# Patient Record
Sex: FEMALE | Race: WHITE | NOT HISPANIC OR LATINO | Employment: FULL TIME | URBAN - METROPOLITAN AREA
[De-identification: names, ages, dates, MRNs, and addresses within clinical notes are randomized per-mention and may not be internally consistent; named-entity substitution may affect disease eponyms.]

---

## 2017-11-29 ENCOUNTER — OFFICE VISIT (OUTPATIENT)
Dept: OPTOMETRY | Facility: CLINIC | Age: 62
End: 2017-11-29
Payer: COMMERCIAL

## 2017-11-29 DIAGNOSIS — H43.811 POSTERIOR VITREOUS DETACHMENT OF RIGHT EYE: ICD-10-CM

## 2017-11-29 DIAGNOSIS — H52.4 ASTIGMATISM WITH PRESBYOPIA, BILATERAL: ICD-10-CM

## 2017-11-29 DIAGNOSIS — H52.203 ASTIGMATISM WITH PRESBYOPIA, BILATERAL: ICD-10-CM

## 2017-11-29 DIAGNOSIS — H25.13 NUCLEAR SCLEROSIS, BILATERAL: Primary | ICD-10-CM

## 2017-11-29 PROCEDURE — 99999 PR PBB SHADOW E&M-EST. PATIENT-LVL II: CPT | Mod: PBBFAC,,, | Performed by: OPTOMETRIST

## 2017-11-29 PROCEDURE — 92015 DETERMINE REFRACTIVE STATE: CPT | Mod: S$GLB,,, | Performed by: OPTOMETRIST

## 2017-11-29 PROCEDURE — 92014 COMPRE OPH EXAM EST PT 1/>: CPT | Mod: S$GLB,,, | Performed by: OPTOMETRIST

## 2017-11-29 NOTE — PROGRESS NOTES
HPI     Last eye exam was 11/1/16 with Dr. Sorenson.  Patient states for the past 2 weeks has been seeing flashes temporal OD   with new floaters. Overall vision seems the same.  Patient denies diplopia, headaches, and pain.      Last edited by Lorna Aranda on 11/29/2017  3:13 PM. (History)            Assessment /Plan     For exam results, see Encounter Report.    Nuclear sclerosis, bilateral    Posterior vitreous detachment of right eye    Astigmatism with presbyopia, bilateral            1.  Educated on cataracts and affects on vision.  Early-monitor.  2.  Educated pt on PVD and on signs/symptoms RD: increase floaters, flashes, black curtain.  Retina normal OU--no holes, tears, breaks, or RDs.  3.  Bifocal rx given          RTC 1 month for PVD check.

## 2018-01-12 ENCOUNTER — OFFICE VISIT (OUTPATIENT)
Dept: OPTOMETRY | Facility: CLINIC | Age: 63
End: 2018-01-12
Payer: COMMERCIAL

## 2018-01-12 DIAGNOSIS — H43.811 POSTERIOR VITREOUS DETACHMENT OF RIGHT EYE: Primary | ICD-10-CM

## 2018-01-12 PROCEDURE — 99999 PR PBB SHADOW E&M-EST. PATIENT-LVL II: CPT | Mod: PBBFAC,,, | Performed by: OPTOMETRIST

## 2018-01-12 PROCEDURE — 92014 COMPRE OPH EXAM EST PT 1/>: CPT | Mod: S$GLB,,, | Performed by: OPTOMETRIST

## 2018-01-12 NOTE — PROGRESS NOTES
HPI     Concerns About Ocular Health    Additional comments: PVD check OD           Comments   Patient's last dilated exam was: 11/29/2017    Pt here for 1 month PVD check OD. Pt states still seeing flashes and   floater OD, several times a day, no changes since last visit. No changes   in vision. Not using any gtts        Last edited by Jenni Maharaj, PCT on 1/12/2018  2:53 PM.   (History)            Assessment /Plan     For exam results, see Encounter Report.    Posterior vitreous detachment of right eye            1.  Still sees flashes 3-4x/day--no improvement over the last 6 weeks.  Floaters stable.  No holes, tears, breaks, or RDs found.  Will schedule appointment with retina for further examination since flashes are still persistent.

## 2018-02-06 ENCOUNTER — INITIAL CONSULT (OUTPATIENT)
Dept: OPHTHALMOLOGY | Facility: CLINIC | Age: 63
End: 2018-02-06
Payer: COMMERCIAL

## 2018-02-06 DIAGNOSIS — H43.811 POSTERIOR VITREOUS DETACHMENT, RIGHT: Primary | ICD-10-CM

## 2018-02-06 DIAGNOSIS — H25.13 NS (NUCLEAR SCLEROSIS), BILATERAL: ICD-10-CM

## 2018-02-06 PROCEDURE — 99999 PR PBB SHADOW E&M-EST. PATIENT-LVL III: CPT | Mod: PBBFAC,,, | Performed by: OPHTHALMOLOGY

## 2018-02-06 PROCEDURE — 92014 COMPRE OPH EXAM EST PT 1/>: CPT | Mod: S$GLB,,, | Performed by: OPHTHALMOLOGY

## 2018-02-06 PROCEDURE — 92225 PR SPECIAL EYE EXAM, INITIAL: CPT | Mod: RT,S$GLB,, | Performed by: OPHTHALMOLOGY

## 2018-02-06 NOTE — PROGRESS NOTES
HPI     Eye Problem    Additional comments: consult per Yas PVD            Comments   Since mid November she has been seeing flashes and floaters OD. She had seen Dr Sorenson who recommended retina consult if this continued. She hasn't had any changes in her vision       Last edited by Caren Cervantes on 2/6/2018  2:29 PM. (History)      A/P    1. PVD OD  Persistent photopsias  No retinal breaks or traction    RD precautions    2. Early NS OU    REtina PRN

## 2018-02-06 NOTE — LETTER
February 6, 2018      Shona Sorenson, OD  [address]  [facility] [address]           [facility]  [address]  [facility] [address]  Phone: [phone]  Fax: [phone]          Patient: Kristy Wallisch   MR Number: [id number]   YOB: 1955   Date of Visit: 2/6/2018       Dear Dr. Shona Sorenson:    Thank you for referring Kristy Wallisch to me for evaluation. Attached you will find relevant portions of my assessment and plan of care.    If you have questions, please do not hesitate to call me. I look forward to following Kristy Wallisch along with you.    Sincerely,    WILLIAM Polk MD    Enclosure  CC:  No Recipients    If you would like to receive this communication electronically, please contact [email or web address] or [phone] to request more information on [employer] access.    For providers and/or their staff who would like to refer a patient to [facility], please contact us through our one-stop-shop provider referral line, [facility], at [phone].    If you feel you have received this communication in error or would no longer like to receive these types of communications, please e-mail [email or web address]

## 2018-11-06 ENCOUNTER — OFFICE VISIT (OUTPATIENT)
Dept: FAMILY MEDICINE | Facility: CLINIC | Age: 63
End: 2018-11-06
Payer: COMMERCIAL

## 2018-11-06 VITALS
BODY MASS INDEX: 28.76 KG/M2 | WEIGHT: 168.44 LBS | DIASTOLIC BLOOD PRESSURE: 80 MMHG | TEMPERATURE: 98 F | SYSTOLIC BLOOD PRESSURE: 146 MMHG | HEIGHT: 64 IN | RESPIRATION RATE: 16 BRPM

## 2018-11-06 DIAGNOSIS — M54.32 LEFT SIDED SCIATICA: Primary | ICD-10-CM

## 2018-11-06 DIAGNOSIS — R03.0 ELEVATED BLOOD PRESSURE READING: ICD-10-CM

## 2018-11-06 PROCEDURE — 99203 OFFICE O/P NEW LOW 30 MIN: CPT | Mod: S$GLB,,, | Performed by: INTERNAL MEDICINE

## 2018-11-06 PROCEDURE — 3008F BODY MASS INDEX DOCD: CPT | Mod: CPTII,S$GLB,, | Performed by: INTERNAL MEDICINE

## 2018-11-06 PROCEDURE — 99999 PR PBB SHADOW E&M-EST. PATIENT-LVL IV: CPT | Mod: PBBFAC,,, | Performed by: INTERNAL MEDICINE

## 2018-11-06 NOTE — PROGRESS NOTES
"Subjective:        Patient ID: Kristy Wallisch is a 62 y.o. female.    Chief Complaint: Leg Pain (left)    HPI   Kristy Wallisch presents with c/o left leg pain that started 1 week ago.  Pain starts in the left buttock and radiates down the leg to the calf, "skips the knee".  The pain feels like "burning" or "cramping"; it's not constant.  It is worse with sitting for long periods, better with standing, unaffected by lying down flat.  Initially the pain was relieved with a heating paid and aspirin but now it's more constant.  Pt reports being on a flight 3 days before the pain started but otherwise no change in her usual activity.  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  No fall, trauma or strenuous activity.    Denies leg weakness, numbness, tingling.    Review of Systems  as per HPI      Objective:        Vitals:    11/06/18 1608   BP: (!) 146/80   Resp:    Temp:      Physical Exam   Constitutional: She is oriented to person, place, and time. She appears well-developed and well-nourished. No distress.   Musculoskeletal: Normal range of motion. She exhibits no tenderness or deformity.   - 5/5 strength in b/l LEs  - positive SLR b/l   Neurological: She is alert and oriented to person, place, and time.   Skin: Skin is warm and dry.   Vitals reviewed.          Assessment:         1. Left sided sciatica    2. Elevated blood pressure reading              Plan:         Shanae was seen today for leg pain.    Diagnoses and all orders for this visit:    Left sided sciatica: Discussed conservative treatment including NSAIDs, ice or heat, gentle stretching, avoid exacerbating activities including prolonged sitting.  Follow up if worse or not improving; consider PT, Gabapentin or steroids at that time.    Elevated blood pressure: Improved but still elevated when rechecked by me.  Pt denies personal hx of HTN but she admits to " infrequent doctors' visits.  Recommend pt establish care with PCP and follow up for annual exam and labs.          Follow-up in about 1 month (around 12/6/2018), or if symptoms worsen or fail to improve.    Patient Instructions   Avoid sitting for long periods of time, heavy lifting, strenuous activity until pain improves.    Take anti inflammatories for the first week or until pain improves then decrease to as needed:  - Ibuprofen 800mg twice daily  - Aleve 500mg twice daily  Take these with food to avoid stomach irritation.    Ice or heat as needed to areas of pain.    Gentle stretching of the low back and legs.      Sciatica    Sciatica is a condition that causes pain in the lower back that spreads down into the buttock, hip, and leg. Sometimes the leg pain can happen without any back pain. Sciatica happens when a spinal nerve is irritated or has pressure put on it as comes out of the spinal canal in the lower back. This most often happens when a bulge or rupture of a nearby spinal disk presses on the nerve. Sciatica can also be caused by a narrowing of the spinal canal (spinal stenosis) or spasm of the muscle in the buttocks that the sciatic nerve passes through (pyriform muscle). Sciatica is also called lumbar radiculopathy.  Sciatica may begin after a sudden twisting or bending force, such as in a car accident. Or it can happen after a simple awkward movement. In either case, muscle spasm often also happens. Muscle spasm makes the pain worse.  A healthcare provider makes a diagnosis of sciatica from your symptoms and a physical exam. Unless you had an injury from a car accident or fall, you usually wont have X-rays taken at this time. This is because the nerves and disks in your back cant be seen on an X-ray. If the provider sees signs of a compressed nerve, you will need to schedule an MRI scan as an outpatient. Signs of a compressed nerve include loss of strength in a leg.  Most sciatica gets better with  medicine, exercise, and physical therapy. If your symptoms continue after at least 3 months of medical treatment, you may need surgery or injections to your lower back.  Home care  Follow these tips when caring for yourself at home:  · You may need to stay in bed the first few days. But as soon as possible, begin sitting up or walking. This will help you avoid problems that come from staying in bed for long periods.  · When in bed, try to find a position that is comfortable. A firm mattress is best. Try lying flat on your back with pillows under your knees. You can also try lying on your side with your knees bent up toward your chest and a pillow between your knees.  · Avoid sitting for long periods. This puts more stress on your lower back than standing or walking.  · Use heat from a hot shower, hot bath, or heating pad to help ease pain. Massage can also help. You can also try using an ice pack. You can make your own ice pack by putting ice cubes in a plastic bag. Wrap the bag in a thin towel. Try both heat and cold to see which works best. Use the method that feels best for 20 minutes several times a day.  · You may use acetaminophen or ibuprofen to ease pain, unless another pain medicine was prescribed. Note: If you have chronic liver or kidney disease, talk with your healthcare provider before taking these medicines. Also talk with your provider if youve had a stomach ulcer or gastrointestinal bleeding.  · Use safe lifting methods. Dont lift anything heavier than 15 pounds until all of the pain is gone.  Follow-up care  Follow up with your healthcare provider, or as advised. You may need physical therapy or additional tests.  If X-rays were taken, a radiologist will look at them. You will be told of any new findings that may affect your care.  When to seek medical advice  Call your healthcare provider right away if any of these occur:  · Pain gets worse even after taking prescribed medicine  · Weakness or  numbness in 1 or both legs or hips  · Numbness in your groin or genital area  · You cant control your bowel or bladder  · Fever  · Redness or swelling over your back or spine   Date Last Reviewed: 8/1/2016 © 2000-2017 [employer]. [address] [place] [id number]. All rights reserved. This information is not intended as a substitute for professional medical care. Always follow your healthcare professional's instructions.

## 2018-11-06 NOTE — PATIENT INSTRUCTIONS
Avoid sitting for long periods of time, heavy lifting, strenuous activity until pain improves.    Take anti inflammatories for the first week or until pain improves then decrease to as needed:  - Ibuprofen 800mg twice daily  - Aleve 500mg twice daily  Take these with food to avoid stomach irritation.    Ice or heat as needed to areas of pain.    Gentle stretching of the low back and legs.      Sciatica    Sciatica is a condition that causes pain in the lower back that spreads down into the buttock, hip, and leg. Sometimes the leg pain can happen without any back pain. Sciatica happens when a spinal nerve is irritated or has pressure put on it as comes out of the spinal canal in the lower back. This most often happens when a bulge or rupture of a nearby spinal disk presses on the nerve. Sciatica can also be caused by a narrowing of the spinal canal (spinal stenosis) or spasm of the muscle in the buttocks that the sciatic nerve passes through (pyriform muscle). Sciatica is also called lumbar radiculopathy.  Sciatica may begin after a sudden twisting or bending force, such as in a car accident. Or it can happen after a simple awkward movement. In either case, muscle spasm often also happens. Muscle spasm makes the pain worse.  A healthcare provider makes a diagnosis of sciatica from your symptoms and a physical exam. Unless you had an injury from a car accident or fall, you usually wont have X-rays taken at this time. This is because the nerves and disks in your back cant be seen on an X-ray. If the provider sees signs of a compressed nerve, you will need to schedule an MRI scan as an outpatient. Signs of a compressed nerve include loss of strength in a leg.  Most sciatica gets better with medicine, exercise, and physical therapy. If your symptoms continue after at least 3 months of medical treatment, you may need surgery or injections to your lower back.  Home care  Follow these tips when caring for yourself at  home:  · You may need to stay in bed the first few days. But as soon as possible, begin sitting up or walking. This will help you avoid problems that come from staying in bed for long periods.  · When in bed, try to find a position that is comfortable. A firm mattress is best. Try lying flat on your back with pillows under your knees. You can also try lying on your side with your knees bent up toward your chest and a pillow between your knees.  · Avoid sitting for long periods. This puts more stress on your lower back than standing or walking.  · Use heat from a hot shower, hot bath, or heating pad to help ease pain. Massage can also help. You can also try using an ice pack. You can make your own ice pack by putting ice cubes in a plastic bag. Wrap the bag in a thin towel. Try both heat and cold to see which works best. Use the method that feels best for 20 minutes several times a day.  · You may use acetaminophen or ibuprofen to ease pain, unless another pain medicine was prescribed. Note: If you have chronic liver or kidney disease, talk with your healthcare provider before taking these medicines. Also talk with your provider if youve had a stomach ulcer or gastrointestinal bleeding.  · Use safe lifting methods. Dont lift anything heavier than 15 pounds until all of the pain is gone.  Follow-up care  Follow up with your healthcare provider, or as advised. You may need physical therapy or additional tests.  If X-rays were taken, a radiologist will look at them. You will be told of any new findings that may affect your care.  When to seek medical advice  Call your healthcare provider right away if any of these occur:  · Pain gets worse even after taking prescribed medicine  · Weakness or numbness in 1 or both legs or hips  · Numbness in your groin or genital area  · You cant control your bowel or bladder  · Fever  · Redness or swelling over your back or spine   Date Last Reviewed: 8/1/2016  © 1255-2783 [employer]. [address] [place] [address], [place] [address]. All rights reserved. This information is not intended as a substitute for professional medical care. Always follow your healthcare professional's instructions.

## 2019-02-14 PROCEDURE — 12001 RPR S/N/AX/GEN/TRNK 2.5CM/<: CPT

## 2019-02-14 PROCEDURE — 99283 EMERGENCY DEPT VISIT LOW MDM: CPT | Mod: 25

## 2019-02-15 ENCOUNTER — HOSPITAL ENCOUNTER (EMERGENCY)
Facility: OTHER | Age: 64
Discharge: HOME | End: 2019-02-15
Attending: EMERGENCY MEDICINE
Payer: COMMERCIAL

## 2019-02-15 VITALS
HEART RATE: 85 BPM | OXYGEN SATURATION: 99 % | HEIGHT: 64 IN | RESPIRATION RATE: 18 BRPM | BODY MASS INDEX: 27.31 KG/M2 | DIASTOLIC BLOOD PRESSURE: 85 MMHG | SYSTOLIC BLOOD PRESSURE: 135 MMHG | TEMPERATURE: 98 F | WEIGHT: 160 LBS
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
## 2019-02-15 DIAGNOSIS — S81.811A LACERATION OF RIGHT LOWER LEG, INITIAL ENCOUNTER: Primary | ICD-10-CM

## 2019-02-15 PROCEDURE — 25000003 PHARM REV CODE 250: Performed by: PHYSICIAN ASSISTANT

## 2019-02-15 PROCEDURE — 12001 RPR S/N/AX/GEN/TRNK 2.5CM/<: CPT

## 2019-02-15 RX ORDER — AMOXICILLIN AND CLAVULANATE POTASSIUM 875; 125 MG/1; MG/1
1 TABLET, FILM COATED ORAL
Status: COMPLETED | OUTPATIENT
Start: 2019-02-15 | End: 2019-02-15

## 2019-02-15 RX ORDER — LIDOCAINE HYDROCHLORIDE 10 MG/ML
10 INJECTION, SOLUTION EPIDURAL; INFILTRATION; INTRACAUDAL; PERINEURAL
Status: COMPLETED | OUTPATIENT
Start: 2019-02-15 | End: 2019-02-15

## 2019-02-15 RX ORDER — AMOXICILLIN AND CLAVULANATE POTASSIUM 875; 125 MG/1; MG/1
1 TABLET, FILM COATED ORAL 2 TIMES DAILY
Qty: 14 TABLET | Refills: 0 | Status: SHIPPED | OUTPATIENT
Start: 2019-02-15 | End: 2019-03-28

## 2019-02-15 RX ORDER — ACETAMINOPHEN 500 MG
1000 TABLET ORAL
Status: COMPLETED | OUTPATIENT
Start: 2019-02-15 | End: 2019-02-15

## 2019-02-15 RX ADMIN — ACETAMINOPHEN 1000 MG: 500 TABLET ORAL at 01:02

## 2019-02-15 RX ADMIN — AMOXICILLIN AND CLAVULANATE POTASSIUM 1 TABLET: 875; 125 TABLET, FILM COATED ORAL at 01:02

## 2019-02-15 RX ADMIN — LIDOCAINE HYDROCHLORIDE 100 MG: 10 INJECTION, SOLUTION EPIDURAL; INFILTRATION; INTRACAUDAL at 01:02

## 2019-02-15 NOTE — ED PROVIDER NOTES
Encounter Date: 2/14/2019    SCRIBE #1 NOTE: I, Vivi Cleveland, am scribing for, and in the presence of,  Dr. Menon. I have scribed the following portions of the note - Other sections scribed: Procedure note, X-ray interpretation, Attending ED notes.       History     Chief Complaint   Patient presents with    Animal Bite     to right lower extremity, bleeding controlled      Patient is a 63-year-old female who presents to the ED with a dog bite.  Patient states she was bit by her behind her right knee today.  She states the dog did not latch on for long.  She states she did not clean this area.  She reports minimal pain. She reports normal range of motion of her right knee.  She states her last tetanus was within 2-3 years ago.  She has no numbness or weakness to this area.  Patient states her dog is up-to-date on all immunizations.      The history is provided by the patient.     Review of patient's allergies indicates:  No Known Allergies  Past Medical History:   Diagnosis Date    Benign essential tremor     Cataract     Skin disease     Prosiasis     Past Surgical History:   Procedure Laterality Date    tonsillectomy       Family History   Problem Relation Age of Onset    Essential Tremor Mother     Essential Tremor Brother     Melanoma Neg Hx     Psoriasis Neg Hx     Lupus Neg Hx     Eczema Neg Hx      Social History     Tobacco Use    Smoking status: Current Every Day Smoker     Packs/day: 0.50     Years: 30.00     Pack years: 15.00     Types: Cigarettes    Smokeless tobacco: Never Used   Substance Use Topics    Alcohol use: Yes     Alcohol/week: 3.0 oz     Types: 6 Standard drinks or equivalent per week    Drug use: No     Review of Systems   Constitutional: Negative for chills and fever.   HENT: Negative for congestion and sore throat.    Eyes: Negative for pain.   Respiratory: Negative for shortness of breath.    Cardiovascular: Negative for chest pain.   Gastrointestinal: Negative for  abdominal pain, diarrhea, nausea and vomiting.   Genitourinary: Negative for dysuria.   Musculoskeletal: Negative for arthralgias.   Skin: Positive for wound (Dog bite to posterior, right knee).   Neurological: Negative for headaches.       Physical Exam     Initial Vitals [02/15/19 0002]   BP Pulse Resp Temp SpO2   (!) 141/95 92 18 98.3 °F (36.8 °C) 96 %      MAP       --         Physical Exam    Constitutional: Vital signs are normal. She is cooperative.   HENT:   Head: Normocephalic and atraumatic.   Eyes: EOM are normal. Pupils are equal, round, and reactive to light.   Neck: Normal range of motion. Neck supple.   Cardiovascular: Normal rate, regular rhythm and intact distal pulses.   Pulmonary/Chest: Breath sounds normal. She has no wheezes. She has no rhonchi. She has no rales.   Musculoskeletal:   Right knee has normal range of motion. No bony tenderness or deformity.   Neurological: She is alert and oriented to person, place, and time. GCS eye subscore is 4. GCS verbal subscore is 5. GCS motor subscore is 6.   Skin: Skin is warm and dry.   0.5 cm, shallow puncture wound to the right, superior calf. There is a 1.5-2 cm triangular-shaped laceration/wound noted to the right, medial calf.  There is exposure to subcutaneous fat but no muscle or bone.  Wound is hemostatic.         ED Course   Lac Repair  Date/Time: 2/15/2019 2:37 AM  Performed by: Jammie Menon MD  Authorized by: Jammie Menon MD   Consent Done: Not Needed  Body area: lower extremity  Location details: right knee  Foreign bodies: no foreign bodies  Tendon involvement: none  Nerve involvement: none  Vascular damage: no  Anesthesia: local infiltration    Anesthesia:  Local Anesthetic: lidocaine 1% without epinephrine  Patient sedated: no  Preparation: Patient was prepped and draped in the usual sterile fashion.  Irrigation solution: saline  Irrigation method: syringe  Amount of cleaning: standard  Debridement: none  Degree of undermining:  none  Skin closure: 4-0 Prolene  Number of sutures: 6  Technique: simple  Approximation: loose  Dressing: dressing applied  Comments:   Medial Knee: V shaped 2.5 cm laceration. Five 4-0 prolene sutures.  Posterior Knee: 1 cm linear laceration. One 4-0 prolene suture.        Labs Reviewed - No data to display       Imaging Results          X-Ray Knee 3 View Right (Final result)  Result time 02/15/19 01:50:06    Final result by Thomas Loredo MD (02/15/19 01:50:06)                 Impression:      No acute osseous abnormality identified.      Electronically signed by: Thomas Loredo MD  Date:    02/15/2019  Time:    01:50             Narrative:    EXAMINATION:  XR KNEE 3 VIEW RIGHT    CLINICAL HISTORY:  Bitten by dog, initial encounter    TECHNIQUE:  AP, lateral, and Merchant views of the right knee were performed.    COMPARISON:  None    FINDINGS:  No evidence of fracture, dislocation, or osseous destructive process.  Joint spaces are maintained.  No suprapatellar joint effusion.  Small amount of subcutaneous soft tissue air is seen at the medial aspect of the knee consistent with recent injury.  No radiopaque retained foreign body seen.                              X-Rays:   Independently Interpreted Readings:   Other Readings:  Right Knee: Negative.    Medical Decision Making:   Initial Assessment:   Urgent evaluation of a 63 y.o. female presenting to the emergency department complaining of dog bite. Patient is afebrile, nontoxic appearing and hemodynamically stable.  Patient with dog bite to right, posterior knee/ calf.  Patient has 2 wounds, 1 of which will require primary closure.  Wounds will be cleaned.  Patient's tetanus is up-to-date.  She will be given Augmentin analgesics here in the ED.  Will also obtain an x-ray.  Limb is distally neurovascular intact.  Care will be signed over to Dr. Menon at 1:45 a.m.            Scribe Attestation:   Scribe #1: I performed the above scribed service and the  documentation accurately describes the services I performed. I attest to the accuracy of the note.    Attending Attestation:           Physician Attestation for Scribe:  Physician Attestation Statement for Scribe #1: I, Dr. Menon, reviewed documentation, as scribed by Vivi Cleveland in my presence, and it is both accurate and complete.         Attending ED Notes:   Emergent evaluation of 63 y.o. White female presenting 1 hour s/p being bitten by her dog on the right knee. On exam, she is neurovascularly intact. No active bleeding with 2 lacerations noted. Patient was irrigated and wounds sutured, loosely approximated. She is discharged to self care with instruction to remove sutures in 7 to 10 days. Will prescribe Augmentin and Bactroban.             Clinical Impression:     1. Laceration of right lower leg, initial encounter    2. Dog bite                                 Jason Argueta PA-C  02/17/19 [id number]

## 2019-02-20 PROBLEM — S81.811D: Status: ACTIVE | Noted: 2019-02-20

## 2019-02-20 NOTE — PROGRESS NOTES
Subjective:       Patient ID: Kristy Wallisch is a 63 y.o. female who  has a past medical history of Benign essential tremor, Cataract, Posterior vitreous detachment of right eye, and Psoriasis.    Chief Complaint: Suture / Staple Removal    HPI    History was obtained from the patient and supplemented through chart review.  The patient was seen in the ED last week for RLE laceration.  She saw Dr. Barbosa once in  for left-sided sciatica.    Works in  for [employer].  Has a dog.    Presents today for suture removal.    RLE laceration s/p animal bite:    Was bit by a rescue dog last week behind her R knee.  UTD Tdap 2-3 years ago.  FROM of R knee without deformity.  Was neurovascularly intact.  On exam, there was 0.5 cm shallow puncture wound to the R superior calf, 1.5-2 cm triangular shaped lac to the R medial calf.  X-ray without foreign body or fracture.  Was irrigated; both wounds were sutured at bedside.  Was given Augmentin and Bactroban, which she is taking.  Has been applying Bactroban ointment but it was a nasal version.    Denies pain, fever, erythema, increased warmth, fluctuance, discharge.  Had mild loose stool after taking the Augmentin.  Does eat yogurt in the morning.    Elevated BP:  No history of hypertension, but also has not seen a doctor frequently.  BP 130s to 140s in the past.  BP 150s to 160s today.  Pt denies cp/sob.  +FHx of hypertension.    Enjoys being outdoors and working in her garden.  Exercises through walking daily around the neighborhood.  Takes the bus home.      Cooks at home.  Noticed that she lost 5 lb during the government shutdown because she had time to cook.  Does admit to eating frozen dinners.  Breakfast: oatmeal, cereal, banana  Lunch: apple, leftovers, frozen dinners  Dinner: cereal because her  was out of town    Essential tremor: not on BB.  +FHx.  Does not impair handwriting.  Not bothersome.    Psoriasis: flares with  stress.    Tobacco use:  She smokes 0.5 ppd.  Started smoking 19.  22.5 pack year history.  Has never tried to quit before.  NUTD on pneumococcal vaccine.    Review of Systems   Constitutional: Negative for activity change and unexpected weight change.   HENT: Negative for hearing loss, rhinorrhea and trouble swallowing.    Eyes: Negative for discharge and visual disturbance.   Respiratory: Negative for chest tightness and wheezing.    Cardiovascular: Negative for chest pain and palpitations.   Gastrointestinal: Negative for blood in stool, constipation, diarrhea and vomiting.   Endocrine: Negative for polydipsia and polyuria.   Genitourinary: Negative for difficulty urinating, dysuria, hematuria and menstrual problem.   Musculoskeletal: Negative for arthralgias, joint swelling and neck pain.   Skin: Positive for wound. Negative for rash.   Neurological: Negative for weakness and headaches.   Hematological: Negative for adenopathy.   Psychiatric/Behavioral: Negative for confusion and dysphoric mood.       Past Medical History:   Diagnosis Date    Benign essential tremor     Cataract     Posterior vitreous detachment of right eye     Psoriasis      Past Surgical History:   Procedure Laterality Date    tonsillectomy       Family History   Problem Relation Age of Onset    Essential Tremor Mother     Hypertension Mother     Essential Tremor Brother     Hypertension Father     Heart disease Father     Hyperlipidemia Father     Breast cancer Paternal Grandmother     Breast cancer Paternal Aunt     Melanoma Neg Hx     Psoriasis Neg Hx     Lupus Neg Hx     Eczema Neg Hx     Colon cancer Neg Hx      Social History     Socioeconomic History    Marital status:      Spouse name: Not on file    Number of children: Not on file    Years of education: Not on file    Highest education level: Not on file   Social Needs    Financial resource strain: Not on file    Food insecurity - worry: Not on file     "Food insecurity - inability: Not on file    Transportation needs - medical: Not on file    Transportation needs - non-medical: Not on file   Occupational History    Occupation: park [employer]     Employer: [employer]   Tobacco Use    Smoking status: Current Every Day Smoker     Packs/day: 0.50     Years: 30.00     Pack years: 15.00     Types: Cigarettes    Smokeless tobacco: Never Used   Substance and Sexual Activity    Alcohol use: Yes     Alcohol/week: 3.0 oz     Types: 6 Standard drinks or equivalent per week    Drug use: No    Sexual activity: Not on file   Other Topics Concern    Are you pregnant or think you may be? No    Breast-feeding No   Social History Narrative    Not on file     Objective:      Vitals:    02/21/19 1523   BP: (!) 150/80   Pulse: 87   SpO2: 96%   Weight: 75.1 kg (165 lb 9.1 oz)   Height: 5' 4" (1.626 m)      Physical Exam   Constitutional: She appears well-developed and well-nourished. No distress.   HENT:   Head: Normocephalic and atraumatic.   Nose: Nose normal.   Mouth/Throat: Oropharynx is clear and moist. No oropharyngeal exudate.   Eyes: Conjunctivae and EOM are normal. Pupils are equal, round, and reactive to light. Right eye exhibits no discharge. Left eye exhibits no discharge. No scleral icterus.   Neck: Neck supple. No tracheal deviation present. No thyromegaly present.   Cardiovascular: Normal rate, regular rhythm, normal heart sounds and intact distal pulses.   No murmur heard.  Pulses:       Dorsalis pedis pulses are 2+ on the right side.        Posterior tibial pulses are 2+ on the right side.   Pulmonary/Chest: Effort normal and breath sounds normal. No respiratory distress. She has no wheezes.   Abdominal: Soft. Bowel sounds are normal. There is no tenderness.   Musculoskeletal: She exhibits no edema, tenderness or deformity.   Lymphadenopathy:     She has no cervical adenopathy.   Neurological: She is alert. No cranial nerve deficit. Gait normal.   Skin: " Skin is warm and dry. Capillary refill takes less than 2 seconds. She is not diaphoretic. No erythema.   Minimal erythema around wounds.  No increased warmth, fluctuance, discharge, NTTP.  0.5 cm puncture wound to the right posterior calf distal to the popliteal fossa with 1 suture.  1.5-2 cm triangular shaped laceration to the right medial calf with 5 sutures.  Both well approximated and scabbed over.   Psychiatric: She has a normal mood and affect. Her behavior is normal.         Lab Results   Component Value Date    WBC 6.62 09/09/2010    HGB 14.5 09/09/2010    HCT 44.3 09/09/2010     09/09/2010    CHOL 207 (H) 09/09/2010    TRIG 53 09/09/2010    HDL 62 09/09/2010    ALT 31 09/09/2010    AST 25 09/09/2010     09/09/2010    K 4.9 09/09/2010     09/09/2010    CREATININE 0.6 09/09/2010    BUN 17 09/09/2010    CO2 25 09/09/2010    TSH 1.98 09/09/2010       The ASCVD Risk score (Saul BUCK Jr., et al., 2013) failed to calculate for the following reasons:    Cannot find a previous HDL lab    Cannot find a previous total cholesterol lab    (Imaging have been independently reviewed)  Leg x-ray without foreign body or fracture    Assessment:       1. Leg laceration, right, subsequent encounter    2. Essential hypertension    3. Benign essential tremor    4. Psoriasis    5. Light cigarette smoker (1-9 cigs/day)    6. Healthcare maintenance    7. Encounter for lipid screening for cardiovascular disease    8. Encounter for screening for diabetes mellitus    9. Need for hepatitis C screening test    10. Family history of breast cancer          Plan:       Shanae was seen today for suture / staple removal.    Diagnoses and all orders for this visit:    Leg laceration, right, subsequent encounter  Comments:  UTD Tdap 2-3 years ago. +2 PT, DP pulses. No systemic, infectious sx. Sutures removed in clinic. Will continue Augmentin; can eat yogurt due to loose stool  Orders:  -     mupirocin (BACTROBAN) 2 % ointment;  Apply topically 3 (three) times daily. for 5 days    Essential hypertension  Comments:  BP elevated, but no dx of HTN. -160s. +FHx. Discussed diet, avoiding frozen dinners, tobacco cessation. Start Norvasc. Nurse visit BP check 1-2 wks.  Orders:  -     amLODIPine (NORVASC) 5 MG tablet; Take 1 tablet (5 mg total) by mouth once daily.    Benign essential tremor  Comments:  Has not been on BB, but symptoms are not bothersome    Psoriasis  Comments:  Flares occur during periods of stress.  No active issues    Light cigarette smoker (1-9 cigs/day)  Comments:  Counseled for 5 min on tobacco cessation.  22.5 pack-year history.  Not yet indicated for spiral CT.  Will offer pneumococcal vaccine at next visit.    Healthcare maintenance  -     CBC auto differential; Future  -     Comprehensive metabolic panel; Future    Encounter for lipid screening for cardiovascular disease  -     Lipid panel; Future    Encounter for screening for diabetes mellitus  -     Hemoglobin A1c; Future    Need for hepatitis C screening test  -     Hepatitis C antibody; Future    Family history of breast cancer  Comments:  Has 2 family members with breast cancer.  Patient has not seen a doctor in a long time. Will offer screening MMG at next visit.           Notification of Lab Results: Phone Call    Side effects of medication(s) were discussed in detail and patient voiced understanding.  Patient will call back for any issues or complications.     RTC in 1 month(s) or sooner PRN for HTN.  Nurse visit for BP check in 1-2 weeks.

## 2019-02-21 ENCOUNTER — OFFICE VISIT (OUTPATIENT)
Dept: INTERNAL MEDICINE | Facility: CLINIC | Age: 64
End: 2019-02-21
Payer: COMMERCIAL

## 2019-02-21 VITALS
BODY MASS INDEX: 28.27 KG/M2 | OXYGEN SATURATION: 96 % | WEIGHT: 165.56 LBS | HEART RATE: 87 BPM | SYSTOLIC BLOOD PRESSURE: 150 MMHG | DIASTOLIC BLOOD PRESSURE: 80 MMHG | HEIGHT: 64 IN

## 2019-02-21 DIAGNOSIS — Z80.3 FAMILY HISTORY OF BREAST CANCER: ICD-10-CM

## 2019-02-21 DIAGNOSIS — S81.811D LEG LACERATION, RIGHT, SUBSEQUENT ENCOUNTER: Primary | ICD-10-CM

## 2019-02-21 DIAGNOSIS — Z11.59 NEED FOR HEPATITIS C SCREENING TEST: ICD-10-CM

## 2019-02-21 DIAGNOSIS — Z13.220 ENCOUNTER FOR LIPID SCREENING FOR CARDIOVASCULAR DISEASE: ICD-10-CM

## 2019-02-21 DIAGNOSIS — Z13.6 ENCOUNTER FOR LIPID SCREENING FOR CARDIOVASCULAR DISEASE: ICD-10-CM

## 2019-02-21 DIAGNOSIS — Z13.1 ENCOUNTER FOR SCREENING FOR DIABETES MELLITUS: ICD-10-CM

## 2019-02-21 DIAGNOSIS — I10 ESSENTIAL HYPERTENSION: ICD-10-CM

## 2019-02-21 DIAGNOSIS — F17.210 LIGHT CIGARETTE SMOKER (1-9 CIGS/DAY): ICD-10-CM

## 2019-02-21 DIAGNOSIS — Z00.00 HEALTHCARE MAINTENANCE: ICD-10-CM

## 2019-02-21 DIAGNOSIS — L40.9 PSORIASIS: ICD-10-CM

## 2019-02-21 DIAGNOSIS — G25.0 BENIGN ESSENTIAL TREMOR: ICD-10-CM

## 2019-02-21 PROCEDURE — 99999 PR PBB SHADOW E&M-EST. PATIENT-LVL IV: ICD-10-PCS | Mod: PBBFAC,,, | Performed by: INTERNAL MEDICINE

## 2019-02-21 PROCEDURE — 99999 PR PBB SHADOW E&M-EST. PATIENT-LVL IV: CPT | Mod: PBBFAC,,, | Performed by: INTERNAL MEDICINE

## 2019-02-21 PROCEDURE — 99215 OFFICE O/P EST HI 40 MIN: CPT | Mod: S$GLB,,, | Performed by: INTERNAL MEDICINE

## 2019-02-21 PROCEDURE — 3079F PR MOST RECENT DIASTOLIC BLOOD PRESSURE 80-89 MM HG: ICD-10-PCS | Mod: CPTII,S$GLB,, | Performed by: INTERNAL MEDICINE

## 2019-02-21 PROCEDURE — 3077F SYST BP >= 140 MM HG: CPT | Mod: CPTII,S$GLB,, | Performed by: INTERNAL MEDICINE

## 2019-02-21 PROCEDURE — 99406 BEHAV CHNG SMOKING 3-10 MIN: CPT | Mod: S$GLB,,, | Performed by: INTERNAL MEDICINE

## 2019-02-21 PROCEDURE — 3008F BODY MASS INDEX DOCD: CPT | Mod: CPTII,S$GLB,, | Performed by: INTERNAL MEDICINE

## 2019-02-21 PROCEDURE — 3008F PR BODY MASS INDEX (BMI) DOCUMENTED: ICD-10-PCS | Mod: CPTII,S$GLB,, | Performed by: INTERNAL MEDICINE

## 2019-02-21 PROCEDURE — 99215 PR OFFICE/OUTPT VISIT, EST, LEVL V, 40-54 MIN: ICD-10-PCS | Mod: S$GLB,,, | Performed by: INTERNAL MEDICINE

## 2019-02-21 PROCEDURE — 99406 PR TOBACCO USE CESSATION INTERMEDIATE 3-10 MINUTES: ICD-10-PCS | Mod: S$GLB,,, | Performed by: INTERNAL MEDICINE

## 2019-02-21 PROCEDURE — 3079F DIAST BP 80-89 MM HG: CPT | Mod: CPTII,S$GLB,, | Performed by: INTERNAL MEDICINE

## 2019-02-21 PROCEDURE — 3077F PR MOST RECENT SYSTOLIC BLOOD PRESSURE >= 140 MM HG: ICD-10-PCS | Mod: CPTII,S$GLB,, | Performed by: INTERNAL MEDICINE

## 2019-02-21 RX ORDER — MUPIROCIN 20 MG/G
OINTMENT TOPICAL
Refills: 0 | COMMUNITY
Start: 2019-02-15 | End: 2019-02-21

## 2019-02-21 RX ORDER — ZOSTER VACCINE RECOMBINANT, ADJUVANTED 50 MCG/0.5
KIT INTRAMUSCULAR
Refills: 0 | COMMUNITY
Start: 2019-01-04 | End: 2019-06-27

## 2019-02-21 RX ORDER — MUPIROCIN 20 MG/G
OINTMENT TOPICAL 3 TIMES DAILY
Qty: 15 G | Refills: 0 | Status: SHIPPED | OUTPATIENT
Start: 2019-02-21 | End: 2019-02-26

## 2019-02-21 RX ORDER — AMLODIPINE BESYLATE 5 MG/1
5 TABLET ORAL DAILY
Qty: 30 TABLET | Refills: 1 | Status: SHIPPED | OUTPATIENT
Start: 2019-02-21 | End: 2019-03-28 | Stop reason: SDUPTHER

## 2019-02-22 DIAGNOSIS — Z12.39 BREAST CANCER SCREENING: ICD-10-CM

## 2019-03-07 ENCOUNTER — LAB VISIT (OUTPATIENT)
Dept: LAB | Facility: OTHER | Age: 64
End: 2019-03-07
Attending: INTERNAL MEDICINE
Payer: COMMERCIAL

## 2019-03-07 DIAGNOSIS — Z91.89 CANDIDATE FOR STATIN THERAPY DUE TO RISK OF FUTURE CARDIOVASCULAR EVENT: Primary | ICD-10-CM

## 2019-03-07 DIAGNOSIS — Z13.1 ENCOUNTER FOR SCREENING FOR DIABETES MELLITUS: ICD-10-CM

## 2019-03-07 DIAGNOSIS — Z00.00 HEALTHCARE MAINTENANCE: ICD-10-CM

## 2019-03-07 DIAGNOSIS — Z13.220 ENCOUNTER FOR LIPID SCREENING FOR CARDIOVASCULAR DISEASE: ICD-10-CM

## 2019-03-07 DIAGNOSIS — Z11.59 NEED FOR HEPATITIS C SCREENING TEST: ICD-10-CM

## 2019-03-07 DIAGNOSIS — Z13.6 ENCOUNTER FOR LIPID SCREENING FOR CARDIOVASCULAR DISEASE: ICD-10-CM

## 2019-03-07 LAB
ALBUMIN SERPL BCP-MCNC: 3.6 G/DL
ALP SERPL-CCNC: 79 U/L
ALT SERPL W/O P-5'-P-CCNC: 60 U/L
ANION GAP SERPL CALC-SCNC: 10 MMOL/L
AST SERPL-CCNC: 36 U/L
BASOPHILS # BLD AUTO: 0.05 K/UL
BASOPHILS NFR BLD: 0.9 %
BILIRUB SERPL-MCNC: 0.4 MG/DL
BUN SERPL-MCNC: 12 MG/DL
CALCIUM SERPL-MCNC: 8.9 MG/DL
CHLORIDE SERPL-SCNC: 105 MMOL/L
CHOLEST SERPL-MCNC: 190 MG/DL
CHOLEST/HDLC SERPL: 4 {RATIO}
CO2 SERPL-SCNC: 27 MMOL/L
CREAT SERPL-MCNC: 0.7 MG/DL
DIFFERENTIAL METHOD: ABNORMAL
EOSINOPHIL # BLD AUTO: 0.2 K/UL
EOSINOPHIL NFR BLD: 3.8 %
ERYTHROCYTE [DISTWIDTH] IN BLOOD BY AUTOMATED COUNT: 13.5 %
EST. GFR  (AFRICAN AMERICAN): >60 ML/MIN/1.73 M^2
EST. GFR  (NON AFRICAN AMERICAN): >60 ML/MIN/1.73 M^2
ESTIMATED AVG GLUCOSE: 114 MG/DL
GLUCOSE SERPL-MCNC: 94 MG/DL
HBA1C MFR BLD HPLC: 5.6 %
HCT VFR BLD AUTO: 44.8 %
HCV AB SERPL QL IA: NEGATIVE
HDLC SERPL-MCNC: 47 MG/DL
HDLC SERPL: 24.7 %
HGB BLD-MCNC: 14.9 G/DL
LDLC SERPL CALC-MCNC: 128.4 MG/DL
LYMPHOCYTES # BLD AUTO: 2.5 K/UL
LYMPHOCYTES NFR BLD: 45.3 %
MCH RBC QN AUTO: 31.6 PG
MCHC RBC AUTO-ENTMCNC: 33.3 G/DL
MCV RBC AUTO: 95 FL
MONOCYTES # BLD AUTO: 0.5 K/UL
MONOCYTES NFR BLD: 8.2 %
NEUTROPHILS # BLD AUTO: 2.3 K/UL
NEUTROPHILS NFR BLD: 41.8 %
NONHDLC SERPL-MCNC: 143 MG/DL
PLATELET # BLD AUTO: 186 K/UL
PMV BLD AUTO: 10.9 FL
POTASSIUM SERPL-SCNC: 3.9 MMOL/L
PROT SERPL-MCNC: 6.6 G/DL
RBC # BLD AUTO: 4.71 M/UL
SODIUM SERPL-SCNC: 142 MMOL/L
TRIGL SERPL-MCNC: 73 MG/DL
WBC # BLD AUTO: 5.48 K/UL

## 2019-03-07 PROCEDURE — 80053 COMPREHEN METABOLIC PANEL: CPT

## 2019-03-07 PROCEDURE — 85025 COMPLETE CBC W/AUTO DIFF WBC: CPT

## 2019-03-07 PROCEDURE — 86803 HEPATITIS C AB TEST: CPT

## 2019-03-07 PROCEDURE — 83036 HEMOGLOBIN GLYCOSYLATED A1C: CPT

## 2019-03-07 PROCEDURE — 36415 COLL VENOUS BLD VENIPUNCTURE: CPT

## 2019-03-07 PROCEDURE — 80061 LIPID PANEL: CPT

## 2019-03-07 RX ORDER — ATORVASTATIN CALCIUM 40 MG/1
40 TABLET, FILM COATED ORAL DAILY
Qty: 90 TABLET | Refills: 0 | Status: SHIPPED | OUTPATIENT
Start: 2019-03-07 | End: 2019-06-17 | Stop reason: SDUPTHER

## 2019-03-08 ENCOUNTER — TELEPHONE (OUTPATIENT)
Dept: INTERNAL MEDICINE | Facility: CLINIC | Age: 64
End: 2019-03-08

## 2019-03-08 NOTE — TELEPHONE ENCOUNTER
Called and spoke with patient telling her Your labs are normal, including checking for blood counts, electrolytes, liver and kidney function, diabetes, hepatitis C.       Your cholesterol level has improved, but considering your risk factors such as age and smoking, evidence based guidelines recommend starting a medication to help lower your cholesterol to decrease your risk for heart attacks and strokes.  I will send a prescription to your pharmacy for Lipitor to be taken once a day.  If you develop severe, frequent muscle aches, please stop the medication and notify our office to schedule an appointment. Patient said ok thank you

## 2019-03-12 ENCOUNTER — CLINICAL SUPPORT (OUTPATIENT)
Dept: INTERNAL MEDICINE | Facility: CLINIC | Age: 64
End: 2019-03-12
Payer: COMMERCIAL

## 2019-03-12 VITALS — DIASTOLIC BLOOD PRESSURE: 82 MMHG | HEART RATE: 95 BPM | OXYGEN SATURATION: 96 % | SYSTOLIC BLOOD PRESSURE: 130 MMHG

## 2019-03-12 PROCEDURE — 99999 PR PBB SHADOW E&M-EST. PATIENT-LVL II: CPT | Mod: PBBFAC,,,

## 2019-03-12 PROCEDURE — 99999 PR PBB SHADOW E&M-EST. PATIENT-LVL II: ICD-10-PCS | Mod: PBBFAC,,,

## 2019-03-12 NOTE — Clinical Note
Does patient have record of home blood pressure readings no. Last dose of blood pressure medication was taken at 7:00am.Patient is asymptomatic. BP: 130/82 , Pulse: 95.Dr. Nazario notified.

## 2019-03-12 NOTE — PROGRESS NOTES
Kristy Wallisch 63 y.o. female is here today for Blood Pressure check.   History of HTN no.    Review of patient's allergies indicates:  No Known Allergies  Creatinine   Date Value Ref Range Status   03/07/2019 0.7 0.5 - 1.4 mg/dL Final     Sodium   Date Value Ref Range Status   03/07/2019 142 136 - 145 mmol/L Final     Potassium   Date Value Ref Range Status   03/07/2019 3.9 3.5 - 5.1 mmol/L Final   ]  Patient verifies taking blood pressure medications on a regular bases at the same time of the day.     Current Outpatient Medications:     amLODIPine (NORVASC) 5 MG tablet, Take 1 tablet (5 mg total) by mouth once daily., Disp: 30 tablet, Rfl: 1    amoxicillin-clavulanate 875-125mg (AUGMENTIN) 875-125 mg per tablet, Take 1 tablet by mouth 2 (two) times daily., Disp: 14 tablet, Rfl: 0    ascorbic acid (VITAMIN C) 500 MG tablet, Take by mouth. 1 Tablet Oral Every day, Disp: , Rfl:     aspirin 81 MG Chew, Take by mouth. 1 Tablet, Chewable Oral Every day, Disp: , Rfl:     atorvastatin (LIPITOR) 40 MG tablet, Take 1 tablet (40 mg total) by mouth once daily., Disp: 90 tablet, Rfl: 0    GLUCOSAM HCL/MSM/CHONDRO MONTE A (GLUCOSAMINE HCL-MSM-CHONDROITN) 375-250-300 mg Tab, Take by mouth. 1 Tablet Oral Every day, Disp: , Rfl:     multivitamin (THERAGRAN) per tablet, Take by mouth. 1 Tablet Oral Every day, Disp: , Rfl:     SHINGRIX, PF, 50 mcg/0.5 mL injection, ADM 0.5ML IM UTD, Disp: , Rfl: 0  Does patient have record of home blood pressure readings no.   Last dose of blood pressure medication was taken at 7:00am.  Patient is asymptomatic.       BP: 130/82 , Pulse: 95.    Dr. Nazario notified.

## 2019-03-28 ENCOUNTER — OFFICE VISIT (OUTPATIENT)
Dept: INTERNAL MEDICINE | Facility: CLINIC | Age: 64
End: 2019-03-28
Payer: COMMERCIAL

## 2019-03-28 VITALS
HEIGHT: 64 IN | WEIGHT: 163.81 LBS | DIASTOLIC BLOOD PRESSURE: 68 MMHG | OXYGEN SATURATION: 97 % | HEART RATE: 88 BPM | BODY MASS INDEX: 27.96 KG/M2 | SYSTOLIC BLOOD PRESSURE: 142 MMHG

## 2019-03-28 DIAGNOSIS — J00 ACUTE NASOPHARYNGITIS: ICD-10-CM

## 2019-03-28 DIAGNOSIS — F17.210 LIGHT CIGARETTE SMOKER (1-9 CIGS/DAY): ICD-10-CM

## 2019-03-28 DIAGNOSIS — L40.9 PSORIASIS: ICD-10-CM

## 2019-03-28 DIAGNOSIS — E78.2 MIXED HYPERLIPIDEMIA: ICD-10-CM

## 2019-03-28 DIAGNOSIS — G25.0 BENIGN ESSENTIAL TREMOR: ICD-10-CM

## 2019-03-28 DIAGNOSIS — I10 ESSENTIAL HYPERTENSION: Primary | ICD-10-CM

## 2019-03-28 DIAGNOSIS — Z23 NEED FOR PNEUMOCOCCAL VACCINE: ICD-10-CM

## 2019-03-28 PROBLEM — S81.811D: Status: RESOLVED | Noted: 2019-02-20 | Resolved: 2019-03-28

## 2019-03-28 PROCEDURE — 3077F PR MOST RECENT SYSTOLIC BLOOD PRESSURE >= 140 MM HG: ICD-10-PCS | Mod: CPTII,S$GLB,, | Performed by: INTERNAL MEDICINE

## 2019-03-28 PROCEDURE — 3078F PR MOST RECENT DIASTOLIC BLOOD PRESSURE < 80 MM HG: ICD-10-PCS | Mod: CPTII,S$GLB,, | Performed by: INTERNAL MEDICINE

## 2019-03-28 PROCEDURE — 3008F PR BODY MASS INDEX (BMI) DOCUMENTED: ICD-10-PCS | Mod: CPTII,S$GLB,, | Performed by: INTERNAL MEDICINE

## 2019-03-28 PROCEDURE — 3008F BODY MASS INDEX DOCD: CPT | Mod: CPTII,S$GLB,, | Performed by: INTERNAL MEDICINE

## 2019-03-28 PROCEDURE — 3078F DIAST BP <80 MM HG: CPT | Mod: CPTII,S$GLB,, | Performed by: INTERNAL MEDICINE

## 2019-03-28 PROCEDURE — 3077F SYST BP >= 140 MM HG: CPT | Mod: CPTII,S$GLB,, | Performed by: INTERNAL MEDICINE

## 2019-03-28 PROCEDURE — 99406 PR TOBACCO USE CESSATION INTERMEDIATE 3-10 MINUTES: ICD-10-PCS | Mod: S$GLB,,, | Performed by: INTERNAL MEDICINE

## 2019-03-28 PROCEDURE — 99999 PR PBB SHADOW E&M-EST. PATIENT-LVL V: ICD-10-PCS | Mod: PBBFAC,,, | Performed by: INTERNAL MEDICINE

## 2019-03-28 PROCEDURE — 90732 PNEUMOCOCCAL POLYSACCHARIDE VACCINE 23-VALENT =>2YO SQ IM: ICD-10-PCS | Mod: S$GLB,,, | Performed by: INTERNAL MEDICINE

## 2019-03-28 PROCEDURE — 99406 BEHAV CHNG SMOKING 3-10 MIN: CPT | Mod: S$GLB,,, | Performed by: INTERNAL MEDICINE

## 2019-03-28 PROCEDURE — 90471 PNEUMOCOCCAL POLYSACCHARIDE VACCINE 23-VALENT =>2YO SQ IM: ICD-10-PCS | Mod: 59,S$GLB,, | Performed by: INTERNAL MEDICINE

## 2019-03-28 PROCEDURE — 99999 PR PBB SHADOW E&M-EST. PATIENT-LVL V: CPT | Mod: PBBFAC,,, | Performed by: INTERNAL MEDICINE

## 2019-03-28 PROCEDURE — 90732 PPSV23 VACC 2 YRS+ SUBQ/IM: CPT | Mod: S$GLB,,, | Performed by: INTERNAL MEDICINE

## 2019-03-28 PROCEDURE — 90471 IMMUNIZATION ADMIN: CPT | Mod: 59,S$GLB,, | Performed by: INTERNAL MEDICINE

## 2019-03-28 PROCEDURE — 99215 OFFICE O/P EST HI 40 MIN: CPT | Mod: 25,S$GLB,, | Performed by: INTERNAL MEDICINE

## 2019-03-28 PROCEDURE — 99215 PR OFFICE/OUTPT VISIT, EST, LEVL V, 40-54 MIN: ICD-10-PCS | Mod: 25,S$GLB,, | Performed by: INTERNAL MEDICINE

## 2019-03-28 RX ORDER — AMLODIPINE BESYLATE 10 MG/1
10 TABLET ORAL DAILY
Qty: 90 TABLET | Refills: 0 | Status: SHIPPED | OUTPATIENT
Start: 2019-03-28 | End: 2019-06-26 | Stop reason: SDUPTHER

## 2019-03-28 RX ORDER — BENZONATATE 100 MG/1
100 CAPSULE ORAL 3 TIMES DAILY PRN
Qty: 20 CAPSULE | Refills: 0 | Status: SHIPPED | OUTPATIENT
Start: 2019-03-28 | End: 2019-04-07

## 2019-03-28 NOTE — PROGRESS NOTES
"Patient was given vaccine information sheet for the Kfkosraeg95 (pneumococcal polyvalent) vaccine. The area of injection was palpated using the acromion process as a landmark. This area was cleaned with alcohol. Using a 25g 1" safety needle, 0.5mL of the vaccine was placed into the left  muscle. The injection site was dressed with a bandage. Patient experienced no complications and was discharged in stable condition. Pneumovax 23 (pneumococcal polyvalent) vaccine Lot: [id number] Exp: 04/16/2020      "

## 2019-03-28 NOTE — PATIENT INSTRUCTIONS
I recommend rest and hydration.  Tylenol and NSAIDs, such as ibuprofen, Motrin, naproxen can be helpful for sore throat, headache, ear pain, muscle and joint pain, sneezing, fatigue.  Chloroseptic spray for sore throat.  Over-the-counter antihistamines (Allegra, Claritin, Zyrtec) for runny nose and decongestants (Sudafed) can also be helpful.  Nasal saline once to twice a day.  Hand washing can help prevent the spread of respiratory illnesses, especially from younger children.

## 2019-03-28 NOTE — PROGRESS NOTES
Subjective:       Patient ID: Kristy Wallisch is a 63 y.o. female who  has a past medical history of Benign essential tremor, Cataract, Leg laceration, right, subsequent encounter (2/20/2019), Posterior vitreous detachment of right eye, and Psoriasis.    Chief Complaint: Follow-up (HTN); Otalgia (right); and Cough    Hypertension   This is a new problem. The current episode started more than 1 month ago. The problem has been gradually improving since onset. The problem is controlled. Pertinent negatives include no anxiety, blurred vision, chest pain, headaches, malaise/fatigue, neck pain, orthopnea, palpitations, peripheral edema, PND, shortness of breath or sweats. Risk factors for coronary artery disease include family history, smoking/tobacco exposure and stress. Past treatments include nothing. The current treatment provides significant improvement. There are no compliance problems.      History was obtained from the patient and supplemented through chart review.  There were no ER or clinic visits since our last appointment.    Works in [employer] for [employer].  Has a dog.    Presents today for HTN.    HTN:  Is compliant with Norvasc 5.  Took her medications at 7:00 a.m.  Tolerating meds well. Pt denies CP, SOB, lightheadedness, dizziness.  BP was controlled during nurse visit but is elevated today 148/80.  She is not taking decongestants or dextromethorphan.  Not checking BP at home and is well controlled. Does not have a smart phone, so could not participate with digital hypertension.    Enjoys being outdoors and working in her garden.  Exercises through walking daily around the neighborhood.  Takes the bus home.  Was previously eating frozen dinners during the government shutdown, but is now avoiding that.  Her  is growing lettuce in the vegetable garden.  Still smokes half a pack a day.    URI:    Was in [place] during [place] and had sudden onset fatigue.  She rested for 2  days.  Symptoms improved and then returned.  Today, her throat has been scratchy with intermittent pain with swallowing.  She has had a productive cough with yellow phlegm and no difficulty expectorating.  Notes decreased hearing in her right ear.  Denies swimming.  Denies rhinorrhea, postnasal drip, congestion. Has not tried anything OTC including decongestants or dextromethorphan.  Has been taking vitamin-C.    Sometimes coughs during gardening and takes Claritin p.r.n..    RLE laceration s/p animal bite:    Was bit by a rescue dog behind her R knee.  UTD Tdap 2-3 years ago.  Completed a course of Augmentin.  Sutures removed during her last visit.  Continues to deny fever, discharge.  Has very minimal erythema.    HLD:  Is currently taking ASA 81 daily.  Will start taking Lipitor 40 because she did not want to take all new medications at the same time.  Lab Results   Component Value Date    LDLCALC 128.4 03/07/2019     The 10-year ASCVD risk score (Saul JANE Jr., et al., 2013) is: 15.8%    Values used to calculate the score:      Age: 63 years      Sex: Female      Is Non- : No      Diabetic: No      Tobacco smoker: Yes      Systolic Blood Pressure: 142 mmHg      Is BP treated: Yes      HDL Cholesterol: 47 mg/dL      Total Cholesterol: 190 mg/dL    Tobacco use:  She smokes 0.5 ppd.  Started smoking 19 yoa.  22.5 pack year history.  Has never tried to quit before.  Work has been stressful lately.  She has been taking breaks during work to get out and smoke.  Is currently not interested in tobacco cessation classes.  NUTD on pneumococcal vaccine.    Essential tremor: not on BB.  +FHx.  Does not impair handwriting.  Not bothersome.    Psoriasis: flares with stress.    Review of Systems   Constitutional: Negative for activity change, fever, malaise/fatigue and unexpected weight change.   HENT: Positive for hearing loss. Negative for postnasal drip and rhinorrhea.    Eyes: Negative for blurred  "vision, discharge and visual disturbance.   Respiratory: Positive for cough. Negative for chest tightness, shortness of breath and wheezing.    Cardiovascular: Negative for chest pain, palpitations, orthopnea, leg swelling and PND.   Gastrointestinal: Negative for blood in stool, constipation, diarrhea and vomiting.   Endocrine: Negative for polydipsia and polyuria.   Genitourinary: Negative for difficulty urinating, dysuria, hematuria and menstrual problem.   Musculoskeletal: Negative for arthralgias, joint swelling and neck pain.   Skin: Negative for rash and wound.   Neurological: Negative for weakness and headaches.   Hematological: Negative for adenopathy.   Psychiatric/Behavioral: Negative for confusion and dysphoric mood.       I personally reviewed Past Medical History, Past Surgical History, Social History, and Family History.    Objective:      Vitals:    03/28/19 1453   BP: (!) 142/68   Pulse: 88   SpO2: 97%   Weight: 74.3 kg (163 lb 12.8 oz)   Height: 5' 4" (1.626 m)      Physical Exam   Constitutional: She appears well-developed and well-nourished. No distress.   HENT:   Head: Normocephalic and atraumatic.   Right Ear: External ear and ear canal normal. No drainage, swelling or tenderness. Tympanic membrane is not erythematous, not retracted and not bulging. No middle ear effusion. No hemotympanum. Decreased hearing is noted.   Left Ear: External ear and ear canal normal. No drainage, swelling or tenderness. Tympanic membrane is not erythematous, not retracted and not bulging.  No middle ear effusion. No hemotympanum. No decreased hearing is noted.   Nose: Nose normal. No mucosal edema. Right sinus exhibits no maxillary sinus tenderness and no frontal sinus tenderness. Left sinus exhibits no maxillary sinus tenderness and no frontal sinus tenderness.   Mouth/Throat: Posterior oropharyngeal erythema present. No oropharyngeal exudate or posterior oropharyngeal edema.   R TM not smooth.  No sclerosis, air " fluid level.  Cone of light present   Eyes: Pupils are equal, round, and reactive to light. Conjunctivae and EOM are normal. Right eye exhibits no discharge. Left eye exhibits no discharge. No scleral icterus.   Neck: Neck supple. No tracheal deviation present. No thyromegaly present.   Cardiovascular: Normal rate, regular rhythm, normal heart sounds and intact distal pulses.   No murmur heard.  Pulmonary/Chest: Effort normal and breath sounds normal. No respiratory distress. She has no wheezes.   Abdominal: Soft. Bowel sounds are normal. There is no tenderness.   Musculoskeletal: She exhibits no edema, tenderness or deformity.   Lymphadenopathy:     She has no cervical adenopathy.   Neurological: She is alert. No cranial nerve deficit. Gait normal.   Skin: Skin is warm and dry. Capillary refill takes less than 2 seconds. She is not diaphoretic. No erythema.   Psychiatric: She has a normal mood and affect. Her behavior is normal.       Lab Results   Component Value Date    WBC 5.48 03/07/2019    HGB 14.9 03/07/2019    HCT 44.8 03/07/2019     03/07/2019    CHOL 190 03/07/2019    TRIG 73 03/07/2019    HDL 47 03/07/2019    ALT 60 (H) 03/07/2019    AST 36 03/07/2019     03/07/2019    K 3.9 03/07/2019     03/07/2019    CREATININE 0.7 03/07/2019    BUN 12 03/07/2019    CO2 27 03/07/2019    TSH 1.98 09/09/2010    HGBA1C 5.6 03/07/2019       The 10-year ASCVD risk score (Saul BUCK Jr., et al., 2013) is: 15.8%    Values used to calculate the score:      Age: 63 years      Sex: Female      Is Non- : No      Diabetic: No      Tobacco smoker: Yes      Systolic Blood Pressure: 142 mmHg      Is BP treated: Yes      HDL Cholesterol: 47 mg/dL      Total Cholesterol: 190 mg/dL    (Imaging have been independently reviewed)  Leg x-ray without foreign body or fracture    Assessment:       1. Essential hypertension    2. Acute nasopharyngitis    3. Mixed hyperlipidemia    4. Light cigarette  smoker (1-9 cigs/day)    5. Need for pneumococcal vaccine    6. Benign essential tremor    7. Psoriasis          Plan:       Shanae was seen today for follow-up, otalgia and cough.    Diagnoses and all orders for this visit:    Essential hypertension  Comments:  Not at goal. Discussed tobacco cessation. Increase to Norvasc 10. Nurse visit for BP check in 1-2 weeks.  Orders:  -     amLODIPine (NORVASC) 10 MG tablet; Take 1 tablet (10 mg total) by mouth once daily.    Acute nasopharyngitis  Comments:  Rec hydration, NSAIDs/Chloraseptic spray p.r.n. for sore throat, Tessalon Perles.  Orders:  -     benzonatate (TESSALON) 100 MG capsule; Take 1 capsule (100 mg total) by mouth 3 (three) times daily as needed for Cough.    Mixed hyperlipidemia  Comments:  Continue aspirin 81.  Will start taking Lipitor 40.  ASCVD 15.8.    Light cigarette smoker (1-9 cigs/day)  Comments:  Counseled for 5 min on tobacco cessation.  22.5 pack-year history.  Not yet indicated for spiral CT.  PPSV today.     Need for pneumococcal vaccine  Comments:  Tobacco use. PPSV23 today.  In 1 year, will need PCV13.  Five years after the 1st dose of PPSV23, will need second dose of PPSV23  Orders:  -     (In Office Administered) Pneumococcal Polysaccharide Vaccine (23 Valent) (SQ/IM)    Benign essential tremor  Comments:  Has not been on BB, but symptoms are not bothersome    Psoriasis  Comments:  Flares occur during periods of stress.  No active issues    Other orders  -     Cancel: Ambulatory referral to Obstetrics / Gynecology  -     Cancel: Ambulatory referral to Gastroenterology         Notification of Lab Results: Phone Call    Side effects of medication(s) were discussed in detail and patient voiced understanding.  Patient will call back for any issues or complications.     RTC in 3 month(s) or sooner PRN for HTN.  Nurse visit for BP check in 1-2 weeks.

## 2019-03-29 DIAGNOSIS — Z12.11 COLON CANCER SCREENING: ICD-10-CM

## 2019-04-08 ENCOUNTER — CLINICAL SUPPORT (OUTPATIENT)
Dept: INTERNAL MEDICINE | Facility: CLINIC | Age: 64
End: 2019-04-08
Payer: COMMERCIAL

## 2019-04-08 VITALS — DIASTOLIC BLOOD PRESSURE: 70 MMHG | HEART RATE: 90 BPM | OXYGEN SATURATION: 96 % | SYSTOLIC BLOOD PRESSURE: 128 MMHG

## 2019-04-08 PROCEDURE — 99999 PR PBB SHADOW E&M-EST. PATIENT-LVL II: ICD-10-PCS | Mod: PBBFAC,,,

## 2019-04-08 PROCEDURE — 99999 PR PBB SHADOW E&M-EST. PATIENT-LVL II: CPT | Mod: PBBFAC,,,

## 2019-04-08 NOTE — PROGRESS NOTES
Kristy Wallisch 63 y.o. female is here today for Blood Pressure check.   History of HTN yes.    Review of patient's allergies indicates:  No Known Allergies  Creatinine   Date Value Ref Range Status   03/07/2019 0.7 0.5 - 1.4 mg/dL Final     Sodium   Date Value Ref Range Status   03/07/2019 142 136 - 145 mmol/L Final     Potassium   Date Value Ref Range Status   03/07/2019 3.9 3.5 - 5.1 mmol/L Final   ]  Patient verifies taking blood pressure medications on a regular bases at the same time of the day.     Current Outpatient Medications:     amLODIPine (NORVASC) 10 MG tablet, Take 1 tablet (10 mg total) by mouth once daily., Disp: 90 tablet, Rfl: 0    ascorbic acid (VITAMIN C) 500 MG tablet, Take by mouth. 1 Tablet Oral Every day, Disp: , Rfl:     aspirin 81 MG Chew, Take by mouth. 1 Tablet, Chewable Oral Every day, Disp: , Rfl:     atorvastatin (LIPITOR) 40 MG tablet, Take 1 tablet (40 mg total) by mouth once daily., Disp: 90 tablet, Rfl: 0    GLUCOSAM HCL/MSM/CHONDRO MONTE A (GLUCOSAMINE HCL-MSM-CHONDROITN) 375-250-300 mg Tab, Take by mouth. 1 Tablet Oral Every day, Disp: , Rfl:     multivitamin (THERAGRAN) per tablet, Take by mouth. 1 Tablet Oral Every day, Disp: , Rfl:     SHINGRIX, PF, 50 mcg/0.5 mL injection, ADM 0.5ML IM UTD, Disp: , Rfl: 0  Does patient have record of home blood pressure readings no.   Last dose of blood pressure medication was taken at 1100 am.  Patient is asymptomatic.       BP: 128/70 , Pulse: 90.      Dr. Nazario notified.   Pt comes in for bp check and bp is at normal range

## 2019-06-12 ENCOUNTER — PATIENT MESSAGE (OUTPATIENT)
Dept: ADMINISTRATIVE | Facility: HOSPITAL | Age: 64
End: 2019-06-12

## 2019-06-12 ENCOUNTER — PATIENT OUTREACH (OUTPATIENT)
Dept: ADMINISTRATIVE | Facility: HOSPITAL | Age: 64
End: 2019-06-12

## 2019-06-12 DIAGNOSIS — Z12.11 COLON CANCER SCREENING: Primary | ICD-10-CM

## 2019-06-12 DIAGNOSIS — Z12.4 CERVICAL CANCER SCREENING: ICD-10-CM

## 2019-06-17 DIAGNOSIS — Z91.89 CANDIDATE FOR STATIN THERAPY DUE TO RISK OF FUTURE CARDIOVASCULAR EVENT: ICD-10-CM

## 2019-06-18 RX ORDER — ATORVASTATIN CALCIUM 40 MG/1
TABLET, FILM COATED ORAL
Qty: 90 TABLET | Refills: 0 | Status: SHIPPED | OUTPATIENT
Start: 2019-06-18 | End: 2019-06-26 | Stop reason: SDUPTHER

## 2019-06-26 NOTE — PROGRESS NOTES
Subjective:       Patient ID: Kristy Wallisch is a 63 y.o. female who  has a past medical history of Benign essential tremor, Cataract, Leg laceration, right, subsequent encounter (2/20/2019), Posterior vitreous detachment of right eye, and Psoriasis.    Chief Complaint: Follow-up and Hypertension    HPI    History was obtained from the patient and supplemented through chart review.  There were no ER or clinic visits since our last appointment.    Works in  for [employer].  Has a dog.    HTN:  Is compliant with Norvasc 10.  Tolerating meds well. Pt denies CP, SOB, lightheadedness, dizziness, leg edema.  Does not have a smart phone, so could not participate with digital hypertension.  Does not have blood pressure cuff at home.    Enjoys being outdoors and working in her garden.  Exercises through walking daily around the neighborhood.  Takes the bus home.  Cooks at home; no longer eating frozen dinners.  Her  is growing lettuce in the vegetable garden.  Still smokes half a pack a day.    HLD:  Is currently taking ASA 81, Lipitor 40 daily.    Lab Results   Component Value Date    LDLCALC 128.4 03/07/2019     The 10-year ASCVD risk score ([place] BUCK Jr., et al., 2013) is: 15.8%    Values used to calculate the score:      Age: 63 years      Sex: Female      Is Non- : No      Diabetic: No      Tobacco smoker: Yes      Systolic Blood Pressure: 142 mmHg      Is BP treated: Yes      HDL Cholesterol: 47 mg/dL      Total Cholesterol: 190 mg/dL    Tobacco use:   She smokes 0.5 ppd.  Started smoking 19 yoa.  22.5 pack year history.  Has never tried to quit before.  Sometimes smokes due to stress at work.  Is currently not interested in tobacco cessation classes.  UTD on PPSV 23 .    Essential tremor: not on BB.  +FHx.  Does not impair handwriting.  Not bothersome.    Psoriasis: flares with stress.    Review of Systems   Constitutional: Negative for activity  "change, fever and unexpected weight change.   HENT: Negative for postnasal drip, rhinorrhea and trouble swallowing.    Eyes: Negative for discharge and visual disturbance.   Respiratory: Negative for cough, chest tightness and wheezing.    Cardiovascular: Negative for chest pain, palpitations and leg swelling.   Gastrointestinal: Negative for blood in stool, constipation, diarrhea and vomiting.   Endocrine: Negative for polydipsia and polyuria.   Genitourinary: Negative for difficulty urinating, dysuria, hematuria and menstrual problem.   Musculoskeletal: Negative for arthralgias, joint swelling and neck pain.   Skin: Negative for rash and wound.   Neurological: Negative for weakness and headaches.   Hematological: Negative for adenopathy.   Psychiatric/Behavioral: Negative for confusion and dysphoric mood.       I personally reviewed Past Medical History, Past Surgical History, Social History, and Family History.    Objective:      Vitals:    06/27/19 1540   BP: (!) 142/80   Pulse: 79   SpO2: 97%   Weight: 73.9 kg (162 lb 14.7 oz)   Height: 5' 4" (1.626 m)      Physical Exam   Constitutional: She appears well-developed and well-nourished. No distress.   HENT:   Head: Normocephalic and atraumatic.   Nose: Nose normal. No mucosal edema.   Mouth/Throat: No oropharyngeal exudate, posterior oropharyngeal edema or posterior oropharyngeal erythema.   Eyes: Pupils are equal, round, and reactive to light. Conjunctivae and EOM are normal. Right eye exhibits no discharge. Left eye exhibits no discharge. No scleral icterus.   Neck: Neck supple. No tracheal deviation present. No thyromegaly present.   Cardiovascular: Normal rate, regular rhythm, normal heart sounds and intact distal pulses.   No murmur heard.  Pulmonary/Chest: Effort normal and breath sounds normal. No respiratory distress. She has no wheezes.   Abdominal: Soft. Bowel sounds are normal. There is no tenderness.   Musculoskeletal: She exhibits edema. She exhibits " no tenderness or deformity.   1+ BLE edema   Lymphadenopathy:     She has no cervical adenopathy.   Neurological: She is alert. No cranial nerve deficit. Gait normal.   Skin: Skin is warm and dry. Capillary refill takes less than 2 seconds. She is not diaphoretic. No erythema.   Psychiatric: She has a normal mood and affect. Her behavior is normal.         Lab Results   Component Value Date    WBC 5.48 03/07/2019    HGB 14.9 03/07/2019    HCT 44.8 03/07/2019     03/07/2019    CHOL 190 03/07/2019    TRIG 73 03/07/2019    HDL 47 03/07/2019    ALT 60 (H) 03/07/2019    AST 36 03/07/2019     03/07/2019    K 3.9 03/07/2019     03/07/2019    CREATININE 0.7 03/07/2019    BUN 12 03/07/2019    CO2 27 03/07/2019    TSH 1.98 09/09/2010    HGBA1C 5.6 03/07/2019       The 10-year ASCVD risk score ([place] JANE Jr., et al., 2013) is: 15.8%    Values used to calculate the score:      Age: 63 years      Sex: Female      Is Non- : No      Diabetic: No      Tobacco smoker: Yes      Systolic Blood Pressure: 142 mmHg      Is BP treated: Yes      HDL Cholesterol: 47 mg/dL      Total Cholesterol: 190 mg/dL    (Imaging have been independently reviewed)  Leg x-ray without foreign body or fracture    Assessment:       1. Essential hypertension    2. Mixed hyperlipidemia    3. Light cigarette smoker (1-9 cigs/day)    4. Benign essential tremor    5. Psoriasis    6. Cervical cancer screening    7. Colon cancer screening          Plan:       Shanae was seen today for follow-up and hypertension.    Diagnoses and all orders for this visit:    Essential hypertension  Comments:  Not at goal. Discussed tobacco cessation. Cont Norvasc 10.  Add HCTZ 12.5 due to leg edema. Nurse visit for BP check in 1-2 weeks.  Orders:  -     amLODIPine (NORVASC) 10 MG tablet; Take 1 tablet (10 mg total) by mouth once daily.  -     hydroCHLOROthiazide (HYDRODIURIL) 12.5 MG Tab; Take 1 tablet (12.5 mg total) by mouth once  daily.    Mixed hyperlipidemia  Comments:  Continue aspirin 81, taking Lipitor 40 due to elevated ASCVD.  Orders:  -     atorvastatin (LIPITOR) 40 MG tablet; Take 1 tablet (40 mg total) by mouth once daily.    Light cigarette smoker (1-9 cigs/day)  Comments:  Counseled for 5 min on tobacco cessation.  22.5 pack-year history.  Not yet indicated for spiral CT.  PPSV 3/2019. Will need PCV13 in 3/2020.      Benign essential tremor  Comments:  Has not been on BB, but symptoms are not bothersome    Psoriasis  Comments:  Flares occur during periods of stress.  No active issues    Cervical cancer screening  -     Ambulatory Referral to Obstetrics / Gynecology    Colon cancer screening  -     Ambulatory referral to Gastroenterology    Other orders  -     Cancel: losartan (COZAAR) 50 MG tablet; Take 1 tablet (50 mg total) by mouth once daily.         Side effects of medication(s) were discussed in detail and patient voiced understanding.  Patient will call back for any issues or complications.     RTC in 3 month(s) or sooner PRN for HTN.  Nurse visit for BP check in 1-2 weeks.

## 2019-06-27 ENCOUNTER — OFFICE VISIT (OUTPATIENT)
Dept: INTERNAL MEDICINE | Facility: CLINIC | Age: 64
End: 2019-06-27
Payer: COMMERCIAL

## 2019-06-27 VITALS
WEIGHT: 162.94 LBS | HEART RATE: 79 BPM | BODY MASS INDEX: 27.82 KG/M2 | OXYGEN SATURATION: 97 % | HEIGHT: 64 IN | SYSTOLIC BLOOD PRESSURE: 142 MMHG | DIASTOLIC BLOOD PRESSURE: 80 MMHG

## 2019-06-27 DIAGNOSIS — F17.210 LIGHT CIGARETTE SMOKER (1-9 CIGS/DAY): ICD-10-CM

## 2019-06-27 DIAGNOSIS — L40.9 PSORIASIS: ICD-10-CM

## 2019-06-27 DIAGNOSIS — Z12.11 COLON CANCER SCREENING: ICD-10-CM

## 2019-06-27 DIAGNOSIS — Z12.4 CERVICAL CANCER SCREENING: ICD-10-CM

## 2019-06-27 DIAGNOSIS — E78.2 MIXED HYPERLIPIDEMIA: ICD-10-CM

## 2019-06-27 DIAGNOSIS — G25.0 BENIGN ESSENTIAL TREMOR: ICD-10-CM

## 2019-06-27 DIAGNOSIS — I10 ESSENTIAL HYPERTENSION: Primary | ICD-10-CM

## 2019-06-27 PROCEDURE — 99396 PR PREVENTIVE VISIT,EST,40-64: ICD-10-PCS | Mod: 25,S$GLB,, | Performed by: INTERNAL MEDICINE

## 2019-06-27 PROCEDURE — 3077F PR MOST RECENT SYSTOLIC BLOOD PRESSURE >= 140 MM HG: ICD-10-PCS | Mod: CPTII,S$GLB,, | Performed by: INTERNAL MEDICINE

## 2019-06-27 PROCEDURE — 3079F PR MOST RECENT DIASTOLIC BLOOD PRESSURE 80-89 MM HG: ICD-10-PCS | Mod: CPTII,S$GLB,, | Performed by: INTERNAL MEDICINE

## 2019-06-27 PROCEDURE — 99406 PR TOBACCO USE CESSATION INTERMEDIATE 3-10 MINUTES: ICD-10-PCS | Mod: S$GLB,,, | Performed by: INTERNAL MEDICINE

## 2019-06-27 PROCEDURE — 3079F DIAST BP 80-89 MM HG: CPT | Mod: CPTII,S$GLB,, | Performed by: INTERNAL MEDICINE

## 2019-06-27 PROCEDURE — 3077F SYST BP >= 140 MM HG: CPT | Mod: CPTII,S$GLB,, | Performed by: INTERNAL MEDICINE

## 2019-06-27 PROCEDURE — 99999 PR PBB SHADOW E&M-EST. PATIENT-LVL IV: ICD-10-PCS | Mod: PBBFAC,,, | Performed by: INTERNAL MEDICINE

## 2019-06-27 PROCEDURE — 99396 PREV VISIT EST AGE 40-64: CPT | Mod: 25,S$GLB,, | Performed by: INTERNAL MEDICINE

## 2019-06-27 PROCEDURE — 99999 PR PBB SHADOW E&M-EST. PATIENT-LVL IV: CPT | Mod: PBBFAC,,, | Performed by: INTERNAL MEDICINE

## 2019-06-27 PROCEDURE — 99406 BEHAV CHNG SMOKING 3-10 MIN: CPT | Mod: S$GLB,,, | Performed by: INTERNAL MEDICINE

## 2019-06-27 RX ORDER — AMLODIPINE BESYLATE 10 MG/1
10 TABLET ORAL DAILY
Qty: 90 TABLET | Refills: 1 | Status: SHIPPED | OUTPATIENT
Start: 2019-06-27 | End: 2019-10-31 | Stop reason: SDUPTHER

## 2019-06-27 RX ORDER — HYDROCHLOROTHIAZIDE 12.5 MG/1
12.5 TABLET ORAL DAILY
Qty: 90 TABLET | Refills: 1 | Status: SHIPPED | OUTPATIENT
Start: 2019-06-27 | End: 2019-10-31 | Stop reason: SDUPTHER

## 2019-06-27 RX ORDER — ATORVASTATIN CALCIUM 40 MG/1
40 TABLET, FILM COATED ORAL DAILY
Qty: 90 TABLET | Refills: 3 | Status: SHIPPED | OUTPATIENT
Start: 2019-06-27 | End: 2020-02-03 | Stop reason: SDUPTHER

## 2019-06-27 RX ORDER — LOSARTAN POTASSIUM 50 MG/1
50 TABLET ORAL DAILY
Qty: 90 TABLET | Refills: 1 | Status: CANCELLED | OUTPATIENT
Start: 2019-06-27 | End: 2020-06-26

## 2019-07-09 ENCOUNTER — CLINICAL SUPPORT (OUTPATIENT)
Dept: INTERNAL MEDICINE | Facility: CLINIC | Age: 64
End: 2019-07-09
Payer: COMMERCIAL

## 2019-07-09 ENCOUNTER — TELEPHONE (OUTPATIENT)
Dept: INTERNAL MEDICINE | Facility: CLINIC | Age: 64
End: 2019-07-09

## 2019-07-09 ENCOUNTER — HOSPITAL ENCOUNTER (OUTPATIENT)
Dept: RADIOLOGY | Facility: OTHER | Age: 64
Discharge: HOME | End: 2019-07-09
Attending: INTERNAL MEDICINE
Payer: COMMERCIAL

## 2019-07-09 VITALS — HEART RATE: 88 BPM | SYSTOLIC BLOOD PRESSURE: 130 MMHG | DIASTOLIC BLOOD PRESSURE: 78 MMHG | OXYGEN SATURATION: 98 %

## 2019-07-09 DIAGNOSIS — Z12.39 BREAST CANCER SCREENING: ICD-10-CM

## 2019-07-09 PROCEDURE — 99999 PR PBB SHADOW E&M-EST. PATIENT-LVL I: ICD-10-PCS | Mod: PBBFAC,,,

## 2019-07-09 PROCEDURE — 77063 MAMMO DIGITAL SCREENING BILAT WITH TOMOSYNTHESIS_CAD: ICD-10-PCS | Mod: 26,,, | Performed by: RADIOLOGY

## 2019-07-09 PROCEDURE — 77067 SCR MAMMO BI INCL CAD: CPT | Mod: 26,,, | Performed by: RADIOLOGY

## 2019-07-09 PROCEDURE — 77063 BREAST TOMOSYNTHESIS BI: CPT | Mod: 26,,, | Performed by: RADIOLOGY

## 2019-07-09 PROCEDURE — 77067 MAMMO DIGITAL SCREENING BILAT WITH TOMOSYNTHESIS_CAD: ICD-10-PCS | Mod: 26,,, | Performed by: RADIOLOGY

## 2019-07-09 PROCEDURE — 99999 PR PBB SHADOW E&M-EST. PATIENT-LVL I: CPT | Mod: PBBFAC,,,

## 2019-07-09 PROCEDURE — 77067 SCR MAMMO BI INCL CAD: CPT | Mod: TC

## 2019-07-09 NOTE — PROGRESS NOTES
Kristy Wallisch 63 y.o. female is here today for Blood Pressure check.   History of HTN yes.    Review of patient's allergies indicates:  No Known Allergies  Creatinine   Date Value Ref Range Status   03/07/2019 0.7 0.5 - 1.4 mg/dL Final     Sodium   Date Value Ref Range Status   03/07/2019 142 136 - 145 mmol/L Final     Potassium   Date Value Ref Range Status   03/07/2019 3.9 3.5 - 5.1 mmol/L Final   ]  Patient verifies taking blood pressure medications on a regular basis at the same time of the day.     Current Outpatient Medications:     amLODIPine (NORVASC) 10 MG tablet, Take 1 tablet (10 mg total) by mouth once daily., Disp: 90 tablet, Rfl: 1    ascorbic acid (VITAMIN C) 500 MG tablet, Take by mouth. 1 Tablet Oral Every day, Disp: , Rfl:     aspirin 81 MG Chew, Take by mouth. 1 Tablet, Chewable Oral Every day, Disp: , Rfl:     atorvastatin (LIPITOR) 40 MG tablet, Take 1 tablet (40 mg total) by mouth once daily., Disp: 90 tablet, Rfl: 3    GLUCOSAM HCL/MSM/CHONDRO MONTE A (GLUCOSAMINE HCL-MSM-CHONDROITN) 375-250-300 mg Tab, Take by mouth. 1 Tablet Oral Every day, Disp: , Rfl:     hydroCHLOROthiazide (HYDRODIURIL) 12.5 MG Tab, Take 1 tablet (12.5 mg total) by mouth once daily., Disp: 90 tablet, Rfl: 1    multivitamin (THERAGRAN) per tablet, Take by mouth. 1 Tablet Oral Every day, Disp: , Rfl:   Does patient have record of home blood pressure readings no. Readings have been averaging unknown.   Last dose of blood pressure medication was taken at 0700am.  Patient is asymptomatic.       BP: 130/78 , Pulse: 88 .      Dr. Nazario notified.

## 2019-07-09 NOTE — TELEPHONE ENCOUNTER
Does patient have record of home blood pressure readings no. Readings have been averaging unknown.   Last dose of blood pressure medication was taken at 0700am.  Patient is asymptomatic.       BP: 130/78 , Pulse: 88

## 2019-07-09 NOTE — Clinical Note
Does patient have record of home blood pressure readings no. Readings have been averaging unknown. Last dose of blood pressure medication was taken at 0700am.Patient is asymptomatic. BP: 130/78 , Pulse: 88

## 2019-07-26 DIAGNOSIS — Z12.11 COLON CANCER SCREENING: ICD-10-CM

## 2019-10-21 ENCOUNTER — OFFICE VISIT (OUTPATIENT)
Dept: URGENT CARE | Facility: CLINIC | Age: 64
End: 2019-10-21
Payer: COMMERCIAL

## 2019-10-21 VITALS
RESPIRATION RATE: 16 BRPM | HEIGHT: 64 IN | TEMPERATURE: 98 F | HEART RATE: 78 BPM | WEIGHT: 150 LBS | BODY MASS INDEX: 25.61 KG/M2 | OXYGEN SATURATION: 99 % | DIASTOLIC BLOOD PRESSURE: 76 MMHG | SYSTOLIC BLOOD PRESSURE: 144 MMHG

## 2019-10-21 DIAGNOSIS — M54.32 LEFT SCIATIC NERVE PAIN: Primary | ICD-10-CM

## 2019-10-21 PROCEDURE — 3078F DIAST BP <80 MM HG: CPT | Mod: CPTII,S$GLB,, | Performed by: FAMILY MEDICINE

## 2019-10-21 PROCEDURE — 3008F PR BODY MASS INDEX (BMI) DOCUMENTED: ICD-10-PCS | Mod: CPTII,S$GLB,, | Performed by: FAMILY MEDICINE

## 2019-10-21 PROCEDURE — 3077F PR MOST RECENT SYSTOLIC BLOOD PRESSURE >= 140 MM HG: ICD-10-PCS | Mod: CPTII,S$GLB,, | Performed by: FAMILY MEDICINE

## 2019-10-21 PROCEDURE — 3077F SYST BP >= 140 MM HG: CPT | Mod: CPTII,S$GLB,, | Performed by: FAMILY MEDICINE

## 2019-10-21 PROCEDURE — 99214 PR OFFICE/OUTPT VISIT, EST, LEVL IV, 30-39 MIN: ICD-10-PCS | Mod: S$GLB,,, | Performed by: FAMILY MEDICINE

## 2019-10-21 PROCEDURE — 3078F PR MOST RECENT DIASTOLIC BLOOD PRESSURE < 80 MM HG: ICD-10-PCS | Mod: CPTII,S$GLB,, | Performed by: FAMILY MEDICINE

## 2019-10-21 PROCEDURE — 99214 OFFICE O/P EST MOD 30 MIN: CPT | Mod: S$GLB,,, | Performed by: FAMILY MEDICINE

## 2019-10-21 PROCEDURE — 3008F BODY MASS INDEX DOCD: CPT | Mod: CPTII,S$GLB,, | Performed by: FAMILY MEDICINE

## 2019-10-21 RX ORDER — CYCLOBENZAPRINE HCL 10 MG
10 TABLET ORAL 3 TIMES DAILY PRN
Qty: 21 TABLET | Refills: 0 | Status: SHIPPED | OUTPATIENT
Start: 2019-10-21 | End: 2020-02-03

## 2019-10-21 RX ORDER — NAPROXEN 500 MG/1
500 TABLET ORAL 2 TIMES DAILY WITH MEALS
Qty: 14 TABLET | Refills: 0 | Status: SHIPPED | OUTPATIENT
Start: 2019-10-21 | End: 2019-10-28

## 2019-10-21 NOTE — PROGRESS NOTES
"Subjective:       Patient ID: Kristy Wallisch is a 63 y.o. female.    Vitals:    10/21/19 1240   BP: (!) 144/76   Pulse: 78   Resp: 16   Temp: 97.5 °F (36.4 °C)   TempSrc: Oral   SpO2: 99%   Weight: 68 kg (150 lb)   Height: 5' 4" (1.626 m)       Chief Complaint: Hip Pain (Left hip)    Patient reports that she has had pain in left hip that goes down to knee since Saturday.Patient reports Hx of sciatica in right hip and this feels similar.  No heavy lifting straining or injury to the back.  No history of arthritis in the hip.  No leg numbness or loss of bowel or bladder function.  Worse with standing moving weight-bearing    Hip Pain    There was no injury mechanism. The pain is present in the left leg. The pain is at a severity of 8/10. The pain has been constant since onset. She reports no foreign bodies present. The symptoms are aggravated by weight bearing and movement. She has tried heat (aspirin icey hot) for the symptoms. The treatment provided no relief.     Review of Systems   Constitution: Negative for chills and fever.   HENT: Negative for sore throat.    Eyes: Negative for blurred vision.   Cardiovascular: Negative for chest pain.   Respiratory: Negative for shortness of breath.    Skin: Negative for rash.   Musculoskeletal: Negative for back pain and joint pain.        Left hip pain   Gastrointestinal: Negative for abdominal pain, diarrhea, nausea and vomiting.   Neurological: Negative for headaches.   Psychiatric/Behavioral: The patient is not nervous/anxious.        Objective:      Physical Exam   Constitutional: She is oriented to person, place, and time. Vital signs are normal. She appears well-developed and well-nourished. She is active and cooperative. No distress.   HENT:   Head: Normocephalic and atraumatic.   Nose: Nose normal.   Mouth/Throat: Oropharynx is clear and moist and mucous membranes are normal.   Eyes: Conjunctivae and lids are normal.   Neck: Trachea normal, normal range of motion, " full passive range of motion without pain and phonation normal. Neck supple.   Cardiovascular: Normal rate, regular rhythm, normal heart sounds, intact distal pulses and normal pulses.   Pulmonary/Chest: Effort normal and breath sounds normal.   Abdominal: Soft. Normal appearance and bowel sounds are normal. She exhibits no abdominal bruit, no pulsatile midline mass and no mass.   Musculoskeletal: She exhibits no edema or deformity.        Lumbar back: She exhibits normal range of motion and no bony tenderness.        Back:    Positive SLR left   No rash   Neurological: She is alert and oriented to person, place, and time. She has normal strength and normal reflexes. No sensory deficit.   Skin: Skin is warm, dry and intact. She is not diaphoretic.   Psychiatric: She has a normal mood and affect. Her speech is normal and behavior is normal. Judgment and thought content normal. Cognition and memory are normal.   Nursing note and vitals reviewed.      Assessment:       1. Left sciatic nerve pain        Plan:       Shanae was seen today for hip pain.    Diagnoses and all orders for this visit:    Left sciatic nerve pain  -     cyclobenzaprine (FLEXERIL) 10 MG tablet; Take 1 tablet (10 mg total) by mouth 3 (three) times daily as needed. This medication will make you drowsy.  -     naproxen (NAPROSYN) 500 MG tablet; Take 1 tablet (500 mg total) by mouth 2 (two) times daily with meals. for 7 days      Patient Instructions   PLEASE READ YOUR DISCHARGE INSTRUCTIONS ENTIRELY AS IT CONTAINS IMPORTANT INFORMATION.      Please drink plenty of fluids.  Please get plenty of rest.  Ice to the area.   You may do gently stretching if tolerable.    Please return here or go to the Emergency Department for any concerns or worsening of condition.    If you were prescribed a narcotic medication or muscle relaxer (flexeril), do not drive or operate heavy equipment or machinery while taking these medications. Take a half first to see how it  affects you    Take the naproxen with food. Also take an over the counter antacid with it (prilosec, Nexium, Pepcid) to protect your stomach.     If you were not prescribed an anti-inflammatory medication, and if you do not have any history of stomach/intestinal ulcers, or kidney disease, or are not taking a blood thinner such as Coumadin, Plavix, Pradaxa, Eloquis, or Xaralta for example, it is OK to take over the counter Ibuprofen or Advil or Motrin or Aleve as directed.  Do not take these medications on an empty stomach.    If you lose control of your bowel and/or bladder, please go to the nearest Emergency Department immediately.    If you lose sensation in between your legs by your genitalia and/or rectum, please go to the nearest Emergency Department immediately.    If you lose control or sensation of any extremity, please go to the nearest Emergency Department immediately.    Do not stay in one position to long.  When sleeping on your back place a pillow under knees to reduce tension on back.  If sleeping on your side, place pillow between knees to keep spine in better alinement.  Wear supportive shoes such as tennis shoes for support of the lower back.  Take any medication as directed.    Please follow up with your primary care doctor or specialist as needed.    If you  smoke, please stop smoking.      Please arrange follow up with your primary medical clinic as soon as possible. You must understand that you've received an Urgent Care treatment only and that you may be released before all of your medical problems are known or treated. You, the patient, will arrange for follow up as instructed. If your symptoms worsen or fail to improve you should go to the Emergency Room.      Sciatica    Sciatica is a condition that causes pain in the lower back that spreads down into the buttock, hip, and leg. Sometimes the leg pain can happen without any back pain. Sciatica happens when a spinal nerve is irritated or has  pressure put on it as comes out of the spinal canal in the lower back. This most often happens when a bulge or rupture of a nearby spinal disk presses on the nerve. Sciatica can also be caused by a narrowing of the spinal canal (spinal stenosis) or spasm of the muscle in the buttocks that the sciatic nerve passes through (pyriform muscle). Sciatica is also called lumbar radiculopathy.  Sciatica may begin after a sudden twisting or bending force, such as in a car accident. Or it can happen after a simple awkward movement. In either case, muscle spasm often also happens. Muscle spasm makes the pain worse.  A healthcare provider makes a diagnosis of sciatica from your symptoms and a physical exam. Unless you had an injury from a car accident or fall, you usually wont have X-rays taken at this time. This is because the nerves and disks in your back cant be seen on an X-ray. If the provider sees signs of a compressed nerve, you will need to schedule an MRI scan as an outpatient. Signs of a compressed nerve include loss of strength in a leg.  Most sciatica gets better with medicine, exercise, and physical therapy. If your symptoms continue after at least 3 months of medical treatment, you may need surgery or injections to your lower back.  Home care  Follow these tips when caring for yourself at home:  · You may need to stay in bed the first few days. But as soon as possible, begin sitting up or walking. This will help you avoid problems that come from staying in bed for long periods.  · When in bed, try to find a position that is comfortable. A firm mattress is best. Try lying flat on your back with pillows under your knees. You can also try lying on your side with your knees bent up toward your chest and a pillow between your knees.  · Avoid sitting for long periods. This puts more stress on your lower back than standing or walking.  · Use heat from a hot shower, hot bath, or heating pad to help ease pain. Massage can  also help. You can also try using an ice pack. You can make your own ice pack by putting ice cubes in a plastic bag. Wrap the bag in a thin towel. Try both heat and cold to see which works best. Use the method that feels best for 20 minutes several times a day.  · You may use acetaminophen or ibuprofen to ease pain, unless another pain medicine was prescribed. Note: If you have chronic liver or kidney disease, talk with your healthcare provider before taking these medicines. Also talk with your provider if youve had a stomach ulcer or gastrointestinal bleeding.  · Use safe lifting methods. Dont lift anything heavier than 15 pounds until all of the pain is gone.  Follow-up care  Follow up with your healthcare provider, or as advised. You may need physical therapy or additional tests.  If X-rays were taken, a radiologist will look at them. You will be told of any new findings that may affect your care.  When to seek medical advice  Call your healthcare provider right away if any of these occur:  · Pain gets worse even after taking prescribed medicine  · Weakness or numbness in 1 or both legs or hips  · Numbness in your groin or genital area  · You cant control your bowel or bladder  · Fever  · Redness or swelling over your back or spine   Date Last Reviewed: 8/1/2016  © 3141-3194 [employer], [employer]. [address] [place] [address], [place] [address]. All rights reserved. This information is not intended as a substitute for professional medical care. Always follow your healthcare professional's instructions.

## 2019-10-21 NOTE — PATIENT INSTRUCTIONS
PLEASE READ YOUR DISCHARGE INSTRUCTIONS ENTIRELY AS IT CONTAINS IMPORTANT INFORMATION.      Please drink plenty of fluids.  Please get plenty of rest.  Ice to the area.   You may do gently stretching if tolerable.    Please return here or go to the Emergency Department for any concerns or worsening of condition.    If you were prescribed a narcotic medication or muscle relaxer (flexeril), do not drive or operate heavy equipment or machinery while taking these medications. Take a half first to see how it affects you    Take the naproxen with food. Also take an over the counter antacid with it (prilosec, Nexium, Pepcid) to protect your stomach.     If you were not prescribed an anti-inflammatory medication, and if you do not have any history of stomach/intestinal ulcers, or kidney disease, or are not taking a blood thinner such as Coumadin, Plavix, Pradaxa, Eloquis, or Xaralta for example, it is OK to take over the counter Ibuprofen or Advil or Motrin or Aleve as directed.  Do not take these medications on an empty stomach.    If you lose control of your bowel and/or bladder, please go to the nearest Emergency Department immediately.    If you lose sensation in between your legs by your genitalia and/or rectum, please go to the nearest Emergency Department immediately.    If you lose control or sensation of any extremity, please go to the nearest Emergency Department immediately.    Do not stay in one position to long.  When sleeping on your back place a pillow under knees to reduce tension on back.  If sleeping on your side, place pillow between knees to keep spine in better alinement.  Wear supportive shoes such as tennis shoes for support of the lower back.  Take any medication as directed.    Please follow up with your primary care doctor or specialist as needed.    If you  smoke, please stop smoking.      Please arrange follow up with your primary medical clinic as soon as possible. You must understand that  you've received an Urgent Care treatment only and that you may be released before all of your medical problems are known or treated. You, the patient, will arrange for follow up as instructed. If your symptoms worsen or fail to improve you should go to the Emergency Room.      Sciatica    Sciatica is a condition that causes pain in the lower back that spreads down into the buttock, hip, and leg. Sometimes the leg pain can happen without any back pain. Sciatica happens when a spinal nerve is irritated or has pressure put on it as comes out of the spinal canal in the lower back. This most often happens when a bulge or rupture of a nearby spinal disk presses on the nerve. Sciatica can also be caused by a narrowing of the spinal canal (spinal stenosis) or spasm of the muscle in the buttocks that the sciatic nerve passes through (pyriform muscle). Sciatica is also called lumbar radiculopathy.  Sciatica may begin after a sudden twisting or bending force, such as in a car accident. Or it can happen after a simple awkward movement. In either case, muscle spasm often also happens. Muscle spasm makes the pain worse.  A healthcare provider makes a diagnosis of sciatica from your symptoms and a physical exam. Unless you had an injury from a car accident or fall, you usually wont have X-rays taken at this time. This is because the nerves and disks in your back cant be seen on an X-ray. If the provider sees signs of a compressed nerve, you will need to schedule an MRI scan as an outpatient. Signs of a compressed nerve include loss of strength in a leg.  Most sciatica gets better with medicine, exercise, and physical therapy. If your symptoms continue after at least 3 months of medical treatment, you may need surgery or injections to your lower back.  Home care  Follow these tips when caring for yourself at home:  · You may need to stay in bed the first few days. But as soon as possible, begin sitting up or walking. This will  help you avoid problems that come from staying in bed for long periods.  · When in bed, try to find a position that is comfortable. A firm mattress is best. Try lying flat on your back with pillows under your knees. You can also try lying on your side with your knees bent up toward your chest and a pillow between your knees.  · Avoid sitting for long periods. This puts more stress on your lower back than standing or walking.  · Use heat from a hot shower, hot bath, or heating pad to help ease pain. Massage can also help. You can also try using an ice pack. You can make your own ice pack by putting ice cubes in a plastic bag. Wrap the bag in a thin towel. Try both heat and cold to see which works best. Use the method that feels best for 20 minutes several times a day.  · You may use acetaminophen or ibuprofen to ease pain, unless another pain medicine was prescribed. Note: If you have chronic liver or kidney disease, talk with your healthcare provider before taking these medicines. Also talk with your provider if youve had a stomach ulcer or gastrointestinal bleeding.  · Use safe lifting methods. Dont lift anything heavier than 15 pounds until all of the pain is gone.  Follow-up care  Follow up with your healthcare provider, or as advised. You may need physical therapy or additional tests.  If X-rays were taken, a radiologist will look at them. You will be told of any new findings that may affect your care.  When to seek medical advice  Call your healthcare provider right away if any of these occur:  · Pain gets worse even after taking prescribed medicine  · Weakness or numbness in 1 or both legs or hips  · Numbness in your groin or genital area  · You cant control your bowel or bladder  · Fever  · Redness or swelling over your back or spine   Date Last Reviewed: 8/1/2016  © 0338-2496 [employer]. [address] [place] [address], [place] [address]. All rights reserved. This information is not intended as a  substitute for professional medical care. Always follow your healthcare professional's instructions.

## 2019-10-25 ENCOUNTER — TELEPHONE (OUTPATIENT)
Dept: URGENT CARE | Facility: CLINIC | Age: 64
End: 2019-10-25

## 2019-10-31 ENCOUNTER — OFFICE VISIT (OUTPATIENT)
Dept: INTERNAL MEDICINE | Facility: CLINIC | Age: 64
End: 2019-10-31
Payer: COMMERCIAL

## 2019-10-31 VITALS
HEART RATE: 94 BPM | BODY MASS INDEX: 27.25 KG/M2 | OXYGEN SATURATION: 98 % | DIASTOLIC BLOOD PRESSURE: 84 MMHG | SYSTOLIC BLOOD PRESSURE: 128 MMHG | HEIGHT: 64 IN | WEIGHT: 159.63 LBS

## 2019-10-31 DIAGNOSIS — Z12.11 COLON CANCER SCREENING: ICD-10-CM

## 2019-10-31 DIAGNOSIS — I10 ESSENTIAL HYPERTENSION: ICD-10-CM

## 2019-10-31 DIAGNOSIS — F17.210 LIGHT CIGARETTE SMOKER (1-9 CIGS/DAY): ICD-10-CM

## 2019-10-31 DIAGNOSIS — Z12.4 CERVICAL CANCER SCREENING: ICD-10-CM

## 2019-10-31 DIAGNOSIS — G57.02 PIRIFORMIS SYNDROME, LEFT: Primary | ICD-10-CM

## 2019-10-31 PROBLEM — M25.552 LEFT HIP PAIN: Status: ACTIVE | Noted: 2019-10-31

## 2019-10-31 PROCEDURE — 3008F PR BODY MASS INDEX (BMI) DOCUMENTED: ICD-10-PCS | Mod: CPTII,S$GLB,, | Performed by: INTERNAL MEDICINE

## 2019-10-31 PROCEDURE — 3074F PR MOST RECENT SYSTOLIC BLOOD PRESSURE < 130 MM HG: ICD-10-PCS | Mod: CPTII,S$GLB,, | Performed by: INTERNAL MEDICINE

## 2019-10-31 PROCEDURE — 99999 PR PBB SHADOW E&M-EST. PATIENT-LVL IV: CPT | Mod: PBBFAC,,, | Performed by: INTERNAL MEDICINE

## 2019-10-31 PROCEDURE — 99215 PR OFFICE/OUTPT VISIT, EST, LEVL V, 40-54 MIN: ICD-10-PCS | Mod: S$GLB,,, | Performed by: INTERNAL MEDICINE

## 2019-10-31 PROCEDURE — 3008F BODY MASS INDEX DOCD: CPT | Mod: CPTII,S$GLB,, | Performed by: INTERNAL MEDICINE

## 2019-10-31 PROCEDURE — 99215 OFFICE O/P EST HI 40 MIN: CPT | Mod: S$GLB,,, | Performed by: INTERNAL MEDICINE

## 2019-10-31 PROCEDURE — 3074F SYST BP LT 130 MM HG: CPT | Mod: CPTII,S$GLB,, | Performed by: INTERNAL MEDICINE

## 2019-10-31 PROCEDURE — 3079F DIAST BP 80-89 MM HG: CPT | Mod: CPTII,S$GLB,, | Performed by: INTERNAL MEDICINE

## 2019-10-31 PROCEDURE — 99999 PR PBB SHADOW E&M-EST. PATIENT-LVL IV: ICD-10-PCS | Mod: PBBFAC,,, | Performed by: INTERNAL MEDICINE

## 2019-10-31 PROCEDURE — 3079F PR MOST RECENT DIASTOLIC BLOOD PRESSURE 80-89 MM HG: ICD-10-PCS | Mod: CPTII,S$GLB,, | Performed by: INTERNAL MEDICINE

## 2019-10-31 RX ORDER — HYDROCHLOROTHIAZIDE 12.5 MG/1
12.5 TABLET ORAL DAILY
Qty: 90 TABLET | Refills: 1 | Status: SHIPPED | OUTPATIENT
Start: 2019-10-31 | End: 2020-02-03 | Stop reason: SDUPTHER

## 2019-10-31 RX ORDER — AMLODIPINE BESYLATE 10 MG/1
10 TABLET ORAL DAILY
Qty: 90 TABLET | Refills: 1 | Status: SHIPPED | OUTPATIENT
Start: 2019-10-31 | End: 2020-02-03 | Stop reason: SDUPTHER

## 2019-10-31 RX ORDER — METHYLPREDNISOLONE 4 MG/1
TABLET ORAL
Qty: 1 PACKAGE | Refills: 0 | Status: SHIPPED | OUTPATIENT
Start: 2019-10-31 | End: 2020-02-03

## 2019-10-31 NOTE — PROGRESS NOTES
Subjective:       Patient ID: Kristy Wallisch is a 63 y.o. female who  has a past medical history of Benign essential tremor, Cataract, HTN (hypertension), Leg laceration, right, subsequent encounter (2/20/2019), Posterior vitreous detachment of right eye, and Psoriasis.    Chief Complaint: Sciatica (left leg; worse in morning, sometimes wakes her up at night)    History was obtained from the patient and supplemented through chart review.  The patient was seen in Urgent Care 10/21/2019 for left-sided sciatica.    Works in  for [employer].  Has a dog.    Hip Pain      h/o R buttock pain, radiating to the anterior aspect of the right thigh and knee.  Usually resolves with stretching.  For the past 2-3 weeks, has had very similar pain, but on the left side and has not resolved with stretching.  Constant.  Worse in the morning, but also wakes her up in the evening.  Currently 4-10 in severity.  No heavy lifting, straining, back injury.  No paresthesia, incontinence, back pain.  Worse with standing still and with certain seated positions.  Is mainly seated at work and walks around.  Also has pain with climbing steps.  No relief with aspirin, icy Hot.  Urgent care prescribed Flexeril, which she has been taking 2-3 times a day, naproxen with some relief, but still feels tight.  No sedation.  Symptoms are very slowly improving.  Is concerned because she has a flight next week.    HTN:   Is compliant with Norvasc 10, HCTZ 12.5 d/t leg edema.  Tolerating meds well. Pt denies CP, SOB, lightheadedness, dizziness, leg edema.    Does not have a smart phone, so could not participate with digital hypertension.  Does not have blood pressure cuff at home.  +tobacco.    Exercise:  Enjoys being outdoors and working in her garden.  Exercises through walking daily around the neighborhood.  Takes the bus home.      Diet:  Cooks at home; no longer eating frozen dinners.  Her  is growing lettuce in the  vegetable garden.       Tobacco use:   She smokes 0.5 ppd.  Started smoking 19 yoa.  22.5 pack year history.  Has never tried to quit before.  Sometimes smokes due to stress at work.  Is currently not interested in tobacco cessation classes.    UTD on PPSV 23 .  Will need PCV13 in 3/2020.  Five years after the 1st dose of PPSV23, will need second dose of PPSV23            Not addressed today.  HLD:  Is currently taking ASA 81, Lipitor 40 daily.    Continue aspirin 81, taking Lipitor 40 due to elevated ASCVD.  Lab Results   Component Value Date    LDLCALC 128.4 03/07/2019     The 10-year ASCVD risk score (Saul JANE Jr., et al., 2013) is: 13%    Values used to calculate the score:      Age: 63 years      Sex: Female      Is Non- : No      Diabetic: No      Tobacco smoker: Yes      Systolic Blood Pressure: 128 mmHg      Is BP treated: Yes      HDL Cholesterol: 47 mg/dL      Total Cholesterol: 190 mg/dL    Essential tremor: not on BB.  +FHx.  Does not impair handwriting.  Not bothersome.  Has not been on BB, but symptoms are not bothersome    Psoriasis: flares with stress.  No active issues.    Review of Systems   Constitutional: Negative for activity change, fever and unexpected weight change.   HENT: Negative for hearing loss, postnasal drip, rhinorrhea and trouble swallowing.    Eyes: Negative for discharge and visual disturbance.   Respiratory: Negative for cough, chest tightness and wheezing.    Cardiovascular: Negative for chest pain, palpitations and leg swelling.   Gastrointestinal: Negative for blood in stool, constipation, diarrhea and vomiting.   Endocrine: Negative for polydipsia and polyuria.   Genitourinary: Negative for difficulty urinating, dysuria, hematuria and menstrual problem.   Musculoskeletal: Positive for arthralgias. Negative for back pain, joint swelling and neck pain.   Skin: Negative for rash and wound.   Neurological: Negative for weakness and headaches.  "  Hematological: Negative for adenopathy.   Psychiatric/Behavioral: Negative for confusion and dysphoric mood.       I personally reviewed Past Medical History, Past Surgical History, Social History, and Family History.    Objective:      Vitals:    10/31/19 1518   BP: 128/84   BP Location: Left arm   Patient Position: Sitting   Pulse: 94   SpO2: 98%   Weight: 72.4 kg (159 lb 9.8 oz)   Height: 5' 4" (1.626 m)      Physical Exam   Constitutional: She appears well-developed and well-nourished. No distress.   HENT:   Head: Normocephalic and atraumatic.   Nose: Nose normal. No mucosal edema.   Mouth/Throat: No oropharyngeal exudate, posterior oropharyngeal edema or posterior oropharyngeal erythema.   Eyes: Pupils are equal, round, and reactive to light. Conjunctivae and EOM are normal. Right eye exhibits no discharge. Left eye exhibits no discharge. No scleral icterus.   Neck: Neck supple. No tracheal deviation present. No thyromegaly present.   Cardiovascular: Normal rate, regular rhythm, normal heart sounds and intact distal pulses.   No murmur heard.  Pulmonary/Chest: Effort normal and breath sounds normal. No respiratory distress. She has no wheezes.   Abdominal: Soft. Bowel sounds are normal. There is no tenderness.   Musculoskeletal: She exhibits no edema, tenderness or deformity.        Cervical back: She exhibits normal range of motion and no tenderness.        Thoracic back: She exhibits no tenderness.        Lumbar back: She exhibits normal range of motion and no tenderness.   Negative straight leg test.  Is able to walk on heels and tiptoes without difficulty.   Lymphadenopathy:     She has no cervical adenopathy.   Neurological: She is alert. No cranial nerve deficit. Gait normal.   Skin: Skin is warm and dry. Capillary refill takes less than 2 seconds. She is not diaphoretic. No erythema.   Psychiatric: She has a normal mood and affect. Her behavior is normal.         Lab Results   Component Value Date    " WBC 5.48 03/07/2019    HGB 14.9 03/07/2019    HCT 44.8 03/07/2019     03/07/2019    CHOL 190 03/07/2019    TRIG 73 03/07/2019    HDL 47 03/07/2019    ALT 60 (H) 03/07/2019    AST 36 03/07/2019     03/07/2019    K 3.9 03/07/2019     03/07/2019    CREATININE 0.7 03/07/2019    BUN 12 03/07/2019    CO2 27 03/07/2019    TSH 1.98 09/09/2010    HGBA1C 5.6 03/07/2019       The 10-year ASCVD risk score (Saul JANE Jr., et al., 2013) is: 13%    Values used to calculate the score:      Age: 63 years      Sex: Female      Is Non- : No      Diabetic: No      Tobacco smoker: Yes      Systolic Blood Pressure: 128 mmHg      Is BP treated: Yes      HDL Cholesterol: 47 mg/dL      Total Cholesterol: 190 mg/dL    (Imaging have been independently reviewed)   Mammogram without evidence of malignancy, BI-RADS 1, TC score low.    Assessment:       1. Piriformis syndrome, left    2. Essential hypertension    3. Light cigarette smoker (1-9 cigs/day)    4. Cervical cancer screening    5. Colon cancer screening          Plan:       Shanae was seen today for sciatica.    Diagnoses and all orders for this visit:    Piriformis syndrome, left  Comments:  Persistent despite Flexeril, NSAID. Rx Medrol Dosepak. Ortho for possible steroid injection. Provided home exercises. Will make PT appt if persistent.  Orders:  -     methylPREDNISolone (MEDROL DOSEPACK) 4 mg tablet; use as directed  -     Ambulatory Referral to Physical/Occupational Therapy  -     Ambulatory referral/consult to Orthopedics; Future    Essential hypertension  Comments:  Now at goal. Cont Norvasc 10, HCTZ 12.5 due to leg edema.  Orders:  -     amLODIPine (NORVASC) 10 MG tablet; Take 1 tablet (10 mg total) by mouth once daily.  -     hydroCHLOROthiazide (HYDRODIURIL) 12.5 MG Tab; Take 1 tablet (12.5 mg total) by mouth once daily.    Light cigarette smoker (1-9 cigs/day)  Comments:  Counseled for 5 min on tobacco cessation.  22.5  pack-year history.  Not yet indicated for spiral CT.  PPSV 3/2019. Will need PCV13 in 3/2020.      Cervical cancer screening  Comments:  She will make appointment.    Colon cancer screening  Comments:  She prefers fit kit rather than C scope.  Has already been mailed.    Other orders  -     Cancel: Fecal Immunochemical Test (iFOBT); Future         Side effects of medication(s) were discussed in detail and patient voiced understanding.  Patient will call back for any issues or complications.     RTC in 3 month(s) or sooner PRN for HTN.

## 2019-11-05 DIAGNOSIS — G57.02 PIRIFORMIS SYNDROME, LEFT: ICD-10-CM

## 2019-11-05 RX ORDER — METHYLPREDNISOLONE 4 MG/1
TABLET ORAL
Qty: 1 PACKAGE | Refills: 0 | OUTPATIENT
Start: 2019-11-05

## 2019-11-06 RX ORDER — METHYLPREDNISOLONE 4 MG/1
TABLET ORAL
Qty: 1 PACKAGE | Refills: 0 | OUTPATIENT
Start: 2019-11-06

## 2020-02-03 ENCOUNTER — OFFICE VISIT (OUTPATIENT)
Dept: INTERNAL MEDICINE | Facility: CLINIC | Age: 65
End: 2020-02-03
Payer: COMMERCIAL

## 2020-02-03 VITALS
HEART RATE: 75 BPM | BODY MASS INDEX: 26.88 KG/M2 | SYSTOLIC BLOOD PRESSURE: 130 MMHG | DIASTOLIC BLOOD PRESSURE: 80 MMHG | WEIGHT: 157.44 LBS | HEIGHT: 64 IN

## 2020-02-03 DIAGNOSIS — E78.2 MIXED HYPERLIPIDEMIA: ICD-10-CM

## 2020-02-03 DIAGNOSIS — F17.210 LIGHT CIGARETTE SMOKER (1-9 CIGS/DAY): ICD-10-CM

## 2020-02-03 DIAGNOSIS — Z23 NEED FOR PNEUMOCOCCAL VACCINE: ICD-10-CM

## 2020-02-03 DIAGNOSIS — Z13.820 SCREENING FOR OSTEOPOROSIS: ICD-10-CM

## 2020-02-03 DIAGNOSIS — I10 ESSENTIAL HYPERTENSION: ICD-10-CM

## 2020-02-03 DIAGNOSIS — Z12.11 COLON CANCER SCREENING: ICD-10-CM

## 2020-02-03 DIAGNOSIS — Z11.4 SCREENING FOR HIV (HUMAN IMMUNODEFICIENCY VIRUS): ICD-10-CM

## 2020-02-03 DIAGNOSIS — G57.02 PIRIFORMIS SYNDROME, LEFT: Primary | ICD-10-CM

## 2020-02-03 PROCEDURE — 3075F SYST BP GE 130 - 139MM HG: CPT | Mod: CPTII,S$GLB,, | Performed by: INTERNAL MEDICINE

## 2020-02-03 PROCEDURE — 1101F PR PT FALLS ASSESS DOC 0-1 FALLS W/OUT INJ PAST YR: ICD-10-PCS | Mod: CPTII,S$GLB,, | Performed by: INTERNAL MEDICINE

## 2020-02-03 PROCEDURE — 99406 BEHAV CHNG SMOKING 3-10 MIN: CPT | Mod: S$GLB,,, | Performed by: INTERNAL MEDICINE

## 2020-02-03 PROCEDURE — 99999 PR PBB SHADOW E&M-EST. PATIENT-LVL III: CPT | Mod: PBBFAC,,, | Performed by: INTERNAL MEDICINE

## 2020-02-03 PROCEDURE — 3079F DIAST BP 80-89 MM HG: CPT | Mod: CPTII,S$GLB,, | Performed by: INTERNAL MEDICINE

## 2020-02-03 PROCEDURE — 3075F PR MOST RECENT SYSTOLIC BLOOD PRESS GE 130-139MM HG: ICD-10-PCS | Mod: CPTII,S$GLB,, | Performed by: INTERNAL MEDICINE

## 2020-02-03 PROCEDURE — 99999 PR PBB SHADOW E&M-EST. PATIENT-LVL III: ICD-10-PCS | Mod: PBBFAC,,, | Performed by: INTERNAL MEDICINE

## 2020-02-03 PROCEDURE — 99406 PR TOBACCO USE CESSATION INTERMEDIATE 3-10 MINUTES: ICD-10-PCS | Mod: S$GLB,,, | Performed by: INTERNAL MEDICINE

## 2020-02-03 PROCEDURE — 99215 OFFICE O/P EST HI 40 MIN: CPT | Mod: 25,S$GLB,, | Performed by: INTERNAL MEDICINE

## 2020-02-03 PROCEDURE — 3008F BODY MASS INDEX DOCD: CPT | Mod: CPTII,S$GLB,, | Performed by: INTERNAL MEDICINE

## 2020-02-03 PROCEDURE — 99215 PR OFFICE/OUTPT VISIT, EST, LEVL V, 40-54 MIN: ICD-10-PCS | Mod: 25,S$GLB,, | Performed by: INTERNAL MEDICINE

## 2020-02-03 PROCEDURE — 3008F PR BODY MASS INDEX (BMI) DOCUMENTED: ICD-10-PCS | Mod: CPTII,S$GLB,, | Performed by: INTERNAL MEDICINE

## 2020-02-03 PROCEDURE — 3079F PR MOST RECENT DIASTOLIC BLOOD PRESSURE 80-89 MM HG: ICD-10-PCS | Mod: CPTII,S$GLB,, | Performed by: INTERNAL MEDICINE

## 2020-02-03 PROCEDURE — 1101F PT FALLS ASSESS-DOCD LE1/YR: CPT | Mod: CPTII,S$GLB,, | Performed by: INTERNAL MEDICINE

## 2020-02-03 RX ORDER — ATORVASTATIN CALCIUM 40 MG/1
40 TABLET, FILM COATED ORAL DAILY
Qty: 90 TABLET | Refills: 3 | Status: SHIPPED | OUTPATIENT
Start: 2020-02-03 | End: 2021-03-02

## 2020-02-03 RX ORDER — HYDROCHLOROTHIAZIDE 12.5 MG/1
12.5 TABLET ORAL DAILY
Qty: 90 TABLET | Refills: 1 | Status: SHIPPED | OUTPATIENT
Start: 2020-02-03 | End: 2020-06-24

## 2020-02-03 RX ORDER — AMLODIPINE BESYLATE 10 MG/1
10 TABLET ORAL DAILY
Qty: 90 TABLET | Refills: 1 | Status: SHIPPED | OUTPATIENT
Start: 2020-02-03 | End: 2020-08-03 | Stop reason: SDUPTHER

## 2020-02-03 NOTE — PROGRESS NOTES
Subjective:       Patient ID: Kristy Wallisch is a 64 y.o. female who  has a past medical history of Benign essential tremor, Cataract, HTN (hypertension), Leg laceration, right, subsequent encounter (2/20/2019), Posterior vitreous detachment of right eye, and Psoriasis.    Chief Complaint: Hypertension and Follow-up    History was obtained from the patient and supplemented through chart review.  There were no ER or clinic visits since our last appointment.    Works in  for [employer].  Has a dog.    Hypertension   This is a chronic problem. The current episode started more than 1 month ago. The problem has been gradually improving since onset. The problem is controlled. Pertinent negatives include no anxiety, blurred vision, chest pain, headaches, malaise/fatigue, neck pain, orthopnea, palpitations, peripheral edema, PND, shortness of breath or sweats. Agents associated with hypertension include NSAIDs. Risk factors for coronary artery disease include family history, post-menopausal state and smoking/tobacco exposure. Past treatments include lifestyle changes. The current treatment provides significant improvement. There are no compliance problems.    Follow-up   Associated symptoms include arthralgias. Pertinent negatives include no chest pain, coughing, fever, headaches, joint swelling, neck pain, rash, vomiting or weakness.     L-> R hip pain:  h/o R buttock pain, radiating to the anterior aspect of the right thigh and knee.  Usually resolves with stretching.  Pain then progressed to the left side and has not resolved with stretching.  Worse in the morning, but also wakes her up in the evening.  No heavy lifting, straining, back injury.  No paresthesia, incontinence, back pain.  Worse with standing still and with certain seated positions.  Is mainly seated at work and walks around.  Also has pain with climbing steps.  No relief with aspirin, icy Hot.  Urgent care prescribed Flexeril  and naproxen with some relief, but still feels tight.      Rx Medrol Dosepak with improvement, but still with intermittent twinges of pain when climbing stairs.  Provided home exercises, but caused increased pain. Pain is mild.    HTN:   Is compliant with Norvasc 10, HCTZ 12.5 d/t leg edema.  Tolerating meds well. Pt denies CP, SOB, lightheadedness, dizziness, leg edema.    Does not have a smart phone, so could not participate with digital hypertension.  Does not have blood pressure cuff at home.  +tobacco.    Exercise:  Enjoys being outdoors and working in her garden.  Exercises through walking daily around the neighborhood.  Takes the bus home.      Diet:  Cooks at home; no longer eating frozen dinners.  Her  is growing lettuce in the vegetable garden.       Tobacco use:   She smokes 0.5 ppd.  Started smoking 19 yoa.  22.5 pack year history.  Has never tried to quit before.  Sometimes smokes due to stress at work.  Is currently not interested in tobacco cessation classes.    UTD on PPSV 23 .  Will need PCV13 in 3/2020.  Five years after the 1st dose of PPSV23, will need second dose of PPSV23     HLD:  Is currently taking ASA 81, Lipitor 40 daily.    Lab Results   Component Value Date    LDLCALC 128.4 03/07/2019     The 10-year ASCVD risk score ([place] JANE Jr., et al., 2013) is: 14.4%    Values used to calculate the score:      Age: 64 years      Sex: Female      Is Non- : No      Diabetic: No      Tobacco smoker: Yes      Systolic Blood Pressure: 130 mmHg      Is BP treated: Yes      HDL Cholesterol: 47 mg/dL      Total Cholesterol: 190 mg/dL            Not addressed today.  Essential tremor: not on BB.  +FHx.  Does not impair handwriting.  Not bothersome.  Has not been on BB, but symptoms are not bothersome    Psoriasis: flares with stress.  No active issues.    Review of Systems   Constitutional: Negative for activity change, fever, malaise/fatigue and unexpected weight change.  "  HENT: Negative for hearing loss, postnasal drip, rhinorrhea and trouble swallowing.    Eyes: Negative for blurred vision, discharge and visual disturbance.   Respiratory: Negative for cough, chest tightness, shortness of breath and wheezing.    Cardiovascular: Negative for chest pain, palpitations, orthopnea, leg swelling and PND.   Gastrointestinal: Negative for blood in stool, constipation, diarrhea and vomiting.   Endocrine: Negative for polydipsia and polyuria.   Genitourinary: Negative for difficulty urinating, dysuria, hematuria and menstrual problem.   Musculoskeletal: Positive for arthralgias. Negative for back pain, joint swelling and neck pain.   Skin: Negative for rash and wound.   Neurological: Negative for weakness and headaches.   Hematological: Negative for adenopathy.   Psychiatric/Behavioral: Negative for confusion and dysphoric mood.       I personally reviewed Past Medical History, Past Surgical History, Social History, and Family History.    Objective:      Vitals:    02/03/20 1532   BP: 130/80   Pulse: 75   Weight: 71.4 kg (157 lb 6.5 oz)   Height: 5' 4" (1.626 m)      Physical Exam   Constitutional: She appears well-developed and well-nourished. No distress.   HENT:   Head: Normocephalic and atraumatic.   Nose: Nose normal. No mucosal edema.   Mouth/Throat: No oropharyngeal exudate, posterior oropharyngeal edema or posterior oropharyngeal erythema.   Eyes: Pupils are equal, round, and reactive to light. Conjunctivae and EOM are normal. Right eye exhibits no discharge. Left eye exhibits no discharge. No scleral icterus.   Neck: Neck supple. No tracheal deviation present. No thyromegaly present.   Cardiovascular: Normal rate, regular rhythm, normal heart sounds and intact distal pulses.   No murmur heard.  Pulmonary/Chest: Effort normal and breath sounds normal. No respiratory distress. She has no wheezes.   Abdominal: Soft. Bowel sounds are normal. There is no tenderness.   Musculoskeletal: " She exhibits edema. She exhibits no deformity.   +1 BLE edema   Lymphadenopathy:     She has no cervical adenopathy.   Neurological: She is alert. No cranial nerve deficit. Gait normal.   Skin: Skin is warm and dry. She is not diaphoretic. No erythema.   Psychiatric: She has a normal mood and affect. Her behavior is normal.         Lab Results   Component Value Date    WBC 5.48 03/07/2019    HGB 14.9 03/07/2019    HCT 44.8 03/07/2019     03/07/2019    CHOL 190 03/07/2019    TRIG 73 03/07/2019    HDL 47 03/07/2019    ALT 60 (H) 03/07/2019    AST 36 03/07/2019     03/07/2019    K 3.9 03/07/2019     03/07/2019    CREATININE 0.7 03/07/2019    BUN 12 03/07/2019    CO2 27 03/07/2019    TSH 1.98 09/09/2010    HGBA1C 5.6 03/07/2019       The 10-year ASCVD risk score ([place] JANE Jr., et al., 2013) is: 14.4%    Values used to calculate the score:      Age: 64 years      Sex: Female      Is Non- : No      Diabetic: No      Tobacco smoker: Yes      Systolic Blood Pressure: 130 mmHg      Is BP treated: Yes      HDL Cholesterol: 47 mg/dL      Total Cholesterol: 190 mg/dL    (Imaging have been independently reviewed)   Mammogram without evidence of malignancy, BI-RADS 1, TC score low.    Assessment:       1. Piriformis syndrome, left    2. Essential hypertension    3. Light cigarette smoker (1-9 cigs/day)    4. Mixed hyperlipidemia    5. Need for pneumococcal vaccine    6. Screening for HIV (human immunodeficiency virus)    7. Colon cancer screening    8. Screening for osteoporosis          Plan:       Shanae was seen today for hypertension and follow-up.    Diagnoses and all orders for this visit:    Piriformis syndrome, left  Comments:  Improved, but persistent. Rec trial of home exercises. Has referral to Ortho for possible steroid injection and PT if persistent.    Essential hypertension  Comments:  Now at goal. Cont Norvasc 10, HCTZ 12.5 due to leg edema. Check annual CMP in 6  months.  Orders:  -     hydroCHLOROthiazide (HYDRODIURIL) 12.5 MG Tab; Take 1 tablet (12.5 mg total) by mouth once daily.  -     amLODIPine (NORVASC) 10 MG tablet; Take 1 tablet (10 mg total) by mouth once daily.  -     Comprehensive metabolic panel; Future    Light cigarette smoker (1-9 cigs/day)  Comments:  Counseled for 5 min on tobacco cessation.  22.5 pack-year history.  Not yet indicated for spiral CT.  PPSV 3/2019. Will need PCV13 in 3/2020.     Mixed hyperlipidemia  Comments:  Continue aspirin 81, taking Lipitor 40 due to elevated ASCVD.  Check annual FLP in 6 months.  Orders:  -     atorvastatin (LIPITOR) 40 MG tablet; Take 1 tablet (40 mg total) by mouth once daily.  -     Lipid panel; Future    Need for pneumococcal vaccine  Comments:  Will need PCV13 in 3/2020.  Advised to obtain vaccine at Pharmacy.    Screening for HIV (human immunodeficiency virus)  -     HIV 1/2 Ag/Ab (4th Gen); Future    Colon cancer screening  Comments:  Remind to complete fit kit.    Screening for osteoporosis  -     DXA Bone Density Spine And Hip; Future         Side effects of medication(s) were discussed in detail and patient voiced understanding.  Patient will call back for any issues or complications.     RTC in 6 month(s) or sooner PRN for HTN with labs prior.

## 2020-02-12 ENCOUNTER — TELEPHONE (OUTPATIENT)
Dept: INTERNAL MEDICINE | Facility: CLINIC | Age: 65
End: 2020-02-12

## 2020-02-27 ENCOUNTER — HOSPITAL ENCOUNTER (OUTPATIENT)
Dept: RADIOLOGY | Facility: OTHER | Age: 65
Discharge: HOME | End: 2020-02-27
Attending: INTERNAL MEDICINE
Payer: COMMERCIAL

## 2020-02-27 DIAGNOSIS — M85.80 OSTEOPENIA, UNSPECIFIED LOCATION: Primary | ICD-10-CM

## 2020-02-27 DIAGNOSIS — Z13.820 SCREENING FOR OSTEOPOROSIS: ICD-10-CM

## 2020-02-27 PROCEDURE — 77080 DEXA BONE DENSITY SPINE HIP: ICD-10-PCS | Mod: 26,,, | Performed by: RADIOLOGY

## 2020-02-27 PROCEDURE — 77080 DXA BONE DENSITY AXIAL: CPT | Mod: TC

## 2020-02-27 PROCEDURE — 77080 DXA BONE DENSITY AXIAL: CPT | Mod: 26,,, | Performed by: RADIOLOGY

## 2020-07-10 DIAGNOSIS — Z12.11 COLON CANCER SCREENING: ICD-10-CM

## 2020-07-14 ENCOUNTER — LAB VISIT (OUTPATIENT)
Dept: LAB | Facility: HOSPITAL | Age: 65
End: 2020-07-14
Attending: INTERNAL MEDICINE
Payer: COMMERCIAL

## 2020-07-14 DIAGNOSIS — Z12.11 COLON CANCER SCREENING: ICD-10-CM

## 2020-07-14 PROCEDURE — 82274 ASSAY TEST FOR BLOOD FECAL: CPT

## 2020-07-20 ENCOUNTER — PATIENT OUTREACH (OUTPATIENT)
Dept: ADMINISTRATIVE | Facility: HOSPITAL | Age: 65
End: 2020-07-20

## 2020-07-20 DIAGNOSIS — Z12.12 SCREENING FOR COLORECTAL CANCER: Primary | ICD-10-CM

## 2020-07-20 DIAGNOSIS — Z12.11 SCREENING FOR COLORECTAL CANCER: Primary | ICD-10-CM

## 2020-07-21 ENCOUNTER — LAB VISIT (OUTPATIENT)
Dept: LAB | Facility: OTHER | Age: 65
End: 2020-07-21
Attending: INTERNAL MEDICINE
Payer: COMMERCIAL

## 2020-07-21 DIAGNOSIS — M85.80 OSTEOPENIA, UNSPECIFIED LOCATION: ICD-10-CM

## 2020-07-21 DIAGNOSIS — Z11.4 SCREENING FOR HIV (HUMAN IMMUNODEFICIENCY VIRUS): ICD-10-CM

## 2020-07-21 DIAGNOSIS — E78.2 MIXED HYPERLIPIDEMIA: ICD-10-CM

## 2020-07-21 DIAGNOSIS — I10 ESSENTIAL HYPERTENSION: ICD-10-CM

## 2020-07-21 LAB
25(OH)D3+25(OH)D2 SERPL-MCNC: 43 NG/ML (ref 30–96)
ALBUMIN SERPL BCP-MCNC: 3.9 G/DL (ref 3.5–5.2)
ALP SERPL-CCNC: 98 U/L (ref 55–135)
ALT SERPL W/O P-5'-P-CCNC: 20 U/L (ref 10–44)
ANION GAP SERPL CALC-SCNC: 9 MMOL/L (ref 8–16)
AST SERPL-CCNC: 22 U/L (ref 10–40)
BILIRUB SERPL-MCNC: 0.6 MG/DL (ref 0.1–1)
BUN SERPL-MCNC: 14 MG/DL (ref 8–23)
CALCIUM SERPL-MCNC: 9 MG/DL (ref 8.7–10.5)
CHLORIDE SERPL-SCNC: 107 MMOL/L (ref 95–110)
CHOLEST SERPL-MCNC: 143 MG/DL (ref 120–199)
CHOLEST/HDLC SERPL: 2.6 {RATIO} (ref 2–5)
CO2 SERPL-SCNC: 24 MMOL/L (ref 23–29)
CREAT SERPL-MCNC: 0.7 MG/DL (ref 0.5–1.4)
EST. GFR  (AFRICAN AMERICAN): >60 ML/MIN/1.73 M^2
EST. GFR  (NON AFRICAN AMERICAN): >60 ML/MIN/1.73 M^2
GLUCOSE SERPL-MCNC: 98 MG/DL (ref 70–110)
HDLC SERPL-MCNC: 56 MG/DL (ref 40–75)
HDLC SERPL: 39.2 % (ref 20–50)
LDLC SERPL CALC-MCNC: 77.2 MG/DL (ref 63–159)
NONHDLC SERPL-MCNC: 87 MG/DL
POTASSIUM SERPL-SCNC: 3.6 MMOL/L (ref 3.5–5.1)
PROT SERPL-MCNC: 6.3 G/DL (ref 6–8.4)
SODIUM SERPL-SCNC: 140 MMOL/L (ref 136–145)
TRIGL SERPL-MCNC: 49 MG/DL (ref 30–150)

## 2020-07-21 PROCEDURE — 36415 COLL VENOUS BLD VENIPUNCTURE: CPT

## 2020-07-21 PROCEDURE — 86703 HIV-1/HIV-2 1 RESULT ANTBDY: CPT

## 2020-07-21 PROCEDURE — 82306 VITAMIN D 25 HYDROXY: CPT

## 2020-07-21 PROCEDURE — 80053 COMPREHEN METABOLIC PANEL: CPT

## 2020-07-21 PROCEDURE — 80061 LIPID PANEL: CPT

## 2020-07-22 LAB — HIV 1+2 AB+HIV1 P24 AG SERPL QL IA: NEGATIVE

## 2020-07-23 LAB — HEMOCCULT STL QL IA: NEGATIVE

## 2020-08-03 ENCOUNTER — OFFICE VISIT (OUTPATIENT)
Dept: INTERNAL MEDICINE | Facility: CLINIC | Age: 65
End: 2020-08-03
Payer: COMMERCIAL

## 2020-08-03 VITALS
HEART RATE: 91 BPM | HEIGHT: 64 IN | BODY MASS INDEX: 26.95 KG/M2 | DIASTOLIC BLOOD PRESSURE: 68 MMHG | SYSTOLIC BLOOD PRESSURE: 132 MMHG | WEIGHT: 157.88 LBS | OXYGEN SATURATION: 98 %

## 2020-08-03 DIAGNOSIS — E78.2 MIXED HYPERLIPIDEMIA: ICD-10-CM

## 2020-08-03 DIAGNOSIS — I10 ESSENTIAL HYPERTENSION: ICD-10-CM

## 2020-08-03 DIAGNOSIS — L81.4 SOLAR LENTIGO: ICD-10-CM

## 2020-08-03 DIAGNOSIS — Z23 NEED FOR PNEUMOCOCCAL VACCINE: ICD-10-CM

## 2020-08-03 DIAGNOSIS — Z12.39 BREAST CANCER SCREENING: ICD-10-CM

## 2020-08-03 DIAGNOSIS — Z00.00 ANNUAL PHYSICAL EXAM: Primary | ICD-10-CM

## 2020-08-03 DIAGNOSIS — M85.80 OSTEOPENIA, UNSPECIFIED LOCATION: ICD-10-CM

## 2020-08-03 DIAGNOSIS — F17.210 LIGHT CIGARETTE SMOKER (1-9 CIGS/DAY): ICD-10-CM

## 2020-08-03 PROBLEM — G57.02 PIRIFORMIS SYNDROME, LEFT: Status: RESOLVED | Noted: 2019-10-31 | Resolved: 2020-08-03

## 2020-08-03 PROCEDURE — 99406 PR TOBACCO USE CESSATION INTERMEDIATE 3-10 MINUTES: ICD-10-PCS | Mod: S$GLB,,, | Performed by: INTERNAL MEDICINE

## 2020-08-03 PROCEDURE — 99397 PER PM REEVAL EST PAT 65+ YR: CPT | Mod: 25,S$GLB,, | Performed by: INTERNAL MEDICINE

## 2020-08-03 PROCEDURE — 3078F PR MOST RECENT DIASTOLIC BLOOD PRESSURE < 80 MM HG: ICD-10-PCS | Mod: CPTII,S$GLB,, | Performed by: INTERNAL MEDICINE

## 2020-08-03 PROCEDURE — 99406 BEHAV CHNG SMOKING 3-10 MIN: CPT | Mod: S$GLB,,, | Performed by: INTERNAL MEDICINE

## 2020-08-03 PROCEDURE — 99999 PR PBB SHADOW E&M-EST. PATIENT-LVL IV: CPT | Mod: PBBFAC,,, | Performed by: INTERNAL MEDICINE

## 2020-08-03 PROCEDURE — 3075F PR MOST RECENT SYSTOLIC BLOOD PRESS GE 130-139MM HG: ICD-10-PCS | Mod: CPTII,S$GLB,, | Performed by: INTERNAL MEDICINE

## 2020-08-03 PROCEDURE — 99999 PR PBB SHADOW E&M-EST. PATIENT-LVL IV: ICD-10-PCS | Mod: PBBFAC,,, | Performed by: INTERNAL MEDICINE

## 2020-08-03 PROCEDURE — 3078F DIAST BP <80 MM HG: CPT | Mod: CPTII,S$GLB,, | Performed by: INTERNAL MEDICINE

## 2020-08-03 PROCEDURE — 99397 PR PREVENTIVE VISIT,EST,65 & OVER: ICD-10-PCS | Mod: 25,S$GLB,, | Performed by: INTERNAL MEDICINE

## 2020-08-03 PROCEDURE — 3075F SYST BP GE 130 - 139MM HG: CPT | Mod: CPTII,S$GLB,, | Performed by: INTERNAL MEDICINE

## 2020-08-03 RX ORDER — AMLODIPINE BESYLATE 10 MG/1
10 TABLET ORAL DAILY
Qty: 90 TABLET | Refills: 3 | Status: SHIPPED | OUTPATIENT
Start: 2020-08-03 | End: 2021-09-15

## 2020-08-03 RX ORDER — LANOLIN ALCOHOL/MO/W.PET/CERES
1 CREAM (GRAM) TOPICAL 2 TIMES DAILY
COMMUNITY

## 2020-08-03 NOTE — PROGRESS NOTES
Subjective:       Patient ID: Kristy Wallisch is a 64 y.o. female who  has a past medical history of Benign essential tremor, Cataract, HTN (hypertension), Leg laceration, right, subsequent encounter (2/20/2019), Posterior vitreous detachment of right eye, and Psoriasis.    Chief Complaint: Follow-up and Hypertension    History was obtained from the patient and supplemented through chart review.  There were no ER or clinic visits since our last appointment.    Just retired in  for [employer].  Has a dog.    HPI     HTN:   On Norvasc 10, HCTZ 12.5 d/t leg edema.  Tolerating meds well. Pt denies CP, SOB, lightheadedness, dizziness, leg edema.    Does not have a smart phone, so could not participate with digital hypertension.  Does not have blood pressure cuff at home.  +tobacco.    Exercise:  Enjoys being outdoors and working in her garden.  Exercises through walking daily around the neighborhood.  Retired now.  Her balance is better when she exercises.    Diet:  Cooks at home; no longer eating frozen dinners.  Her  is growing lettuce in the vegetable garden.  Has increased salt for lunch.    Tobacco use:   She still smokes 0.5 ppd.  Started smoking 19 yoa.  23 pack year history.  Has never tried to quit before.  Sometimes smokes due to stress at work.  Is currently not interested in tobacco cessation classes.      HLD:  Is currently taking ASA 81, Lipitor 40 daily.    Lab Results   Component Value Date    LDLCALC 77.2 07/21/2020     The 10-year ASCVD risk score (Saul JANE Jr., et al., 2013) is: 12%    Values used to calculate the score:      Age: 64 years      Sex: Female      Is Non- : No      Diabetic: No      Tobacco smoker: Yes      Systolic Blood Pressure: 132 mmHg      Is BP treated: Yes      HDL Cholesterol: 56 mg/dL      Total Cholesterol: 143 mg/dL    Osteopenia:    DXA .  Is taking Ca/Vit D supplement.  + tobacco.    Exercise: as above.  Lab  "Results   Component Value Date    [id number] 43 07/21/2020     Solar lentigo:   Applies sunscreen.  No FHx skin cancer.  H/o being easily sunburned as a child.               Not addressed today.  Essential tremor: not on BB.  +FHx.  Does not impair handwriting.  Not bothersome.  Has not been on BB, but symptoms are not bothersome    Psoriasis: flares with stress.  No active issues.    Review of Systems   Constitutional: Negative for activity change and unexpected weight change.   HENT: Negative for hearing loss, rhinorrhea and trouble swallowing.    Eyes: Negative for discharge and visual disturbance.   Respiratory: Negative for chest tightness, shortness of breath and wheezing.    Cardiovascular: Negative for chest pain and leg swelling.   Gastrointestinal: Negative for blood in stool, constipation, diarrhea and vomiting.   Endocrine: Negative for polydipsia and polyuria.   Genitourinary: Negative for difficulty urinating, dysuria, hematuria and menstrual problem.   Musculoskeletal: Negative for arthralgias, joint swelling and neck pain.   Skin: Negative for rash and wound.   Neurological: Negative for weakness and headaches.   Hematological: Negative for adenopathy.   Psychiatric/Behavioral: Negative for confusion and dysphoric mood.       I personally reviewed Past Medical History, Past Surgical History, Social History, and Family History.    Objective:      Vitals:    08/03/20 1503 08/03/20 1522   BP: (!) 145/75 132/68   Pulse: 91    SpO2: 98%    Weight: 71.6 kg (157 lb 13.6 oz)    Height: 5' 4" (1.626 m)       Physical Exam  Constitutional:       General: She is not in acute distress.     Appearance: She is well-developed. She is not diaphoretic.   HENT:      Head: Normocephalic and atraumatic.      Nose: Nose normal. No mucosal edema.      Mouth/Throat:      Pharynx: No oropharyngeal exudate or posterior oropharyngeal erythema.   Eyes:      General: No scleral icterus.        Right eye: No discharge.         " Left eye: No discharge.      Conjunctiva/sclera: Conjunctivae normal.      Pupils: Pupils are equal, round, and reactive to light.   Neck:      Musculoskeletal: Neck supple.      Thyroid: No thyromegaly.      Trachea: No tracheal deviation.   Cardiovascular:      Rate and Rhythm: Normal rate and regular rhythm.      Heart sounds: Normal heart sounds. No murmur.   Pulmonary:      Effort: Pulmonary effort is normal. No respiratory distress.      Breath sounds: Normal breath sounds. No wheezing.   Abdominal:      General: Bowel sounds are normal.      Palpations: Abdomen is soft.      Tenderness: There is no abdominal tenderness.   Musculoskeletal:         General: No deformity.      Comments: Trace edema   Lymphadenopathy:      Cervical: No cervical adenopathy.   Skin:     General: Skin is warm and dry.      Findings: No erythema.      Comments: Solar lentigo   Neurological:      Mental Status: She is alert.      Cranial Nerves: No cranial nerve deficit.      Gait: Gait normal.   Psychiatric:         Behavior: Behavior normal.           Lab Results   Component Value Date    WBC 5.48 03/07/2019    HGB 14.9 03/07/2019    HCT 44.8 03/07/2019     03/07/2019    CHOL 143 07/21/2020    TRIG 49 07/21/2020    HDL 56 07/21/2020    ALT 20 07/21/2020    AST 22 07/21/2020     07/21/2020    K 3.6 07/21/2020     07/21/2020    CREATININE 0.7 07/21/2020    BUN 14 07/21/2020    CO2 24 07/21/2020    TSH 1.98 09/09/2010    HGBA1C 5.6 03/07/2019       The 10-year ASCVD risk score (Ringold BUCK Jr., et al., 2013) is: 12%    Values used to calculate the score:      Age: 64 years      Sex: Female      Is Non- : No      Diabetic: No      Tobacco smoker: Yes      Systolic Blood Pressure: 132 mmHg      Is BP treated: Yes      HDL Cholesterol: 56 mg/dL      Total Cholesterol: 143 mg/dL    (Imaging have been independently reviewed)  DEXA with osteopenia.      Assessment:       1. Annual physical exam    2.  Essential hypertension    3. Light cigarette smoker (1-9 cigs/day)    4. Need for pneumococcal vaccine    5. Mixed hyperlipidemia    6. Osteopenia, unspecified location    7. Solar lentigo    8. Breast cancer screening          Plan:       Shanae was seen today for follow-up and hypertension.    Diagnoses and all orders for this visit:    Annual physical exam    Essential hypertension  Comments:  At goal. Cont Norvasc 10, HCTZ 12.5 due to leg edema. CMP with normal K, renal function. Provided exercise videos.  Orders:  -     amLODIPine (NORVASC) 10 MG tablet; Take 1 tablet (10 mg total) by mouth once daily.    Light cigarette smoker (1-9 cigs/day)  Comments:  Persistent. Counseled for 5 min on tobacco cessation.  23 pack-year history.  Not yet indicated for spiral CT. PCV13 as below.    Need for pneumococcal vaccine  Comments:  PCV13. Advised to obtain vaccine at Pharmacy.    Mixed hyperlipidemia  Comments:  Controlled.  Continue aspirin 81, taking Lipitor 40 due to elevated ASCVD.     Osteopenia, unspecified location  Comments:  Vit D wnl. Cont Ca/Vit D supplement. Discussed tobacco cessation.  Encouraged exercise regularly.      Solar lentigo  Comments:  Discussed applying sunscreen, ABDCEs of skin lesions. She declined Derm for TBSE.    Breast cancer screening  -     Mammo Digital Screening Bilat w/ [facility]; Future         Side effects of medication(s) were discussed in detail and patient voiced understanding.  Patient will call back for any issues or complications.     RTC in 6 month(s) or sooner PRN for HTN.

## 2020-08-03 NOTE — PATIENT INSTRUCTIONS
Matt: Trudi, 7 Minute Workout, Keep Training  YouTube: Fitness , Sugary Six Pack, Blogilates, Yoga with [employer], [employer], the [employer] [place]

## 2020-08-04 ENCOUNTER — IMMUNIZATION (OUTPATIENT)
Dept: PHARMACY | Facility: CLINIC | Age: 65
End: 2020-08-04
Payer: COMMERCIAL

## 2020-08-18 ENCOUNTER — HOSPITAL ENCOUNTER (OUTPATIENT)
Dept: RADIOLOGY | Facility: OTHER | Age: 65
Discharge: HOME | End: 2020-08-18
Attending: INTERNAL MEDICINE
Payer: COMMERCIAL

## 2020-08-18 DIAGNOSIS — Z12.39 BREAST CANCER SCREENING: ICD-10-CM

## 2020-08-18 DIAGNOSIS — Z12.31 VISIT FOR SCREENING MAMMOGRAM: ICD-10-CM

## 2020-08-18 PROCEDURE — 77063 BREAST TOMOSYNTHESIS BI: CPT | Mod: 26,,, | Performed by: RADIOLOGY

## 2020-08-18 PROCEDURE — 77063 MAMMO DIGITAL SCREENING BILAT WITH TOMOSYNTHESIS_CAD: ICD-10-PCS | Mod: 26,,, | Performed by: RADIOLOGY

## 2020-08-18 PROCEDURE — 77067 SCR MAMMO BI INCL CAD: CPT | Mod: 26,,, | Performed by: RADIOLOGY

## 2020-08-18 PROCEDURE — 77067 SCR MAMMO BI INCL CAD: CPT | Mod: TC

## 2020-08-18 PROCEDURE — 77067 MAMMO DIGITAL SCREENING BILAT WITH TOMOSYNTHESIS_CAD: ICD-10-PCS | Mod: 26,,, | Performed by: RADIOLOGY

## 2021-03-03 ENCOUNTER — OFFICE VISIT (OUTPATIENT)
Dept: INTERNAL MEDICINE | Facility: CLINIC | Age: 66
End: 2021-03-03
Payer: COMMERCIAL

## 2021-03-03 VITALS
HEART RATE: 78 BPM | BODY MASS INDEX: 28.04 KG/M2 | WEIGHT: 164.25 LBS | HEIGHT: 64 IN | SYSTOLIC BLOOD PRESSURE: 143 MMHG | DIASTOLIC BLOOD PRESSURE: 66 MMHG | OXYGEN SATURATION: 97 %

## 2021-03-03 DIAGNOSIS — F17.210 LIGHT CIGARETTE SMOKER (1-9 CIGS/DAY): ICD-10-CM

## 2021-03-03 DIAGNOSIS — H61.21 RIGHT EAR IMPACTED CERUMEN: Primary | ICD-10-CM

## 2021-03-03 DIAGNOSIS — I10 ESSENTIAL HYPERTENSION: ICD-10-CM

## 2021-03-03 DIAGNOSIS — M85.80 OSTEOPENIA, UNSPECIFIED LOCATION: ICD-10-CM

## 2021-03-03 DIAGNOSIS — E78.2 MIXED HYPERLIPIDEMIA: ICD-10-CM

## 2021-03-03 PROCEDURE — 99406 PR TOBACCO USE CESSATION INTERMEDIATE 3-10 MINUTES: ICD-10-PCS | Mod: S$GLB,,, | Performed by: INTERNAL MEDICINE

## 2021-03-03 PROCEDURE — 99215 OFFICE O/P EST HI 40 MIN: CPT | Mod: 25,S$GLB,, | Performed by: INTERNAL MEDICINE

## 2021-03-03 PROCEDURE — 99999 PR PBB SHADOW E&M-EST. PATIENT-LVL IV: CPT | Mod: PBBFAC,,, | Performed by: INTERNAL MEDICINE

## 2021-03-03 PROCEDURE — 99406 BEHAV CHNG SMOKING 3-10 MIN: CPT | Mod: S$GLB,,, | Performed by: INTERNAL MEDICINE

## 2021-03-03 PROCEDURE — 99214 OFFICE O/P EST MOD 30 MIN: CPT | Mod: PBBFAC | Performed by: INTERNAL MEDICINE

## 2021-03-03 PROCEDURE — 99999 PR PBB SHADOW E&M-EST. PATIENT-LVL IV: ICD-10-PCS | Mod: PBBFAC,,, | Performed by: INTERNAL MEDICINE

## 2021-03-03 PROCEDURE — 99215 PR OFFICE/OUTPT VISIT, EST, LEVL V, 40-54 MIN: ICD-10-PCS | Mod: 25,S$GLB,, | Performed by: INTERNAL MEDICINE

## 2021-03-03 RX ORDER — HYDROCHLOROTHIAZIDE 12.5 MG/1
12.5 TABLET ORAL DAILY
Qty: 90 TABLET | Refills: 3 | Status: CANCELLED | OUTPATIENT
Start: 2021-03-03

## 2021-03-05 ENCOUNTER — CLINICAL SUPPORT (OUTPATIENT)
Dept: INTERNAL MEDICINE | Facility: CLINIC | Age: 66
End: 2021-03-05
Payer: MEDICARE

## 2021-03-05 ENCOUNTER — TELEPHONE (OUTPATIENT)
Dept: INTERNAL MEDICINE | Facility: CLINIC | Age: 66
End: 2021-03-05

## 2021-03-05 VITALS — SYSTOLIC BLOOD PRESSURE: 124 MMHG | HEART RATE: 76 BPM | DIASTOLIC BLOOD PRESSURE: 64 MMHG | OXYGEN SATURATION: 98 %

## 2021-03-05 PROCEDURE — 99212 OFFICE O/P EST SF 10 MIN: CPT | Mod: PBBFAC

## 2021-03-05 PROCEDURE — 99999 PR PBB SHADOW E&M-EST. PATIENT-LVL II: CPT | Mod: PBBFAC,,,

## 2021-03-05 PROCEDURE — 99999 PR PBB SHADOW E&M-EST. PATIENT-LVL II: ICD-10-PCS | Mod: PBBFAC,,,

## 2021-03-19 ENCOUNTER — PATIENT MESSAGE (OUTPATIENT)
Dept: INTERNAL MEDICINE | Facility: CLINIC | Age: 66
End: 2021-03-19

## 2021-03-22 ENCOUNTER — OFFICE VISIT (OUTPATIENT)
Dept: INTERNAL MEDICINE | Facility: CLINIC | Age: 66
End: 2021-03-22
Payer: MEDICARE

## 2021-03-22 ENCOUNTER — HOSPITAL ENCOUNTER (OUTPATIENT)
Dept: CARDIOLOGY | Facility: OTHER | Age: 66
Discharge: HOME | End: 2021-03-22
Attending: PHYSICIAN ASSISTANT
Payer: COMMERCIAL

## 2021-03-22 VITALS
HEIGHT: 64 IN | SYSTOLIC BLOOD PRESSURE: 122 MMHG | WEIGHT: 161.81 LBS | BODY MASS INDEX: 27.63 KG/M2 | HEART RATE: 77 BPM | DIASTOLIC BLOOD PRESSURE: 66 MMHG | OXYGEN SATURATION: 98 %

## 2021-03-22 DIAGNOSIS — R42 LIGHTHEADEDNESS: Primary | ICD-10-CM

## 2021-03-22 DIAGNOSIS — R42 LIGHTHEADEDNESS: ICD-10-CM

## 2021-03-22 PROCEDURE — 93005 ELECTROCARDIOGRAM TRACING: CPT

## 2021-03-22 PROCEDURE — 99213 PR OFFICE/OUTPT VISIT, EST, LEVL III, 20-29 MIN: ICD-10-PCS | Mod: S$PBB,,, | Performed by: PHYSICIAN ASSISTANT

## 2021-03-22 PROCEDURE — 99213 OFFICE O/P EST LOW 20 MIN: CPT | Mod: S$PBB,,, | Performed by: PHYSICIAN ASSISTANT

## 2021-03-22 PROCEDURE — 99999 PR PBB SHADOW E&M-EST. PATIENT-LVL III: CPT | Mod: PBBFAC,,, | Performed by: PHYSICIAN ASSISTANT

## 2021-03-22 PROCEDURE — 99213 OFFICE O/P EST LOW 20 MIN: CPT | Mod: PBBFAC,25 | Performed by: PHYSICIAN ASSISTANT

## 2021-03-22 PROCEDURE — 99999 PR PBB SHADOW E&M-EST. PATIENT-LVL III: ICD-10-PCS | Mod: PBBFAC,,, | Performed by: PHYSICIAN ASSISTANT

## 2021-03-22 PROCEDURE — 93010 EKG 12-LEAD: ICD-10-PCS | Mod: ,,, | Performed by: INTERNAL MEDICINE

## 2021-03-22 PROCEDURE — 93010 ELECTROCARDIOGRAM REPORT: CPT | Mod: ,,, | Performed by: INTERNAL MEDICINE

## 2021-03-22 RX ORDER — FLUTICASONE PROPIONATE 50 MCG
1 SPRAY, SUSPENSION (ML) NASAL DAILY
Qty: 11.1 ML | Refills: 0 | Status: SHIPPED | OUTPATIENT
Start: 2021-03-22 | End: 2021-05-17

## 2021-04-09 ENCOUNTER — PATIENT MESSAGE (OUTPATIENT)
Dept: INTERNAL MEDICINE | Facility: CLINIC | Age: 66
End: 2021-04-09

## 2021-05-02 ENCOUNTER — PATIENT MESSAGE (OUTPATIENT)
Dept: ADMINISTRATIVE | Facility: HOSPITAL | Age: 66
End: 2021-05-02

## 2021-07-13 ENCOUNTER — LAB VISIT (OUTPATIENT)
Dept: LAB | Facility: HOSPITAL | Age: 66
End: 2021-07-13
Attending: INTERNAL MEDICINE
Payer: MEDICARE

## 2021-07-13 DIAGNOSIS — Z12.11 ENCOUNTER FOR FIT (FECAL IMMUNOCHEMICAL TEST) SCREENING: ICD-10-CM

## 2021-07-13 PROCEDURE — 82274 ASSAY TEST FOR BLOOD FECAL: CPT | Performed by: INTERNAL MEDICINE

## 2021-07-20 ENCOUNTER — OFFICE VISIT (OUTPATIENT)
Dept: OTOLARYNGOLOGY | Facility: CLINIC | Age: 66
End: 2021-07-20
Payer: COMMERCIAL

## 2021-07-20 VITALS
SYSTOLIC BLOOD PRESSURE: 131 MMHG | WEIGHT: 163.38 LBS | HEIGHT: 65 IN | BODY MASS INDEX: 27.22 KG/M2 | HEART RATE: 82 BPM | DIASTOLIC BLOOD PRESSURE: 73 MMHG

## 2021-07-20 DIAGNOSIS — H61.21 IMPACTED CERUMEN OF RIGHT EAR: Primary | ICD-10-CM

## 2021-07-20 PROCEDURE — 69210 REMOVE IMPACTED EAR WAX UNI: CPT | Mod: PBBFAC | Performed by: NURSE PRACTITIONER

## 2021-07-20 PROCEDURE — 69210 EAR CERUMEN REMOVAL: ICD-10-PCS | Mod: S$GLB,,, | Performed by: NURSE PRACTITIONER

## 2021-07-20 PROCEDURE — 99999 PR PBB SHADOW E&M-EST. PATIENT-LVL III: ICD-10-PCS | Mod: PBBFAC,,, | Performed by: NURSE PRACTITIONER

## 2021-07-20 PROCEDURE — 69210 REMOVE IMPACTED EAR WAX UNI: CPT | Mod: S$GLB,,, | Performed by: NURSE PRACTITIONER

## 2021-07-20 PROCEDURE — 99999 PR PBB SHADOW E&M-EST. PATIENT-LVL III: CPT | Mod: PBBFAC,,, | Performed by: NURSE PRACTITIONER

## 2021-07-20 PROCEDURE — 99499 NO LOS: ICD-10-PCS | Mod: S$GLB,,, | Performed by: NURSE PRACTITIONER

## 2021-07-20 PROCEDURE — 99213 OFFICE O/P EST LOW 20 MIN: CPT | Mod: PBBFAC | Performed by: NURSE PRACTITIONER

## 2021-07-20 PROCEDURE — 99499 UNLISTED E&M SERVICE: CPT | Mod: S$GLB,,, | Performed by: NURSE PRACTITIONER

## 2021-07-22 DIAGNOSIS — Z12.11 ENCOUNTER FOR FIT (FECAL IMMUNOCHEMICAL TEST) SCREENING: Primary | ICD-10-CM

## 2021-07-27 LAB — HEMOCCULT STL QL IA: NEGATIVE

## 2021-08-20 ENCOUNTER — PATIENT OUTREACH (OUTPATIENT)
Dept: ADMINISTRATIVE | Facility: HOSPITAL | Age: 66
End: 2021-08-20

## 2021-08-20 DIAGNOSIS — Z12.31 ENCOUNTER FOR SCREENING MAMMOGRAM FOR BREAST CANCER: Primary | ICD-10-CM

## 2021-08-28 ENCOUNTER — PATIENT MESSAGE (OUTPATIENT)
Dept: INTERNAL MEDICINE | Facility: CLINIC | Age: 66
End: 2021-08-28

## 2021-08-28 ENCOUNTER — TELEPHONE (OUTPATIENT)
Dept: INTERNAL MEDICINE | Facility: CLINIC | Age: 66
End: 2021-08-28

## 2021-09-16 ENCOUNTER — HOSPITAL ENCOUNTER (OUTPATIENT)
Dept: RADIOLOGY | Facility: OTHER | Age: 66
Discharge: HOME | End: 2021-09-16
Attending: INTERNAL MEDICINE
Payer: COMMERCIAL

## 2021-09-16 ENCOUNTER — OFFICE VISIT (OUTPATIENT)
Dept: OPTOMETRY | Facility: CLINIC | Age: 66
End: 2021-09-16
Payer: COMMERCIAL

## 2021-09-16 DIAGNOSIS — Z12.31 ENCOUNTER FOR SCREENING MAMMOGRAM FOR BREAST CANCER: ICD-10-CM

## 2021-09-16 DIAGNOSIS — H52.203 HYPEROPIA WITH ASTIGMATISM AND PRESBYOPIA, BILATERAL: ICD-10-CM

## 2021-09-16 DIAGNOSIS — H52.03 HYPEROPIA WITH ASTIGMATISM AND PRESBYOPIA, BILATERAL: ICD-10-CM

## 2021-09-16 DIAGNOSIS — H52.4 HYPEROPIA WITH ASTIGMATISM AND PRESBYOPIA, BILATERAL: ICD-10-CM

## 2021-09-16 DIAGNOSIS — H25.13 SENILE NUCLEAR SCLEROSIS, BILATERAL: Primary | ICD-10-CM

## 2021-09-16 PROCEDURE — 77063 BREAST TOMOSYNTHESIS BI: CPT | Mod: 26,,, | Performed by: RADIOLOGY

## 2021-09-16 PROCEDURE — 92004 PR EYE EXAM, NEW PATIENT,COMPREHESV: ICD-10-PCS | Mod: S$GLB,,, | Performed by: OPTOMETRIST

## 2021-09-16 PROCEDURE — 77063 MAMMO DIGITAL SCREENING BILAT WITH TOMO: ICD-10-PCS | Mod: 26,,, | Performed by: RADIOLOGY

## 2021-09-16 PROCEDURE — 77067 SCR MAMMO BI INCL CAD: CPT | Mod: 26,,, | Performed by: RADIOLOGY

## 2021-09-16 PROCEDURE — 92004 COMPRE OPH EXAM NEW PT 1/>: CPT | Mod: S$GLB,,, | Performed by: OPTOMETRIST

## 2021-09-16 PROCEDURE — 99999 PR PBB SHADOW E&M-EST. PATIENT-LVL III: ICD-10-PCS | Mod: PBBFAC,,, | Performed by: OPTOMETRIST

## 2021-09-16 PROCEDURE — 3288F PR FALLS RISK ASSESSMENT DOCUMENTED: ICD-10-PCS | Mod: CPTII,S$GLB,, | Performed by: OPTOMETRIST

## 2021-09-16 PROCEDURE — 77067 SCR MAMMO BI INCL CAD: CPT | Mod: TC

## 2021-09-16 PROCEDURE — 1101F PT FALLS ASSESS-DOCD LE1/YR: CPT | Mod: CPTII,S$GLB,, | Performed by: OPTOMETRIST

## 2021-09-16 PROCEDURE — 77067 MAMMO DIGITAL SCREENING BILAT WITH TOMO: ICD-10-PCS | Mod: 26,,, | Performed by: RADIOLOGY

## 2021-09-16 PROCEDURE — 3288F FALL RISK ASSESSMENT DOCD: CPT | Mod: CPTII,S$GLB,, | Performed by: OPTOMETRIST

## 2021-09-16 PROCEDURE — 92015 DETERMINE REFRACTIVE STATE: CPT | Mod: S$GLB,,, | Performed by: OPTOMETRIST

## 2021-09-16 PROCEDURE — 92015 PR REFRACTION: ICD-10-PCS | Mod: S$GLB,,, | Performed by: OPTOMETRIST

## 2021-09-16 PROCEDURE — 1126F PR PAIN SEVERITY QUANTIFIED, NO PAIN PRESENT: ICD-10-PCS | Mod: CPTII,S$GLB,, | Performed by: OPTOMETRIST

## 2021-09-16 PROCEDURE — 1101F PR PT FALLS ASSESS DOC 0-1 FALLS W/OUT INJ PAST YR: ICD-10-PCS | Mod: CPTII,S$GLB,, | Performed by: OPTOMETRIST

## 2021-09-16 PROCEDURE — 1160F RVW MEDS BY RX/DR IN RCRD: CPT | Mod: CPTII,S$GLB,, | Performed by: OPTOMETRIST

## 2021-09-16 PROCEDURE — 1126F AMNT PAIN NOTED NONE PRSNT: CPT | Mod: CPTII,S$GLB,, | Performed by: OPTOMETRIST

## 2021-09-16 PROCEDURE — 1159F MED LIST DOCD IN RCRD: CPT | Mod: CPTII,S$GLB,, | Performed by: OPTOMETRIST

## 2021-09-16 PROCEDURE — 1159F PR MEDICATION LIST DOCUMENTED IN MEDICAL RECORD: ICD-10-PCS | Mod: CPTII,S$GLB,, | Performed by: OPTOMETRIST

## 2021-09-16 PROCEDURE — 1160F PR REVIEW ALL MEDS BY PRESCRIBER/CLIN PHARMACIST DOCUMENTED: ICD-10-PCS | Mod: CPTII,S$GLB,, | Performed by: OPTOMETRIST

## 2021-09-16 PROCEDURE — 99999 PR PBB SHADOW E&M-EST. PATIENT-LVL III: CPT | Mod: PBBFAC,,, | Performed by: OPTOMETRIST

## 2021-11-01 ENCOUNTER — LAB VISIT (OUTPATIENT)
Dept: LAB | Facility: OTHER | Age: 66
End: 2021-11-01
Attending: INTERNAL MEDICINE
Payer: MEDICARE

## 2021-11-01 DIAGNOSIS — I10 ESSENTIAL HYPERTENSION: ICD-10-CM

## 2021-11-01 DIAGNOSIS — M85.80 OSTEOPENIA, UNSPECIFIED LOCATION: ICD-10-CM

## 2021-11-01 DIAGNOSIS — E78.2 MIXED HYPERLIPIDEMIA: ICD-10-CM

## 2021-11-01 LAB
25(OH)D3+25(OH)D2 SERPL-MCNC: 48 NG/ML (ref 30–96)
ALBUMIN SERPL BCP-MCNC: 4 G/DL (ref 3.5–5.2)
ALP SERPL-CCNC: 99 U/L (ref 55–135)
ALT SERPL W/O P-5'-P-CCNC: 17 U/L (ref 10–44)
ANION GAP SERPL CALC-SCNC: 10 MMOL/L (ref 8–16)
AST SERPL-CCNC: 19 U/L (ref 10–40)
BASOPHILS # BLD AUTO: 0.07 K/UL (ref 0–0.2)
BASOPHILS NFR BLD: 0.9 % (ref 0–1.9)
BILIRUB SERPL-MCNC: 0.6 MG/DL (ref 0.1–1)
BUN SERPL-MCNC: 11 MG/DL (ref 8–23)
CALCIUM SERPL-MCNC: 9.6 MG/DL (ref 8.7–10.5)
CHLORIDE SERPL-SCNC: 107 MMOL/L (ref 95–110)
CHOLEST SERPL-MCNC: 139 MG/DL (ref 120–199)
CHOLEST/HDLC SERPL: 2.2 {RATIO} (ref 2–5)
CO2 SERPL-SCNC: 27 MMOL/L (ref 23–29)
CREAT SERPL-MCNC: 0.7 MG/DL (ref 0.5–1.4)
DIFFERENTIAL METHOD: ABNORMAL
EOSINOPHIL # BLD AUTO: 0.3 K/UL (ref 0–0.5)
EOSINOPHIL NFR BLD: 3.9 % (ref 0–8)
ERYTHROCYTE [DISTWIDTH] IN BLOOD BY AUTOMATED COUNT: 13.3 % (ref 11.5–14.5)
EST. GFR  (AFRICAN AMERICAN): >60 ML/MIN/1.73 M^2
EST. GFR  (NON AFRICAN AMERICAN): >60 ML/MIN/1.73 M^2
GLUCOSE SERPL-MCNC: 104 MG/DL (ref 70–110)
HCT VFR BLD AUTO: 47.8 % (ref 37–48.5)
HDLC SERPL-MCNC: 62 MG/DL (ref 40–75)
HDLC SERPL: 44.6 % (ref 20–50)
HGB BLD-MCNC: 15.9 G/DL (ref 12–16)
IMM GRANULOCYTES # BLD AUTO: 0.03 K/UL (ref 0–0.04)
IMM GRANULOCYTES NFR BLD AUTO: 0.4 % (ref 0–0.5)
LDLC SERPL CALC-MCNC: 65.8 MG/DL (ref 63–159)
LYMPHOCYTES # BLD AUTO: 2.6 K/UL (ref 1–4.8)
LYMPHOCYTES NFR BLD: 32.3 % (ref 18–48)
MCH RBC QN AUTO: 31.8 PG (ref 27–31)
MCHC RBC AUTO-ENTMCNC: 33.3 G/DL (ref 32–36)
MCV RBC AUTO: 96 FL (ref 82–98)
MONOCYTES # BLD AUTO: 0.5 K/UL (ref 0.3–1)
MONOCYTES NFR BLD: 6.3 % (ref 4–15)
NEUTROPHILS # BLD AUTO: 4.6 K/UL (ref 1.8–7.7)
NEUTROPHILS NFR BLD: 56.2 % (ref 38–73)
NONHDLC SERPL-MCNC: 77 MG/DL
NRBC BLD-RTO: 0 /100 WBC
PLATELET # BLD AUTO: 266 K/UL (ref 150–450)
PMV BLD AUTO: 10.8 FL (ref 9.2–12.9)
POTASSIUM SERPL-SCNC: 3.6 MMOL/L (ref 3.5–5.1)
PROT SERPL-MCNC: 7.2 G/DL (ref 6–8.4)
RBC # BLD AUTO: 5 M/UL (ref 4–5.4)
SODIUM SERPL-SCNC: 144 MMOL/L (ref 136–145)
TRIGL SERPL-MCNC: 56 MG/DL (ref 30–150)
WBC # BLD AUTO: 8.15 K/UL (ref 3.9–12.7)

## 2021-11-01 PROCEDURE — 80053 COMPREHEN METABOLIC PANEL: CPT | Performed by: INTERNAL MEDICINE

## 2021-11-01 PROCEDURE — 80061 LIPID PANEL: CPT | Performed by: INTERNAL MEDICINE

## 2021-11-01 PROCEDURE — 85025 COMPLETE CBC W/AUTO DIFF WBC: CPT | Performed by: INTERNAL MEDICINE

## 2021-11-01 PROCEDURE — 82306 VITAMIN D 25 HYDROXY: CPT | Performed by: INTERNAL MEDICINE

## 2021-11-01 PROCEDURE — 36415 COLL VENOUS BLD VENIPUNCTURE: CPT | Performed by: INTERNAL MEDICINE

## 2021-11-12 ENCOUNTER — OFFICE VISIT (OUTPATIENT)
Dept: INTERNAL MEDICINE | Facility: CLINIC | Age: 66
End: 2021-11-12
Payer: MEDICARE

## 2021-11-12 ENCOUNTER — TELEPHONE (OUTPATIENT)
Dept: INTERNAL MEDICINE | Facility: CLINIC | Age: 66
End: 2021-11-12
Payer: COMMERCIAL

## 2021-11-12 VITALS
OXYGEN SATURATION: 97 % | BODY MASS INDEX: 27.4 KG/M2 | WEIGHT: 164.69 LBS | SYSTOLIC BLOOD PRESSURE: 139 MMHG | DIASTOLIC BLOOD PRESSURE: 65 MMHG | HEART RATE: 67 BPM

## 2021-11-12 DIAGNOSIS — M85.80 OSTEOPENIA, UNSPECIFIED LOCATION: ICD-10-CM

## 2021-11-12 DIAGNOSIS — E78.2 MIXED HYPERLIPIDEMIA: ICD-10-CM

## 2021-11-12 DIAGNOSIS — Z00.00 ANNUAL PHYSICAL EXAM: Primary | ICD-10-CM

## 2021-11-12 DIAGNOSIS — L81.4 SOLAR LENTIGO: ICD-10-CM

## 2021-11-12 DIAGNOSIS — F17.210 LIGHT CIGARETTE SMOKER (1-9 CIGS/DAY): ICD-10-CM

## 2021-11-12 DIAGNOSIS — L40.9 PSORIASIS: ICD-10-CM

## 2021-11-12 DIAGNOSIS — J00 ACUTE NASOPHARYNGITIS: ICD-10-CM

## 2021-11-12 DIAGNOSIS — I10 ESSENTIAL HYPERTENSION: ICD-10-CM

## 2021-11-12 PROCEDURE — 99406 PR TOBACCO USE CESSATION INTERMEDIATE 3-10 MINUTES: ICD-10-PCS | Mod: S$PBB,,, | Performed by: INTERNAL MEDICINE

## 2021-11-12 PROCEDURE — 99999 PR PBB SHADOW E&M-EST. PATIENT-LVL IV: CPT | Mod: PBBFAC,,, | Performed by: INTERNAL MEDICINE

## 2021-11-12 PROCEDURE — 99397 PER PM REEVAL EST PAT 65+ YR: CPT | Mod: S$PBB,,, | Performed by: INTERNAL MEDICINE

## 2021-11-12 PROCEDURE — 99214 OFFICE O/P EST MOD 30 MIN: CPT | Mod: PBBFAC | Performed by: INTERNAL MEDICINE

## 2021-11-12 PROCEDURE — 99406 BEHAV CHNG SMOKING 3-10 MIN: CPT | Mod: S$PBB,,, | Performed by: INTERNAL MEDICINE

## 2021-11-12 PROCEDURE — 99999 PR PBB SHADOW E&M-EST. PATIENT-LVL IV: ICD-10-PCS | Mod: PBBFAC,,, | Performed by: INTERNAL MEDICINE

## 2021-11-12 PROCEDURE — 99397 PR PREVENTIVE VISIT,EST,65 & OVER: ICD-10-PCS | Mod: S$PBB,,, | Performed by: INTERNAL MEDICINE

## 2021-11-16 ENCOUNTER — CLINICAL SUPPORT (OUTPATIENT)
Dept: INTERNAL MEDICINE | Facility: CLINIC | Age: 66
End: 2021-11-16
Payer: COMMERCIAL

## 2021-11-16 ENCOUNTER — TELEPHONE (OUTPATIENT)
Dept: INTERNAL MEDICINE | Facility: CLINIC | Age: 66
End: 2021-11-16

## 2021-11-16 VITALS — DIASTOLIC BLOOD PRESSURE: 70 MMHG | OXYGEN SATURATION: 98 % | SYSTOLIC BLOOD PRESSURE: 136 MMHG | HEART RATE: 86 BPM

## 2021-11-16 PROCEDURE — 99999 PR PBB SHADOW E&M-EST. PATIENT-LVL II: CPT | Mod: PBBFAC,,,

## 2021-11-16 PROCEDURE — 99999 PR PBB SHADOW E&M-EST. PATIENT-LVL II: ICD-10-PCS | Mod: PBBFAC,,,

## 2021-11-16 NOTE — TELEPHONE ENCOUNTER
Does patient have record of home blood pressure readings no. Readings have been averaging unknown.   Last dose of blood pressure medication was taken at 0900am.  Patient is asymptomatic.   BP: (!) 140/72 , Pulse: 85.  Patient machine reading 148/68  Blood pressure reading after 15 minutes was 136/70, Pulse 86.  Patient machine reading 132/67

## 2021-11-30 ENCOUNTER — HOSPITAL ENCOUNTER (OUTPATIENT)
Dept: RADIOLOGY | Facility: OTHER | Age: 66
Discharge: HOME | End: 2021-11-30
Attending: INTERNAL MEDICINE
Payer: MEDICARE

## 2021-11-30 DIAGNOSIS — F17.210 LIGHT CIGARETTE SMOKER (1-9 CIGS/DAY): ICD-10-CM

## 2021-11-30 PROCEDURE — 71271 CT THORAX LUNG CANCER SCR C-: CPT | Mod: TC

## 2021-11-30 PROCEDURE — 71271 CT THORAX LUNG CANCER SCR C-: CPT | Mod: 26,,, | Performed by: RADIOLOGY

## 2021-11-30 PROCEDURE — 71271 CT CHEST LUNG SCREENING LOW DOSE: ICD-10-PCS | Mod: 26,,, | Performed by: RADIOLOGY

## 2021-12-18 DIAGNOSIS — E78.2 MIXED HYPERLIPIDEMIA: ICD-10-CM

## 2021-12-28 ENCOUNTER — TELEPHONE (OUTPATIENT)
Dept: INTERNAL MEDICINE | Facility: CLINIC | Age: 66
End: 2021-12-28

## 2021-12-28 RX ORDER — ATORVASTATIN CALCIUM 40 MG/1
TABLET, FILM COATED ORAL
Qty: 90 TABLET | Refills: 3 | Status: SHIPPED | OUTPATIENT
Start: 2021-12-28 | End: 2022-12-13 | Stop reason: SDUPTHER

## 2022-05-12 ENCOUNTER — OFFICE VISIT (OUTPATIENT)
Dept: INTERNAL MEDICINE | Facility: CLINIC | Age: 67
End: 2022-05-12
Payer: COMMERCIAL

## 2022-05-12 ENCOUNTER — PATIENT MESSAGE (OUTPATIENT)
Dept: SMOKING CESSATION | Facility: CLINIC | Age: 67
End: 2022-05-12
Payer: COMMERCIAL

## 2022-05-12 VITALS
DIASTOLIC BLOOD PRESSURE: 70 MMHG | WEIGHT: 163.81 LBS | BODY MASS INDEX: 27.29 KG/M2 | SYSTOLIC BLOOD PRESSURE: 130 MMHG | HEIGHT: 65 IN | OXYGEN SATURATION: 98 % | HEART RATE: 83 BPM

## 2022-05-12 DIAGNOSIS — Z13.1 ENCOUNTER FOR SCREENING FOR DIABETES MELLITUS: ICD-10-CM

## 2022-05-12 DIAGNOSIS — L81.4 SOLAR LENTIGO: ICD-10-CM

## 2022-05-12 DIAGNOSIS — Z00.00 ANNUAL PHYSICAL EXAM: ICD-10-CM

## 2022-05-12 DIAGNOSIS — F17.210 LIGHT CIGARETTE SMOKER (1-9 CIGS/DAY): ICD-10-CM

## 2022-05-12 DIAGNOSIS — M85.80 OSTEOPENIA, UNSPECIFIED LOCATION: ICD-10-CM

## 2022-05-12 DIAGNOSIS — R73.02 IMPAIRED GLUCOSE TOLERANCE: ICD-10-CM

## 2022-05-12 DIAGNOSIS — I10 ESSENTIAL HYPERTENSION: ICD-10-CM

## 2022-05-12 DIAGNOSIS — E78.2 MIXED HYPERLIPIDEMIA: ICD-10-CM

## 2022-05-12 DIAGNOSIS — M21.619 BUNION: Primary | ICD-10-CM

## 2022-05-12 PROBLEM — H61.21 RIGHT EAR IMPACTED CERUMEN: Status: RESOLVED | Noted: 2021-03-03 | Resolved: 2022-05-12

## 2022-05-12 PROCEDURE — 1101F PR PT FALLS ASSESS DOC 0-1 FALLS W/OUT INJ PAST YR: ICD-10-PCS | Mod: CPTII,S$GLB,, | Performed by: INTERNAL MEDICINE

## 2022-05-12 PROCEDURE — 1126F AMNT PAIN NOTED NONE PRSNT: CPT | Mod: CPTII,S$GLB,, | Performed by: INTERNAL MEDICINE

## 2022-05-12 PROCEDURE — 1101F PT FALLS ASSESS-DOCD LE1/YR: CPT | Mod: CPTII,S$GLB,, | Performed by: INTERNAL MEDICINE

## 2022-05-12 PROCEDURE — 99999 PR PBB SHADOW E&M-EST. PATIENT-LVL V: CPT | Mod: PBBFAC,,, | Performed by: INTERNAL MEDICINE

## 2022-05-12 PROCEDURE — 1159F PR MEDICATION LIST DOCUMENTED IN MEDICAL RECORD: ICD-10-PCS | Mod: CPTII,S$GLB,, | Performed by: INTERNAL MEDICINE

## 2022-05-12 PROCEDURE — 3288F FALL RISK ASSESSMENT DOCD: CPT | Mod: CPTII,S$GLB,, | Performed by: INTERNAL MEDICINE

## 2022-05-12 PROCEDURE — 3078F PR MOST RECENT DIASTOLIC BLOOD PRESSURE < 80 MM HG: ICD-10-PCS | Mod: CPTII,S$GLB,, | Performed by: INTERNAL MEDICINE

## 2022-05-12 PROCEDURE — 3075F SYST BP GE 130 - 139MM HG: CPT | Mod: CPTII,S$GLB,, | Performed by: INTERNAL MEDICINE

## 2022-05-12 PROCEDURE — 99406 PR TOBACCO USE CESSATION INTERMEDIATE 3-10 MINUTES: ICD-10-PCS | Mod: S$GLB,,, | Performed by: INTERNAL MEDICINE

## 2022-05-12 PROCEDURE — 1160F PR REVIEW ALL MEDS BY PRESCRIBER/CLIN PHARMACIST DOCUMENTED: ICD-10-PCS | Mod: CPTII,S$GLB,, | Performed by: INTERNAL MEDICINE

## 2022-05-12 PROCEDURE — 99999 PR PBB SHADOW E&M-EST. PATIENT-LVL V: ICD-10-PCS | Mod: PBBFAC,,, | Performed by: INTERNAL MEDICINE

## 2022-05-12 PROCEDURE — 1126F PR PAIN SEVERITY QUANTIFIED, NO PAIN PRESENT: ICD-10-PCS | Mod: CPTII,S$GLB,, | Performed by: INTERNAL MEDICINE

## 2022-05-12 PROCEDURE — 3008F PR BODY MASS INDEX (BMI) DOCUMENTED: ICD-10-PCS | Mod: CPTII,S$GLB,, | Performed by: INTERNAL MEDICINE

## 2022-05-12 PROCEDURE — 3288F PR FALLS RISK ASSESSMENT DOCUMENTED: ICD-10-PCS | Mod: CPTII,S$GLB,, | Performed by: INTERNAL MEDICINE

## 2022-05-12 PROCEDURE — 99215 PR OFFICE/OUTPT VISIT, EST, LEVL V, 40-54 MIN: ICD-10-PCS | Mod: 25,S$GLB,, | Performed by: INTERNAL MEDICINE

## 2022-05-12 PROCEDURE — 1159F MED LIST DOCD IN RCRD: CPT | Mod: CPTII,S$GLB,, | Performed by: INTERNAL MEDICINE

## 2022-05-12 PROCEDURE — 99215 OFFICE O/P EST HI 40 MIN: CPT | Mod: 25,S$GLB,, | Performed by: INTERNAL MEDICINE

## 2022-05-12 PROCEDURE — 3075F PR MOST RECENT SYSTOLIC BLOOD PRESS GE 130-139MM HG: ICD-10-PCS | Mod: CPTII,S$GLB,, | Performed by: INTERNAL MEDICINE

## 2022-05-12 PROCEDURE — 99406 BEHAV CHNG SMOKING 3-10 MIN: CPT | Mod: S$GLB,,, | Performed by: INTERNAL MEDICINE

## 2022-05-12 PROCEDURE — 3008F BODY MASS INDEX DOCD: CPT | Mod: CPTII,S$GLB,, | Performed by: INTERNAL MEDICINE

## 2022-05-12 PROCEDURE — 3078F DIAST BP <80 MM HG: CPT | Mod: CPTII,S$GLB,, | Performed by: INTERNAL MEDICINE

## 2022-05-12 PROCEDURE — 1160F RVW MEDS BY RX/DR IN RCRD: CPT | Mod: CPTII,S$GLB,, | Performed by: INTERNAL MEDICINE

## 2022-05-12 RX ORDER — HYDROCHLOROTHIAZIDE 12.5 MG/1
12.5 TABLET ORAL DAILY
Qty: 90 TABLET | Refills: 3 | Status: SHIPPED | OUTPATIENT
Start: 2022-05-12 | End: 2023-06-11

## 2022-05-12 NOTE — PATIENT INSTRUCTIONS
"Thank you for your message. We have been getting a lot of questions like yours.     [facility] is offering COVID-19 vaccinations in accordance to the recommendations of the [facility].  [facility] recommends scheduling your COVID Vaccine via [employer] by following the directions outlined below.      To Schedule your vaccine on the [employer] website:  Choose "Visits" then "Schedule an Appointment" and select the visit tile titled "COVID-19 Vaccine."    To Schedule your vaccine on the [employer] jerry:  Choose "Appointments" then "Schedule an Appointment" then "Tell us why you're coming in" and select the visit tile titled "COVID Vaccine"      Patients may also call [phone] if they do not have a [facility] account.    More times and dates will become available as we receive more vaccine on a weekly basis. We encourage you to continue to check [facility] as more slots become available and appreciate your patience during this process.    Due to high activity, the [facility] website and COVID hotline may experience a slowdown or long wait times. We sincerely apologize for the inconvenience and appreciate your patience as you try and schedule your vaccination.    We are hopeful that the vaccine will be available to more members of the public soon. We encourage community members and patients to visit [email or web address] or ldh.[place].gov/[email or web address] or covidvaccine.[place].gov for the latest information and resources.     "

## 2022-05-12 NOTE — PROGRESS NOTES
Subjective:       Patient ID: Kristy Wallisch is a 66 y.o. female who  has a past medical history of Benign essential tremor, Cataract, HTN (hypertension), Leg laceration, right, subsequent encounter (2/20/2019), Posterior vitreous detachment of right eye, and Psoriasis.    Chief Complaint: Hypertension and Foot Pain    History was obtained from the patient and supplemented through chart review.  There were no ER or clinic visits since our last appointment.    Retired from [employer] for [employer].  Has a dog.    HPI     Bunion:  Replaced her walking shoes.    HTN:   On Norvasc 10, HCTZ 12.5 d/t leg edema.  Tolerating meds well. Pt denies lightheadedness, dizziness. Mild intermittent ankle edema after Na intake.  Does not have a smart phone, so could not participate with digital hypertension.    Home cuff: Has a wrist cuff. Home machine is accurate.     +tobacco.  Mild snoring. No apnea, daytime fatigue.  Feels refreshed after adequate sleep.      Exercise:  Enjoys being outdoors and working in her garden.  Exercises through walking daily around the neighborhood.  Her balance is better when she exercises. Has exercise videos for rainy days.    Diet:  Cooks at home; no longer eating frozen dinners.  Her  is growing lettuce in the vegetable garden.  Eating improved with half-way.     Tobacco use:    Smokes 0.5 ppd.  Started smoking 19 yoa.  >23 pack year history.  Has never tried to quit before.    Spiral CT 12/01/2021 for lung cancer screening with small benign appearing nodules. Also c emphysema, Atherosclerosis.      HLD:  Is currently taking ASA 81, Lipitor 40 daily.    Lab Results   Component Value Date    LDLCALC 65.8 11/01/2021     The 10-year ASCVD risk score (Saul BUCK Jr., et al., 2013) is: 13.4%    Values used to calculate the score:      Age: 66 years      Sex: Female      Is Non- : No      Diabetic: No      Tobacco smoker: Yes      Systolic Blood  "Pressure: 130 mmHg      Is BP treated: Yes      HDL Cholesterol: 62 mg/dL      Total Cholesterol: 139 mg/dL    A1C borderline high.   Lab Results   Component Value Date/Time    HGBA1C 5.6 03/07/2019 08:26 AM     Osteopenia:    DXA  with low FRAX score.  Is taking Ca/Vit D supplement.  + tobacco.    Exercise: as above.  Lab Results   Component Value Date    VAORCHDN16NL 48 11/01/2021    [id number] 43 07/21/2020     Solar lentigo:   No FHx skin cancer.  H/o being easily sunburned as a child.               Not addressed today.  Essential tremor:   not on BB.  +FHx.  Does not impair handwriting.  Not bothersome.  Lab Results   Component Value Date    TSH 1.98 09/09/2010     Psoriasis:   flares with stress.  No active issues.  Referred to Derm as above.    Review of Systems   Constitutional: Negative for activity change and appetite change.   Respiratory: Negative for wheezing.    Cardiovascular: Negative for leg swelling.   Musculoskeletal: Positive for arthralgias. Negative for gait problem.   Skin: Negative for wound.   Neurological: Negative for dizziness and light-headedness.   Hematological: Negative for adenopathy.   Psychiatric/Behavioral: Negative for confusion. The patient is not nervous/anxious.        I personally reviewed Past Medical History, Past Surgical History, Social History, and Family History.    Objective:      Vitals:    05/12/22 0952   BP: 130/70   Pulse: 83   SpO2: 98%   Weight: 74.3 kg (163 lb 12.8 oz)   Height: 5' 5" (1.651 m)      Physical Exam  Constitutional:       General: She is not in acute distress.     Appearance: She is well-developed. She is not diaphoretic.   HENT:      Head: Normocephalic and atraumatic.      Nose: Nose normal.      Mouth/Throat:      Pharynx: No oropharyngeal exudate.   Eyes:      General: No scleral icterus.        Right eye: No discharge.         Left eye: No discharge.   Neck:      Thyroid: No thyromegaly.      Trachea: No tracheal deviation. "   Cardiovascular:      Rate and Rhythm: Normal rate and regular rhythm.      Heart sounds: Normal heart sounds. No murmur heard.  Pulmonary:      Effort: Pulmonary effort is normal. No respiratory distress.      Breath sounds: Normal breath sounds. No wheezing.   Abdominal:      General: Bowel sounds are normal. There is no distension.      Palpations: Abdomen is soft.      Tenderness: There is no abdominal tenderness.   Musculoskeletal:         General: No deformity.      Cervical back: Neck supple.      Right lower leg: No edema.      Left lower leg: No edema.   Lymphadenopathy:      Cervical: No cervical adenopathy.   Skin:     General: Skin is warm and dry.      Findings: No erythema.   Neurological:      Mental Status: She is alert.      Gait: Gait normal.      Comments: Head tremor   Psychiatric:         Behavior: Behavior normal.           Lab Results   Component Value Date    WBC 8.15 11/01/2021    HGB 15.9 11/01/2021    HCT 47.8 11/01/2021     11/01/2021    CHOL 139 11/01/2021    TRIG 56 11/01/2021    HDL 62 11/01/2021    ALT 17 11/01/2021    AST 19 11/01/2021     11/01/2021    K 3.6 11/01/2021     11/01/2021    CREATININE 0.7 11/01/2021    BUN 11 11/01/2021    CO2 27 11/01/2021    TSH 1.98 09/09/2010    HGBA1C 5.6 03/07/2019       The 10-year ASCVD risk score (Saulgus JANE Jr., et al., 2013) is: 13.4%    Values used to calculate the score:      Age: 66 years      Sex: Female      Is Non- : No      Diabetic: No      Tobacco smoker: Yes      Systolic Blood Pressure: 130 mmHg      Is BP treated: Yes      HDL Cholesterol: 62 mg/dL      Total Cholesterol: 139 mg/dL    (Imaging have been independently reviewed)  Spiral CT 12/01/2021 for lung cancer screening with small benign appearing nodules. Also c emphysema, Atherosclerosis.    Assessment:       1. Bunion    2. Essential hypertension    3. Light cigarette smoker (1-9 cigs/day)    4. Mixed hyperlipidemia    5. Encounter  for screening for diabetes mellitus    6. Impaired glucose tolerance    7. Osteopenia, unspecified location    8. Solar lentigo    9. Annual physical exam          Plan:       Shanae was seen today for hypertension and foot pain.    Diagnoses and all orders for this visit:    Bunion  Comments:  Advised to wear shoes with a roomy toe box. Can refer to podiatry if pain is persistent.    Essential hypertension  Comments:  Controlled. Cont Norvasc 10, HCTZ 12.5 d/t leg edema. Monitor home BP.   Orders:  -     Comprehensive Metabolic Panel; Future  -     hydroCHLOROthiazide (HYDRODIURIL) 12.5 MG Tab; Take 1 tablet (12.5 mg total) by mouth once daily.    Light cigarette smoker (1-9 cigs/day)  Comments:  Counseled for 5 min on tobacco cessation. Spiral CT annually for lung Ca screening. Encouraged to enroll in tobacco cessation classes by 7/2022.  Orders:  -     Ambulatory referral/consult to Smoking Cessation Program; Future    Mixed hyperlipidemia  Comments:  FLP controlled.  Continue aspirin 81, taking Lipitor 40 due to elevated ASCVD.  Monitor FLP annually.  Orders:  -     Lipid Panel; Future    Encounter for screening for diabetes mellitus  -     Hemoglobin A1C; Future    Impaired glucose tolerance  Comments:  A1C borderline high. Will repeat with next labs.  Orders:  -     Hemoglobin A1C; Future    Osteopenia, unspecified location  Comments:  Vit D wnl. Cont Ca/Vit D supplement. Discussed tobacco cessation.  Encouraged exercise regularly.    Orders:  -     Vitamin D; Future    Solar lentigo  Comments:  Discussed applying sunscreen. Referred to Derm for TBSE.  Orders:  -     Ambulatory referral/consult to Dermatology; Future    Annual physical exam  -     CBC Auto Differential; Future  -     Comprehensive Metabolic Panel; Future  -     Lipid Panel; Future  -     Vitamin D; Future  -     hydroCHLOROthiazide (HYDRODIURIL) 12.5 MG Tab; Take 1 tablet (12.5 mg total) by mouth once daily.  -     Hemoglobin A1C; Future          Side effects of medication(s) were discussed in detail and patient voiced understanding.  Patient will call back for any issues or complications.     I have spent a total of 45 minutes with the patient as well as reviewing the chart/medical record and placing orders on the day of the visit. Discussed bunions, HTN, labs, skin cancer screening. 5 minute of that time was spent on tobacco cessation.    RTC in 6 month(s) or sooner PRN for HTN, annual with labs prior.

## 2022-07-01 ENCOUNTER — PATIENT MESSAGE (OUTPATIENT)
Dept: ADMINISTRATIVE | Facility: HOSPITAL | Age: 67
End: 2022-07-01
Payer: COMMERCIAL

## 2022-07-01 DIAGNOSIS — Z12.11 SCREENING FOR COLON CANCER: ICD-10-CM

## 2022-07-26 LAB — HEMOCCULT STL QL IA: NEGATIVE

## 2022-10-04 ENCOUNTER — OFFICE VISIT (OUTPATIENT)
Dept: OPTOMETRY | Facility: CLINIC | Age: 67
End: 2022-10-04
Payer: MEDICARE

## 2022-10-04 DIAGNOSIS — H52.203 HYPEROPIA WITH ASTIGMATISM AND PRESBYOPIA, BILATERAL: ICD-10-CM

## 2022-10-04 DIAGNOSIS — H04.123 DRY EYE SYNDROME OF BOTH EYES: Primary | ICD-10-CM

## 2022-10-04 DIAGNOSIS — H52.03 HYPEROPIA WITH ASTIGMATISM AND PRESBYOPIA, BILATERAL: ICD-10-CM

## 2022-10-04 DIAGNOSIS — H52.4 HYPEROPIA WITH ASTIGMATISM AND PRESBYOPIA, BILATERAL: ICD-10-CM

## 2022-10-04 DIAGNOSIS — H10.13 ALLERGIC CONJUNCTIVITIS OF BOTH EYES: ICD-10-CM

## 2022-10-04 PROCEDURE — 92014 PR EYE EXAM, EST PATIENT,COMPREHESV: ICD-10-PCS | Mod: S$GLB,,, | Performed by: OPTOMETRIST

## 2022-10-04 PROCEDURE — 99999 PR PBB SHADOW E&M-EST. PATIENT-LVL III: ICD-10-PCS | Mod: PBBFAC,,, | Performed by: OPTOMETRIST

## 2022-10-04 PROCEDURE — 99999 PR PBB SHADOW E&M-EST. PATIENT-LVL III: CPT | Mod: PBBFAC,,, | Performed by: OPTOMETRIST

## 2022-10-04 PROCEDURE — 92015 PR REFRACTION: ICD-10-PCS | Mod: S$GLB,,, | Performed by: OPTOMETRIST

## 2022-10-04 PROCEDURE — 92015 DETERMINE REFRACTIVE STATE: CPT | Mod: S$GLB,,, | Performed by: OPTOMETRIST

## 2022-10-04 PROCEDURE — 92014 COMPRE OPH EXAM EST PT 1/>: CPT | Mod: S$GLB,,, | Performed by: OPTOMETRIST

## 2022-10-04 PROCEDURE — 99213 OFFICE O/P EST LOW 20 MIN: CPT | Mod: PBBFAC,PO | Performed by: OPTOMETRIST

## 2022-10-04 NOTE — PROGRESS NOTES
JC    SHERLEY: 09/21  Chief complaint (CC): patient is here for annual ey exam.  Patient hasn't   noticed any vision changes since the lats exam.  Glasses still seem fine.    Eyes have been watering a lot the past couple of weeks but patient has   been around a lot of dust and she has allergies.  Glasses? + 1 yr. old  Contacts? -  H/o eye surgery, injections or laser: -  H/o eye injury: -  Known eye conditions? See above  Family h/o eye conditions? Mother with AMD  Eye gtts? -      (-) Flashes (-)  Floaters (-) Mucous   (+)  Tearing (+) Itching (-) Burning   (-) Headaches (-) Eye Pain/discomfort (-) Irritation   (-)  Redness (-) Double vision (-) Blurry vision    Diabetic? -  A1c? -      Last edited by Marisela Suarez on 10/4/2022  2:27 PM.            Assessment /Plan     For exam results, see Encounter Report.      Dry eye syndrome of both eyes  Recommend Systane Ultra or Refresh Optive BID-TID OU to aid with symptoms of dry eyes.    Allergic conjunctivitis of both eyes  Recommend Zaditor or Alaway bid OU and cool compresses to help soothe itching. Patient advised to RTC if condition gets worse.     Hyperopia with astigmatism and presbyopia, bilateral  SRx released to patient. Patient educated on lens options. Normal ocular health. RTC 1 year for routine exam.

## 2022-10-10 ENCOUNTER — TELEPHONE (OUTPATIENT)
Dept: INTERNAL MEDICINE | Facility: CLINIC | Age: 67
End: 2022-10-10
Payer: COMMERCIAL

## 2022-10-10 DIAGNOSIS — Z12.31 ENCOUNTER FOR SCREENING MAMMOGRAM FOR BREAST CANCER: Primary | ICD-10-CM

## 2022-10-10 NOTE — TELEPHONE ENCOUNTER
----- Message from Gretchen Delgado sent at 10/10/2022  2:42 PM CDT -----  Name of Who is Calling:WALLISCH, KRISTY [[id number]]              What is the request in detail:Requesting a call back to get order for mmg and get her list of medications sent to her.               Can the clinic reply by [facility]:              What Number to Call Back if not in [facility]:[phone]

## 2022-10-10 NOTE — TELEPHONE ENCOUNTER
Schedule pt for mammo on 12/12/22 and mailed med list to pt's home address. Pt verbalized understanding.

## 2022-11-18 ENCOUNTER — LAB VISIT (OUTPATIENT)
Dept: LAB | Facility: OTHER | Age: 67
End: 2022-11-18
Attending: INTERNAL MEDICINE
Payer: MEDICARE

## 2022-11-18 DIAGNOSIS — Z13.1 ENCOUNTER FOR SCREENING FOR DIABETES MELLITUS: ICD-10-CM

## 2022-11-18 DIAGNOSIS — E78.2 MIXED HYPERLIPIDEMIA: ICD-10-CM

## 2022-11-18 DIAGNOSIS — R73.02 IMPAIRED GLUCOSE TOLERANCE: ICD-10-CM

## 2022-11-18 DIAGNOSIS — I10 ESSENTIAL HYPERTENSION: ICD-10-CM

## 2022-11-18 DIAGNOSIS — M85.80 OSTEOPENIA, UNSPECIFIED LOCATION: ICD-10-CM

## 2022-11-18 DIAGNOSIS — Z00.00 ANNUAL PHYSICAL EXAM: ICD-10-CM

## 2022-11-18 LAB
25(OH)D3+25(OH)D2 SERPL-MCNC: 38 NG/ML (ref 30–96)
ALBUMIN SERPL BCP-MCNC: 3.9 G/DL (ref 3.5–5.2)
ALP SERPL-CCNC: 88 U/L (ref 55–135)
ALT SERPL W/O P-5'-P-CCNC: 26 U/L (ref 10–44)
ANION GAP SERPL CALC-SCNC: 8 MMOL/L (ref 8–16)
AST SERPL-CCNC: 23 U/L (ref 10–40)
BASOPHILS # BLD AUTO: 0.05 K/UL (ref 0–0.2)
BASOPHILS NFR BLD: 0.7 % (ref 0–1.9)
BILIRUB SERPL-MCNC: 0.7 MG/DL (ref 0.1–1)
BUN SERPL-MCNC: 10 MG/DL (ref 8–23)
CALCIUM SERPL-MCNC: 9 MG/DL (ref 8.7–10.5)
CHLORIDE SERPL-SCNC: 107 MMOL/L (ref 95–110)
CHOLEST SERPL-MCNC: 160 MG/DL (ref 120–199)
CHOLEST/HDLC SERPL: 2.3 {RATIO} (ref 2–5)
CO2 SERPL-SCNC: 26 MMOL/L (ref 23–29)
CREAT SERPL-MCNC: 0.7 MG/DL (ref 0.5–1.4)
DIFFERENTIAL METHOD: ABNORMAL
EOSINOPHIL # BLD AUTO: 0.3 K/UL (ref 0–0.5)
EOSINOPHIL NFR BLD: 3.6 % (ref 0–8)
ERYTHROCYTE [DISTWIDTH] IN BLOOD BY AUTOMATED COUNT: 13.3 % (ref 11.5–14.5)
EST. GFR  (NO RACE VARIABLE): >60 ML/MIN/1.73 M^2
ESTIMATED AVG GLUCOSE: 108 MG/DL (ref 68–131)
GLUCOSE SERPL-MCNC: 102 MG/DL (ref 70–110)
HBA1C MFR BLD: 5.4 % (ref 4–5.6)
HCT VFR BLD AUTO: 44.4 % (ref 37–48.5)
HDLC SERPL-MCNC: 69 MG/DL (ref 40–75)
HDLC SERPL: 43.1 % (ref 20–50)
HGB BLD-MCNC: 15.1 G/DL (ref 12–16)
IMM GRANULOCYTES # BLD AUTO: 0.02 K/UL (ref 0–0.04)
IMM GRANULOCYTES NFR BLD AUTO: 0.3 % (ref 0–0.5)
LDLC SERPL CALC-MCNC: 77.4 MG/DL (ref 63–159)
LYMPHOCYTES # BLD AUTO: 2.4 K/UL (ref 1–4.8)
LYMPHOCYTES NFR BLD: 31.6 % (ref 18–48)
MCH RBC QN AUTO: 32.3 PG (ref 27–31)
MCHC RBC AUTO-ENTMCNC: 34 G/DL (ref 32–36)
MCV RBC AUTO: 95 FL (ref 82–98)
MONOCYTES # BLD AUTO: 0.5 K/UL (ref 0.3–1)
MONOCYTES NFR BLD: 7.1 % (ref 4–15)
NEUTROPHILS # BLD AUTO: 4.2 K/UL (ref 1.8–7.7)
NEUTROPHILS NFR BLD: 56.7 % (ref 38–73)
NONHDLC SERPL-MCNC: 91 MG/DL
NRBC BLD-RTO: 0 /100 WBC
PLATELET # BLD AUTO: 230 K/UL (ref 150–450)
PMV BLD AUTO: 10.5 FL (ref 9.2–12.9)
POTASSIUM SERPL-SCNC: 3.6 MMOL/L (ref 3.5–5.1)
PROT SERPL-MCNC: 6.7 G/DL (ref 6–8.4)
RBC # BLD AUTO: 4.68 M/UL (ref 4–5.4)
SODIUM SERPL-SCNC: 141 MMOL/L (ref 136–145)
TRIGL SERPL-MCNC: 68 MG/DL (ref 30–150)
WBC # BLD AUTO: 7.43 K/UL (ref 3.9–12.7)

## 2022-11-18 PROCEDURE — 80061 LIPID PANEL: CPT | Performed by: INTERNAL MEDICINE

## 2022-11-18 PROCEDURE — 83036 HEMOGLOBIN GLYCOSYLATED A1C: CPT | Performed by: INTERNAL MEDICINE

## 2022-11-18 PROCEDURE — 85025 COMPLETE CBC W/AUTO DIFF WBC: CPT | Performed by: INTERNAL MEDICINE

## 2022-11-18 PROCEDURE — 36415 COLL VENOUS BLD VENIPUNCTURE: CPT | Performed by: INTERNAL MEDICINE

## 2022-11-18 PROCEDURE — 80053 COMPREHEN METABOLIC PANEL: CPT | Performed by: INTERNAL MEDICINE

## 2022-11-18 PROCEDURE — 82306 VITAMIN D 25 HYDROXY: CPT | Performed by: INTERNAL MEDICINE

## 2022-11-19 ENCOUNTER — OFFICE VISIT (OUTPATIENT)
Dept: URGENT CARE | Facility: CLINIC | Age: 67
End: 2022-11-19
Payer: MEDICARE

## 2022-11-19 VITALS
DIASTOLIC BLOOD PRESSURE: 67 MMHG | BODY MASS INDEX: 27.16 KG/M2 | WEIGHT: 163 LBS | RESPIRATION RATE: 16 BRPM | SYSTOLIC BLOOD PRESSURE: 142 MMHG | HEART RATE: 82 BPM | TEMPERATURE: 98 F | OXYGEN SATURATION: 98 % | HEIGHT: 65 IN

## 2022-11-19 DIAGNOSIS — J06.9 UPPER RESPIRATORY TRACT INFECTION, UNSPECIFIED TYPE: ICD-10-CM

## 2022-11-19 DIAGNOSIS — R05.9 COUGH, UNSPECIFIED TYPE: Primary | ICD-10-CM

## 2022-11-19 DIAGNOSIS — F17.200 NEEDS SMOKING CESSATION EDUCATION: ICD-10-CM

## 2022-11-19 LAB
CTP QC/QA: YES
SARS-COV-2 AG RESP QL IA.RAPID: NEGATIVE

## 2022-11-19 PROCEDURE — 3008F PR BODY MASS INDEX (BMI) DOCUMENTED: ICD-10-PCS | Mod: CPTII,S$GLB,, | Performed by: FAMILY MEDICINE

## 2022-11-19 PROCEDURE — 99213 PR OFFICE/OUTPT VISIT, EST, LEVL III, 20-29 MIN: ICD-10-PCS | Mod: S$GLB,,, | Performed by: FAMILY MEDICINE

## 2022-11-19 PROCEDURE — 3078F DIAST BP <80 MM HG: CPT | Mod: CPTII,S$GLB,, | Performed by: FAMILY MEDICINE

## 2022-11-19 PROCEDURE — 99213 OFFICE O/P EST LOW 20 MIN: CPT | Mod: S$GLB,,, | Performed by: FAMILY MEDICINE

## 2022-11-19 PROCEDURE — 1126F AMNT PAIN NOTED NONE PRSNT: CPT | Mod: CPTII,S$GLB,, | Performed by: FAMILY MEDICINE

## 2022-11-19 PROCEDURE — 3077F SYST BP >= 140 MM HG: CPT | Mod: CPTII,S$GLB,, | Performed by: FAMILY MEDICINE

## 2022-11-19 PROCEDURE — 3077F PR MOST RECENT SYSTOLIC BLOOD PRESSURE >= 140 MM HG: ICD-10-PCS | Mod: CPTII,S$GLB,, | Performed by: FAMILY MEDICINE

## 2022-11-19 PROCEDURE — U0002 SARS CORONAVIRUS 2 ANTIGEN POCT, MANUAL READ: ICD-10-PCS | Mod: QW,CR,S$GLB, | Performed by: PHYSICIAN ASSISTANT

## 2022-11-19 PROCEDURE — 1126F PR PAIN SEVERITY QUANTIFIED, NO PAIN PRESENT: ICD-10-PCS | Mod: CPTII,S$GLB,, | Performed by: FAMILY MEDICINE

## 2022-11-19 PROCEDURE — 3044F HG A1C LEVEL LT 7.0%: CPT | Mod: CPTII,S$GLB,, | Performed by: FAMILY MEDICINE

## 2022-11-19 PROCEDURE — U0002 COVID-19 LAB TEST NON-CDC: HCPCS | Mod: QW,CR,S$GLB, | Performed by: PHYSICIAN ASSISTANT

## 2022-11-19 PROCEDURE — 3044F PR MOST RECENT HEMOGLOBIN A1C LEVEL <7.0%: ICD-10-PCS | Mod: CPTII,S$GLB,, | Performed by: FAMILY MEDICINE

## 2022-11-19 PROCEDURE — 3078F PR MOST RECENT DIASTOLIC BLOOD PRESSURE < 80 MM HG: ICD-10-PCS | Mod: CPTII,S$GLB,, | Performed by: FAMILY MEDICINE

## 2022-11-19 PROCEDURE — 3008F BODY MASS INDEX DOCD: CPT | Mod: CPTII,S$GLB,, | Performed by: FAMILY MEDICINE

## 2022-11-19 NOTE — PROGRESS NOTES
"Subjective:       Patient ID: Kristy Wallisch is a 67 y.o. female.    Vitals:  height is 5' 5" (1.651 m) and weight is 73.9 kg (163 lb). Her temperature is 97.7 °F (36.5 °C). Her blood pressure is 142/67 (abnormal) and her pulse is 82. Her respiration is 16 and oxygen saturation is 98%.     Chief Complaint: Cough    Pt reports feeling fatigued for the last few days, believed it was from vacation but spouse tested positive for covid with at home test.     Cough  This is a new problem. The current episode started in the past 7 days. The problem has been unchanged. She has tried nothing for the symptoms. The treatment provided no relief.     Respiratory:  Positive for cough.      Objective:      Physical Exam   Constitutional: She is oriented to person, place, and time. She appears well-developed. She is cooperative.  Non-toxic appearance. She does not appear ill. No distress.   HENT:   Head: Normocephalic and atraumatic.   Ears:   Right Ear: Hearing, tympanic membrane, external ear and ear canal normal.   Left Ear: Hearing, tympanic membrane, external ear and ear canal normal.   Nose: Nose normal. No mucosal edema, rhinorrhea or nasal deformity. No epistaxis. Right sinus exhibits no maxillary sinus tenderness and no frontal sinus tenderness. Left sinus exhibits no maxillary sinus tenderness and no frontal sinus tenderness.   Mouth/Throat: Uvula is midline and mucous membranes are normal. No trismus in the jaw. Normal dentition. No uvula swelling. Posterior oropharyngeal erythema present. No oropharyngeal exudate or posterior oropharyngeal edema.   Eyes: Conjunctivae and lids are normal. No scleral icterus.   Neck: Trachea normal and phonation normal. Neck supple. No edema present. No erythema present. No neck rigidity present.   Cardiovascular: Normal rate, regular rhythm, normal heart sounds and normal pulses.   Pulmonary/Chest: Effort normal and breath sounds normal. No respiratory distress. She has no decreased " breath sounds. She has no rhonchi.   Abdominal: Normal appearance.   Musculoskeletal: Normal range of motion.         General: No deformity. Normal range of motion.   Neurological: She is alert and oriented to person, place, and time. She exhibits normal muscle tone. Coordination normal.   Skin: Skin is warm, dry, intact, not diaphoretic and not pale.   Psychiatric: Her speech is normal and behavior is normal. Judgment and thought content normal.   Nursing note and vitals reviewed.      Assessment:       1. Cough, unspecified type    2. Upper respiratory tract infection, unspecified type          Plan:         Cough, unspecified type  -     SARS Coronavirus 2 Antigen, POCT Manual Read    Upper respiratory tract infection, unspecified type  Patient reassured. Her covid test came back negative. Advised to follow standard covid protocols because her 's covid test came back positive.

## 2022-11-25 ENCOUNTER — OFFICE VISIT (OUTPATIENT)
Dept: URGENT CARE | Facility: CLINIC | Age: 67
End: 2022-11-25
Payer: MEDICARE

## 2022-11-25 ENCOUNTER — PATIENT MESSAGE (OUTPATIENT)
Dept: INTERNAL MEDICINE | Facility: CLINIC | Age: 67
End: 2022-11-25
Payer: MEDICARE

## 2022-11-25 VITALS
RESPIRATION RATE: 14 BRPM | HEART RATE: 73 BPM | BODY MASS INDEX: 27.15 KG/M2 | SYSTOLIC BLOOD PRESSURE: 148 MMHG | WEIGHT: 162.94 LBS | DIASTOLIC BLOOD PRESSURE: 74 MMHG | OXYGEN SATURATION: 96 % | TEMPERATURE: 98 F | HEIGHT: 65 IN

## 2022-11-25 DIAGNOSIS — U07.1 COVID-19: Primary | ICD-10-CM

## 2022-11-25 DIAGNOSIS — R05.9 COUGH, UNSPECIFIED TYPE: ICD-10-CM

## 2022-11-25 DIAGNOSIS — U07.1 COVID-19 VIRUS DETECTED: ICD-10-CM

## 2022-11-25 LAB
CTP QC/QA: YES
SARS-COV-2 AG RESP QL IA.RAPID: POSITIVE

## 2022-11-25 PROCEDURE — 3078F PR MOST RECENT DIASTOLIC BLOOD PRESSURE < 80 MM HG: ICD-10-PCS | Mod: CPTII,S$GLB,, | Performed by: NURSE PRACTITIONER

## 2022-11-25 PROCEDURE — 1126F AMNT PAIN NOTED NONE PRSNT: CPT | Mod: CPTII,S$GLB,, | Performed by: NURSE PRACTITIONER

## 2022-11-25 PROCEDURE — 3008F BODY MASS INDEX DOCD: CPT | Mod: CPTII,S$GLB,, | Performed by: NURSE PRACTITIONER

## 2022-11-25 PROCEDURE — 87811 SARS CORONAVIRUS 2 ANTIGEN POCT, MANUAL READ: ICD-10-PCS | Mod: QW,CR,S$GLB, | Performed by: NURSE PRACTITIONER

## 2022-11-25 PROCEDURE — 99204 OFFICE O/P NEW MOD 45 MIN: CPT | Mod: CR,S$GLB,, | Performed by: NURSE PRACTITIONER

## 2022-11-25 PROCEDURE — 1160F PR REVIEW ALL MEDS BY PRESCRIBER/CLIN PHARMACIST DOCUMENTED: ICD-10-PCS | Mod: CPTII,S$GLB,, | Performed by: NURSE PRACTITIONER

## 2022-11-25 PROCEDURE — 99204 PR OFFICE/OUTPT VISIT, NEW, LEVL IV, 45-59 MIN: ICD-10-PCS | Mod: CR,S$GLB,, | Performed by: NURSE PRACTITIONER

## 2022-11-25 PROCEDURE — 1160F RVW MEDS BY RX/DR IN RCRD: CPT | Mod: CPTII,S$GLB,, | Performed by: NURSE PRACTITIONER

## 2022-11-25 PROCEDURE — 3077F PR MOST RECENT SYSTOLIC BLOOD PRESSURE >= 140 MM HG: ICD-10-PCS | Mod: CPTII,S$GLB,, | Performed by: NURSE PRACTITIONER

## 2022-11-25 PROCEDURE — 3077F SYST BP >= 140 MM HG: CPT | Mod: CPTII,S$GLB,, | Performed by: NURSE PRACTITIONER

## 2022-11-25 PROCEDURE — 1159F PR MEDICATION LIST DOCUMENTED IN MEDICAL RECORD: ICD-10-PCS | Mod: CPTII,S$GLB,, | Performed by: NURSE PRACTITIONER

## 2022-11-25 PROCEDURE — 3008F PR BODY MASS INDEX (BMI) DOCUMENTED: ICD-10-PCS | Mod: CPTII,S$GLB,, | Performed by: NURSE PRACTITIONER

## 2022-11-25 PROCEDURE — 3044F HG A1C LEVEL LT 7.0%: CPT | Mod: CPTII,S$GLB,, | Performed by: NURSE PRACTITIONER

## 2022-11-25 PROCEDURE — 3044F PR MOST RECENT HEMOGLOBIN A1C LEVEL <7.0%: ICD-10-PCS | Mod: CPTII,S$GLB,, | Performed by: NURSE PRACTITIONER

## 2022-11-25 PROCEDURE — 1126F PR PAIN SEVERITY QUANTIFIED, NO PAIN PRESENT: ICD-10-PCS | Mod: CPTII,S$GLB,, | Performed by: NURSE PRACTITIONER

## 2022-11-25 PROCEDURE — 3078F DIAST BP <80 MM HG: CPT | Mod: CPTII,S$GLB,, | Performed by: NURSE PRACTITIONER

## 2022-11-25 PROCEDURE — 87811 SARS-COV-2 COVID19 W/OPTIC: CPT | Mod: QW,CR,S$GLB, | Performed by: NURSE PRACTITIONER

## 2022-11-25 PROCEDURE — 1159F MED LIST DOCD IN RCRD: CPT | Mod: CPTII,S$GLB,, | Performed by: NURSE PRACTITIONER

## 2022-11-25 NOTE — PROGRESS NOTES
"Subjective:       Patient ID: Kristy Wallisch is a 67 y.o. female.    Vitals:  height is 5' 5" (1.651 m) and weight is 73.9 kg (162 lb 14.7 oz). Her oral temperature is 98.3 °F (36.8 °C). Her blood pressure is 148/74 (abnormal) and her pulse is 73. Her respiration is 14 and oxygen saturation is 96%.     Chief Complaint: COVID-19 Concerns (Tested positive for COVID on a home test.  tested positive for COVID last Saturday and has taken Paxlovid but still tested positive today. - Entered by patient)    Pt is a 66 yo female w/ c/o stuffy nose and cough that started 4 days ago. She was seen 6 days ago with fatigue and COVID exposure by her spouse. She took a home test today when her symptoms worsened which was positive. Denies fever. She has not taken anything for her symptoms.     Cough  This is a new problem. The current episode started in the past 7 days. The problem has been unchanged. The cough is Non-productive. Associated symptoms include nasal congestion and rhinorrhea. She has tried nothing for the symptoms. The treatment provided no relief.     Respiratory:  Positive for cough.      Objective:      Physical Exam   Constitutional: She is oriented to person, place, and time. She appears well-developed. She is cooperative.  Non-toxic appearance. She does not appear ill. No distress.   HENT:   Head: Normocephalic and atraumatic.   Ears:   Right Ear: Hearing, tympanic membrane, external ear and ear canal normal.   Left Ear: Hearing, tympanic membrane, external ear and ear canal normal.   Nose: Nose normal. No mucosal edema, rhinorrhea or nasal deformity. No epistaxis. Right sinus exhibits no maxillary sinus tenderness and no frontal sinus tenderness. Left sinus exhibits no maxillary sinus tenderness and no frontal sinus tenderness.   Mouth/Throat: Uvula is midline and mucous membranes are normal. No trismus in the jaw. Normal dentition. No uvula swelling. Posterior oropharyngeal erythema present. No " oropharyngeal exudate or posterior oropharyngeal edema.   Eyes: Conjunctivae and lids are normal. No scleral icterus.   Neck: Trachea normal and phonation normal. Neck supple. No edema present. No erythema present. No neck rigidity present.   Cardiovascular: Normal rate, regular rhythm, normal heart sounds and normal pulses.   Pulmonary/Chest: Effort normal and breath sounds normal. No respiratory distress. She has no decreased breath sounds. She has no rhonchi.   Abdominal: Normal appearance.   Musculoskeletal: Normal range of motion.         General: No deformity. Normal range of motion.   Neurological: She is alert and oriented to person, place, and time. She exhibits normal muscle tone. Coordination normal.   Skin: Skin is warm, dry, intact, not diaphoretic and not pale.   Psychiatric: Her speech is normal and behavior is normal. Judgment and thought content normal.   Nursing note and vitals reviewed.    Results for orders placed or performed in visit on 11/25/22   SARS Coronavirus 2 Antigen, POCT Manual Read   Result Value Ref Range    SARS Coronavirus 2 Antigen Positive (A) Negative     Acceptable Yes            Assessment:       1. COVID-19    2. Cough, unspecified type          Plan:         COVID-19  -     nirmatrelvir-ritonavir 300 mg (150 mg x 2)-100 mg copackaged tablets (EUA); Take 3 tablets by mouth 2 (two) times daily for 5 days. Each dose contains 2 nirmatrelvir (pink tablets) and 1 ritonavir (white tablet). Take all 3 tablets together  Dispense: 30 tablet; Refill: 0    Cough, unspecified type  -     SARS Coronavirus 2 Antigen, POCT Manual Read       Patient Instructions   - You must understand that you have received an Urgent Care treatment only and that you may be released before all of your medical problems are known or treated.   - You, the patient, will arrange for follow up care as instructed.   - If your condition worsens or fails to improve we recommend that you receive another  evaluation at the ER immediately or contact your PCP to discuss your concerns.   - You can call [phone] or [phone] to help schedule an appointment with the appropriate provider.    Drink plenty of fluids   Get lots of rest  Tylenol or ibuprofen for pain/fever  Mucinex DM for cough  Saline nasal rinses to irrigate sinus cavities  Warm salt water gargles for sore throat    Isolation:   Stay home for 5 days.  If you have no symptoms or your symptoms are resolving after 5 days, you can leave your house.  Continue to wear a mask around others for 5 additional days.  If you have a fever, continue to stay home until your fever resolves.    Monitor your blood pressure while taking PAXLOVID. If your blood pressure becomes very low, consult a medical professional. Please read the facts sheet included with your prescription before taking the medication.

## 2022-11-25 NOTE — PATIENT INSTRUCTIONS
- You must understand that you have received an Urgent Care treatment only and that you may be released before all of your medical problems are known or treated.   - You, the patient, will arrange for follow up care as instructed.   - If your condition worsens or fails to improve we recommend that you receive another evaluation at the ER immediately or contact your PCP to discuss your concerns.   - You can call [phone] or [phone] to help schedule an appointment with the appropriate provider.    Drink plenty of fluids   Get lots of rest  Tylenol or ibuprofen for pain/fever  Mucinex DM for cough  Saline nasal rinses to irrigate sinus cavities  Warm salt water gargles for sore throat    Isolation:   Stay home for 5 days.  If you have no symptoms or your symptoms are resolving after 5 days, you can leave your house.  Continue to wear a mask around others for 5 additional days.  If you have a fever, continue to stay home until your fever resolves.    Monitor your blood pressure while taking PAXLOVID. If your blood pressure becomes very low, consult a medical professional. Please read the facts sheet included with your prescription before taking the medication.

## 2022-11-26 ENCOUNTER — PATIENT MESSAGE (OUTPATIENT)
Dept: INTERNAL MEDICINE | Facility: CLINIC | Age: 67
End: 2022-11-26
Payer: MEDICARE

## 2022-11-28 ENCOUNTER — TELEPHONE (OUTPATIENT)
Dept: PRIMARY CARE CLINIC | Facility: CLINIC | Age: 67
End: 2022-11-28
Payer: MEDICARE

## 2022-11-28 NOTE — TELEPHONE ENCOUNTER
Informed pt that she should quarantine for 5 days since being dx with Covid and her appt with Dr. Nazario should be moved back. Pt's annual was reschedule to 12/13/22 with Dr. Nazario. Pt verbalized understanding.

## 2022-11-28 NOTE — TELEPHONE ENCOUNTER
----- Message from Carolann Swartz sent at 11/28/2022  1:21 PM CST -----  Regarding: pt appt tomorrow  Name of Who is Calling:WALLISCH, KRISTY [[id number]]          What is the request in detail: Pt has an appt tomorrow. She was diagnosed w/ Covid Friday. She is feeling fine, but she is unsure if she should still come in for her appt tomorrow c of the Covid diagnosis. Please call back to let her know what to do.          Can the clinic reply by [facility]:  no        [facility] Back [facility][phone]

## 2022-11-29 NOTE — TELEPHONE ENCOUNTER
Would need to hold statin while taking Paxlovid, wait 5 days after completing to restart statin if takes. If it's been >5 days since symptom onset and feeling better I would avoid starting Paxlovid at this time. Please let pt know, thank you!

## 2022-11-29 NOTE — TELEPHONE ENCOUNTER
Informed patient of Dr. Carbajal message and she stated she's feeling really fine and won't take the Paxlovid.

## 2022-12-07 NOTE — PROGRESS NOTES
"Subjective:       Patient ID: Kristy Wallisch is a 67 y.o. female.    Vitals:  height is 5' 5" (1.651 m) and weight is 73.9 kg (163 lb). Her temperature is 97.7 °F (36.5 °C). Her blood pressure is 142/67 (abnormal) and her pulse is 82. Her respiration is 16 and oxygen saturation is 98%.     Chief Complaint: Cough    Patient present with a cough. She is requesting to get a covid 19 test done.  ROS    Objective:      Physical Exam   Constitutional: She is oriented to person, place, and time. She appears well-developed. She is cooperative.  Non-toxic appearance. She does not appear ill. No distress.   HENT:   Head: Normocephalic and atraumatic.   Ears:   Right Ear: Hearing, tympanic membrane, external ear and ear canal normal.   Left Ear: Hearing, tympanic membrane, external ear and ear canal normal.   Nose: Nose normal. No mucosal edema, rhinorrhea or nasal deformity. No epistaxis. Right sinus exhibits no maxillary sinus tenderness and no frontal sinus tenderness. Left sinus exhibits no maxillary sinus tenderness and no frontal sinus tenderness.   Mouth/Throat: Uvula is midline and mucous membranes are normal. No trismus in the jaw. Normal dentition. No uvula swelling. Posterior oropharyngeal erythema present. No oropharyngeal exudate or posterior oropharyngeal edema.   Eyes: Conjunctivae and lids are normal. No scleral icterus.   Neck: Trachea normal and phonation normal. Neck supple. No edema present. No erythema present. No neck rigidity present.   Cardiovascular: Normal rate, regular rhythm, normal heart sounds and normal pulses.   Pulmonary/Chest: Effort normal and breath sounds normal. No respiratory distress. She has no decreased breath sounds. She has no rhonchi.   Abdominal: Normal appearance.   Musculoskeletal: Normal range of motion.         General: No deformity. Normal range of motion.   Neurological: She is alert and oriented to person, place, and time. She exhibits normal muscle tone. Coordination " normal.   Skin: Skin is warm, dry, intact, not diaphoretic and not pale.   Psychiatric: Her speech is normal and behavior is normal. Judgment and thought content normal.   Nursing note and vitals reviewed.      Assessment:       1. Cough, unspecified type    2. Upper respiratory tract infection, unspecified type            Plan:         Cough, unspecified type  -     SARS Coronavirus 2 Antigen, POCT Manual Read    Upper respiratory tract infection, unspecified type

## 2022-12-12 ENCOUNTER — HOSPITAL ENCOUNTER (OUTPATIENT)
Dept: RADIOLOGY | Facility: OTHER | Age: 67
Discharge: HOME | End: 2022-12-12
Attending: INTERNAL MEDICINE
Payer: COMMERCIAL

## 2022-12-12 DIAGNOSIS — Z12.31 ENCOUNTER FOR SCREENING MAMMOGRAM FOR BREAST CANCER: ICD-10-CM

## 2022-12-12 PROCEDURE — 77067 SCR MAMMO BI INCL CAD: CPT | Mod: 26,,, | Performed by: RADIOLOGY

## 2022-12-12 PROCEDURE — 77063 MAMMO DIGITAL SCREENING BILAT WITH TOMO: ICD-10-PCS | Mod: 26,,, | Performed by: RADIOLOGY

## 2022-12-12 PROCEDURE — 77063 BREAST TOMOSYNTHESIS BI: CPT | Mod: 26,,, | Performed by: RADIOLOGY

## 2022-12-12 PROCEDURE — 77063 BREAST TOMOSYNTHESIS BI: CPT | Mod: TC

## 2022-12-12 PROCEDURE — 77067 SCR MAMMO BI INCL CAD: CPT | Mod: TC

## 2022-12-12 PROCEDURE — 77067 MAMMO DIGITAL SCREENING BILAT WITH TOMO: ICD-10-PCS | Mod: 26,,, | Performed by: RADIOLOGY

## 2022-12-13 ENCOUNTER — OFFICE VISIT (OUTPATIENT)
Dept: INTERNAL MEDICINE | Facility: CLINIC | Age: 67
End: 2022-12-13
Payer: MEDICARE

## 2022-12-13 VITALS
HEIGHT: 65 IN | BODY MASS INDEX: 27.32 KG/M2 | WEIGHT: 164 LBS | OXYGEN SATURATION: 99 % | HEART RATE: 76 BPM | DIASTOLIC BLOOD PRESSURE: 70 MMHG | SYSTOLIC BLOOD PRESSURE: 132 MMHG

## 2022-12-13 DIAGNOSIS — M85.80 OSTEOPENIA, UNSPECIFIED LOCATION: ICD-10-CM

## 2022-12-13 DIAGNOSIS — U07.1 COVID-19 VIRUS INFECTION: ICD-10-CM

## 2022-12-13 DIAGNOSIS — I10 ESSENTIAL HYPERTENSION: ICD-10-CM

## 2022-12-13 DIAGNOSIS — F17.210 LIGHT CIGARETTE SMOKER (1-9 CIGS/DAY): ICD-10-CM

## 2022-12-13 DIAGNOSIS — E78.2 MIXED HYPERLIPIDEMIA: ICD-10-CM

## 2022-12-13 DIAGNOSIS — Z00.00 ANNUAL PHYSICAL EXAM: Primary | ICD-10-CM

## 2022-12-13 PROCEDURE — 99999 PR PBB SHADOW E&M-EST. PATIENT-LVL V: ICD-10-PCS | Mod: PBBFAC,CR,, | Performed by: INTERNAL MEDICINE

## 2022-12-13 PROCEDURE — 99397 PER PM REEVAL EST PAT 65+ YR: CPT | Mod: GZ,S$PBB,CR,25 | Performed by: INTERNAL MEDICINE

## 2022-12-13 PROCEDURE — 99406 BEHAV CHNG SMOKING 3-10 MIN: CPT | Mod: S$PBB,,, | Performed by: INTERNAL MEDICINE

## 2022-12-13 PROCEDURE — 99215 OFFICE O/P EST HI 40 MIN: CPT | Mod: PBBFAC | Performed by: INTERNAL MEDICINE

## 2022-12-13 PROCEDURE — 99406 PR TOBACCO USE CESSATION INTERMEDIATE 3-10 MINUTES: ICD-10-PCS | Mod: S$PBB,,, | Performed by: INTERNAL MEDICINE

## 2022-12-13 PROCEDURE — 99999 PR PBB SHADOW E&M-EST. PATIENT-LVL V: CPT | Mod: PBBFAC,CR,, | Performed by: INTERNAL MEDICINE

## 2022-12-13 PROCEDURE — 99397 PR PREVENTIVE VISIT,EST,65 & OVER: ICD-10-PCS | Mod: GZ,S$PBB,CR,25 | Performed by: INTERNAL MEDICINE

## 2022-12-13 RX ORDER — ATORVASTATIN CALCIUM 40 MG/1
40 TABLET, FILM COATED ORAL DAILY
Qty: 90 TABLET | Refills: 3 | Status: SHIPPED | OUTPATIENT
Start: 2022-12-13 | End: 2023-08-30 | Stop reason: SDUPTHER

## 2022-12-13 NOTE — PATIENT INSTRUCTIONS
Tests to Keep You Healthy    Mammogram: SCHEDULED  Colon Cancer Screening: Met on 7/17/2022  Last Blood Pressure <= 139/89 (12/13/2022): Yes  Tobacco Cessation: NO      Daniel Jolly,     If you are due for any health screening(s) below please notify me so we can arrange them to be ordered and scheduled to maintain your health. Most healthy patients complete it. Don't lose out on improving your health.     Tests to Keep You Healthy    Mammogram: SCHEDULED  Colon Cancer Screening: Met on 7/17/2022  Last Blood Pressure <= 139/89 (12/13/2022): Yes  Tobacco Cessation: NO      Breast Cancer Screening    Breast cancer is the second most common cancer in women after skin cancer, and the second leading cause of death from cancer after lung cancer. Mammograms can detect breast cancer early, which significantly increases the chances of curing the cancer.      A screening mammogram is an x-ray image of the breasts used for early breast cancer detection. It can help reduce the number of deaths from breast cancer among women. To get a clear image, the breast is placed between two plastic plates to make it flat. How often a mammogram is needed depends on your age and your breast cancer risk.    Although you are still overdue for this important screening, due to the COVID-19 pandemic, we recommend scheduling it in the near future.

## 2022-12-13 NOTE — PROGRESS NOTES
Subjective:       Patient ID: Kristy Wallisch is a 67 y.o. female who  has a past medical history of Benign essential tremor, Cataract, HTN (hypertension), Leg laceration, right, subsequent encounter (2/20/2019), Posterior vitreous detachment of right eye, and Psoriasis.    Chief Complaint: Annual Exam    History was obtained from the patient and supplemented through chart review.  The patient was seen in Urgent Care about 2 weeks ago for COVID.    Retired from  for [employer].  Has a dog.    HPI     +COVID 11/25.  was also +. Rhinitis, congestion. Rx'd Paxlovid by , but never took d/t drug interaction c statin and she felt better. COVID sx weren't too bad. Some sneezing, but no major sx lingering.  UTD COVID, flu vaccines.    HTN:   BP is controlled. On Norvasc 10, HCTZ 12.5 d/t leg edema.  Took meds about 1 hour ago. Tolerating meds well.   Does not have a smart phone, so could not participate with digital hypertension.    Home cuff: Has a wrist cuff. Home machine is accurate.     +tobacco.  Mild snoring. No apnea, daytime fatigue.  Feels refreshed after adequate sleep.      Exercise:  Enjoys being outdoors and working in her garden.  Exercises through walking daily around the neighborhood.  Her balance is better when she exercises. Has exercise videos for rainy days.    Diet:  Cooks at home; no longer eating frozen dinners.  Her  is growing lettuce in the vegetable garden.  Eating improved with custodial.     ETOH: 1-2 drinks, 4/week.     Tobacco use:    Smokes 0.5 ppd.  Started smoking at 19 yoa.  >23 pack year history.  Has never tried to quit before.    Spiral CT 12/01/2021 for lung cancer screening with small benign appearing nodules. Also c emphysema, Atherosclerosis.      HLD:  Is currently taking ASA 81, Lipitor 40 daily.    Lab Results   Component Value Date    LDLCALC 77.4 11/18/2022     The 10-year ASCVD risk score (Osmany MORENO, et al., 2019) is: 15.6%     "Values used to calculate the score:      Age: 67 years      Sex: Female      Is Non- : No      Diabetic: No      Tobacco smoker: Yes      Systolic Blood Pressure: 132 mmHg      Is BP treated: Yes      HDL Cholesterol: 69 mg/dL      Total Cholesterol: 160 mg/dL    Osteopenia:    DXA  with low FRAX score.  Is taking Ca/Vit D supplement.  + tobacco.    Exercise: as above.  Lab Results   Component Value Date    YIZKKSVC16TI 38 11/18/2022    [id number] 48 11/01/2021    [id number] 43 07/21/2020               Not addressed today.  Essential tremor:   not on BB.  +FHx.  Does not impair handwriting.  Not bothersome.  Lab Results   Component Value Date    TSH 1.98 09/09/2010     Solar lentigo:   No FHx skin cancer.  H/o being easily sunburned as a child.   Discussed applying sunscreen. Referred to Derm for TBSE.    Psoriasis:   flares with stress.  No active issues.  Referred to Derm as above.    Review of Systems   Constitutional:  Negative for appetite change.   Respiratory:  Negative for wheezing.    Cardiovascular:  Negative for leg swelling.   Musculoskeletal:  Positive for arthralgias. Negative for gait problem.   Skin:  Negative for wound.   Hematological:  Negative for adenopathy.   Psychiatric/Behavioral:  Negative for confusion. The patient is not nervous/anxious.        I personally reviewed Past Medical History, Past Surgical History, Social History, and Family History.    Objective:      Vitals:    12/13/22 0803 12/13/22 0816   BP: (!) 142/72 132/70   Pulse: 76    SpO2: 99%    Weight: 74.4 kg (164 lb 0.4 oz)    Height: 5' 5" (1.651 m)         Physical Exam  Constitutional:       General: She is not in acute distress.     Appearance: She is well-developed. She is not diaphoretic.   HENT:      Head: Normocephalic and atraumatic.      Nose: Nose normal.      Mouth/Throat:      Pharynx: No oropharyngeal exudate.   Eyes:      General: No scleral icterus.        Right eye: No " discharge.         Left eye: No discharge.   Neck:      Thyroid: No thyromegaly.      Trachea: No tracheal deviation.   Cardiovascular:      Rate and Rhythm: Normal rate and regular rhythm.      Heart sounds: Normal heart sounds. No murmur heard.  Pulmonary:      Effort: Pulmonary effort is normal. No respiratory distress.      Breath sounds: Normal breath sounds. No wheezing.   Abdominal:      General: Bowel sounds are normal. There is no distension.      Palpations: Abdomen is soft.      Tenderness: There is no abdominal tenderness.   Musculoskeletal:         General: No deformity.      Cervical back: Neck supple.      Right lower leg: No edema.      Left lower leg: No edema.   Lymphadenopathy:      Cervical: No cervical adenopathy.   Skin:     General: Skin is warm and dry.      Findings: No erythema.   Neurological:      Mental Status: She is alert.      Gait: Gait normal.      Comments: Head tremor   Psychiatric:         Behavior: Behavior normal.         Lab Results   Component Value Date    WBC 7.43 11/18/2022    HGB 15.1 11/18/2022    HCT 44.4 11/18/2022     11/18/2022    CHOL 160 11/18/2022    TRIG 68 11/18/2022    HDL 69 11/18/2022    ALT 26 11/18/2022    AST 23 11/18/2022     11/18/2022    K 3.6 11/18/2022     11/18/2022    CREATININE 0.7 11/18/2022    BUN 10 11/18/2022    CO2 26 11/18/2022    TSH 1.98 09/09/2010    HGBA1C 5.4 11/18/2022       The 10-year ASCVD risk score (Osmany DK, et al., 2019) is: 15.6%    Values used to calculate the score:      Age: 67 years      Sex: Female      Is Non- : No      Diabetic: No      Tobacco smoker: Yes      Systolic Blood Pressure: 132 mmHg      Is BP treated: Yes      HDL Cholesterol: 69 mg/dL      Total Cholesterol: 160 mg/dL    (Imaging have been independently reviewed)  Spiral CT 12/01/2021 for lung cancer screening with small benign appearing nodules. Also c emphysema, Atherosclerosis.    Assessment:       1. Annual  physical exam    2. COVID-19 virus infection    3. Essential hypertension    4. Light cigarette smoker (1-9 cigs/day)    5. Mixed hyperlipidemia    6. Osteopenia, unspecified location            Plan:       Shanae was seen today for annual exam.    Diagnoses and all orders for this visit:    Annual physical exam    COVID-19 virus infection  Comments:  Recovered. Doing well. UTD vaccines.    Essential hypertension  Comments:  Controlled. Cont Norvasc 10, HCTZ 12.5 d/t leg edema. Monitor home BP.  CMP wnl.    Light cigarette smoker (1-9 cigs/day)  Comments:  Counseled for 5 min on tobacco cessation. Spiral CT annually for lung Ca screening. Enrolled in tobacco cessation clinic; encouraged.  Orders:  -     CT Chest Lung Screening Low Dose; Future    Mixed hyperlipidemia  Comments:  FLP controlled.  Continue aspirin 81, taking Lipitor 40 due to elevated ASCVD.  Monitor FLP annually.  Orders:  -     atorvastatin (LIPITOR) 40 MG tablet; Take 1 tablet (40 mg total) by mouth once daily.    Osteopenia, unspecified location  Comments:  DXA c low FRAX; plan to repeat next year. Vit D wnl. Cont Ca/Vit D supplement. Discussed tobacco cessation.  Encouraged exercise regularly.             Side effects of medication(s) were discussed in detail and patient voiced understanding.  Patient will call back for any issues or complications.     Health Maintenance   Topic Date Due    Mammogram  09/16/2022    LDCT Lung Screen  11/30/2022    DEXA Scan  02/27/2023    TETANUS VACCINE  01/01/2026    Lipid Panel  11/18/2027    Hepatitis C Screening  Completed     RTC in 6 month(s) or sooner PRN for HTN.

## 2022-12-29 ENCOUNTER — HOSPITAL ENCOUNTER (OUTPATIENT)
Dept: RADIOLOGY | Facility: OTHER | Age: 67
Discharge: HOME | End: 2022-12-29
Attending: INTERNAL MEDICINE
Payer: MEDICARE

## 2022-12-29 DIAGNOSIS — F17.210 LIGHT CIGARETTE SMOKER (1-9 CIGS/DAY): ICD-10-CM

## 2022-12-29 PROCEDURE — 71271 CT CHEST LUNG SCREENING LOW DOSE: ICD-10-PCS | Mod: 26,,, | Performed by: RADIOLOGY

## 2022-12-29 PROCEDURE — 71271 CT THORAX LUNG CANCER SCR C-: CPT | Mod: TC

## 2022-12-29 PROCEDURE — 71271 CT THORAX LUNG CANCER SCR C-: CPT | Mod: 26,,, | Performed by: RADIOLOGY

## 2023-04-14 ENCOUNTER — PATIENT MESSAGE (OUTPATIENT)
Dept: RESEARCH | Facility: HOSPITAL | Age: 68
End: 2023-04-14
Payer: MEDICARE

## 2023-06-11 DIAGNOSIS — I10 ESSENTIAL HYPERTENSION: ICD-10-CM

## 2023-06-11 DIAGNOSIS — Z00.00 ANNUAL PHYSICAL EXAM: ICD-10-CM

## 2023-06-11 RX ORDER — HYDROCHLOROTHIAZIDE 12.5 MG/1
TABLET ORAL
Qty: 90 TABLET | Refills: 1 | Status: SHIPPED | OUTPATIENT
Start: 2023-06-11 | End: 2023-12-11 | Stop reason: SDUPTHER

## 2023-06-11 RX ORDER — AMLODIPINE BESYLATE 10 MG/1
TABLET ORAL
Qty: 90 TABLET | Refills: 1 | Status: SHIPPED | OUTPATIENT
Start: 2023-06-11 | End: 2023-12-11 | Stop reason: SDUPTHER

## 2023-06-11 NOTE — TELEPHONE ENCOUNTER
Refill Decision Note   Shanae Wallisch  is requesting a refill authorization.  Brief Assessment and Rationale for Refill:  Approve     Medication Therapy Plan:         Comments:     No Care Gaps recommended.     Note composed:1:29 PM 06/11/2023             Isotretinoin Pregnancy And Lactation Text: This medication is Pregnancy Category X and is considered extremely dangerous during pregnancy. It is unknown if it is excreted in breast milk.

## 2023-06-11 NOTE — TELEPHONE ENCOUNTER
No care due was identified.  [facility] Embedded Care Due Messages. Reference number: [id number].   6/11/2023 7:00:52 AM CDT

## 2023-06-15 ENCOUNTER — OFFICE VISIT (OUTPATIENT)
Dept: INTERNAL MEDICINE | Facility: CLINIC | Age: 68
End: 2023-06-15
Payer: MEDICARE

## 2023-06-15 VITALS
WEIGHT: 160.19 LBS | BODY MASS INDEX: 26.65 KG/M2 | SYSTOLIC BLOOD PRESSURE: 134 MMHG | HEART RATE: 72 BPM | OXYGEN SATURATION: 99 % | DIASTOLIC BLOOD PRESSURE: 78 MMHG

## 2023-06-15 DIAGNOSIS — Z78.0 ASYMPTOMATIC MENOPAUSAL STATE: ICD-10-CM

## 2023-06-15 DIAGNOSIS — M85.80 OSTEOPENIA, UNSPECIFIED LOCATION: ICD-10-CM

## 2023-06-15 DIAGNOSIS — Z12.11 SCREENING FOR COLORECTAL CANCER: ICD-10-CM

## 2023-06-15 DIAGNOSIS — Z00.00 HEALTH MAINTENANCE EXAMINATION: ICD-10-CM

## 2023-06-15 DIAGNOSIS — Z12.12 SCREENING FOR COLORECTAL CANCER: ICD-10-CM

## 2023-06-15 DIAGNOSIS — E55.9 VITAMIN D DEFICIENCY: ICD-10-CM

## 2023-06-15 DIAGNOSIS — M21.612 BUNION, LEFT FOOT: ICD-10-CM

## 2023-06-15 DIAGNOSIS — I10 ESSENTIAL HYPERTENSION: Primary | ICD-10-CM

## 2023-06-15 DIAGNOSIS — G25.0 BENIGN ESSENTIAL TREMOR: ICD-10-CM

## 2023-06-15 DIAGNOSIS — R01.1 NEWLY RECOGNIZED HEART MURMUR: ICD-10-CM

## 2023-06-15 DIAGNOSIS — E78.2 MIXED HYPERLIPIDEMIA: ICD-10-CM

## 2023-06-15 PROCEDURE — 99214 PR OFFICE/OUTPT VISIT, EST, LEVL IV, 30-39 MIN: ICD-10-PCS | Mod: S$PBB,,, | Performed by: STUDENT IN AN ORGANIZED HEALTH CARE EDUCATION/TRAINING PROGRAM

## 2023-06-15 PROCEDURE — 99999 PR PBB SHADOW E&M-EST. PATIENT-LVL IV: CPT | Mod: PBBFAC,,, | Performed by: STUDENT IN AN ORGANIZED HEALTH CARE EDUCATION/TRAINING PROGRAM

## 2023-06-15 PROCEDURE — 99214 OFFICE O/P EST MOD 30 MIN: CPT | Mod: PBBFAC | Performed by: STUDENT IN AN ORGANIZED HEALTH CARE EDUCATION/TRAINING PROGRAM

## 2023-06-15 PROCEDURE — 99214 OFFICE O/P EST MOD 30 MIN: CPT | Mod: S$PBB,,, | Performed by: STUDENT IN AN ORGANIZED HEALTH CARE EDUCATION/TRAINING PROGRAM

## 2023-06-15 PROCEDURE — 99999 PR PBB SHADOW E&M-EST. PATIENT-LVL IV: ICD-10-PCS | Mod: PBBFAC,,, | Performed by: STUDENT IN AN ORGANIZED HEALTH CARE EDUCATION/TRAINING PROGRAM

## 2023-06-15 NOTE — PROGRESS NOTES
Subjective:       Patient ID: Kristy Wallisch is a 67 y.o. female.    Chief Complaint: Essential hypertension [I10]    Patient is new to me, here to establish care.    Health maintenance -   FOBT negative JULY2022. Due for repeat screening.  Denies family history of colorectal cancer.  Mammogram BI-RADS 1 in DEC2022.  Denies history of abnormal mammogram.  Family history of breast cancer.  Denies family history of ovarian cancer.  Completed pap screening.  Denies history of abnormal pap smear.  Family history of cardiac disease.  UTD on Tdap, COVID primary/bivalent, PPSV23, PCV13, shingles vaccinations.  Due for COVID bivalent vaccinations.  Started smoking at age 24, at most 1 PPD. Currently smoking 0.5 PPD.  Drinks alcohol 5-7 times weekly, 1 drinks per sitting.  Denies current drug use.  Low dose lung CT scan was LUNG-RADS 2 in DEC2022.   Completed HIV and hepatitis C screening.  UTD on diabetes screening.  Lab Results       Component                Value               Date                       HGBA1C                   5.4                 11/18/2022            Endorses overall healthy diet.   Eating plenty of fresh vegetables, fruits.  Eating mostly lean proteins.   Eats mostly at home.  Endorses active lifestyle.    HTN -   Currently prescribed amlodipine, HCTZ.  Patient endorses taking medication as directed.  Denies side effects or concerns while taking medication.  Due for micro albumin creatinine ratio.   BP Readings from Last 5 Encounters:  06/15/23 : 134/78  12/13/22 : 132/70  11/25/22 : (!) 148/74  11/19/22 : (!) 142/67  05/12/22 : 130/70    HLD -   Endorses taking atorvastatin as directed  Denies side effects or concerns while taking medication  Lab Results       Component                Value               Date                       CHOL                     160                 11/18/2022            Lab Results       Component                Value               Date                       LDLCALC          "         77.4                11/18/2022            Lab Results       Component                Value               Date                       HDL                      69                  11/18/2022              Osteopenia -   Vitamin D deficiency -   Completed DEXA in FEB2020.  Showed osteopenia.  "There is a 10.8% risk of a major osteoporotic fracture and a 2.5% risk of hip fracture in the next 10 years (FRAX)."  Currently taking vitamin D3 daily.  Currently taking calcium daily.  Lab Results       Component                Value               Date                       [id number]             38                  11/18/2022                 [id number]             48                  11/01/2021                 [id number]             43                  07/21/2020            Denies family history of osteoporosis.    Endorses bunion on the left foot, would like bunion evaluated    Essential tremor -   Diagnosed 15 years ago  Has not followed with neurology since initial diagnoses  Not significant changes since diagnosis     Noted with heart murmur today  Denies associated signs/symptoms        Review of Systems   Constitutional:  Negative for appetite change, chills, fatigue, fever and unexpected weight change.   Respiratory:  Negative for cough and shortness of breath.    Cardiovascular:  Negative for chest pain, palpitations and leg swelling.   Gastrointestinal:  Negative for abdominal pain, constipation, diarrhea, nausea and vomiting.   Skin:  Negative for rash.   Neurological:  Negative for dizziness, syncope, weakness and headaches.         Current Outpatient Medications   Medication Instructions    amLODIPine (NORVASC) 10 MG tablet TAKE 1 TABLET(10 MG) BY MOUTH EVERY DAY    ascorbic acid, vitamin C, (VITAMIN C) 500 MG tablet Take by mouth. 1 Tablet Oral Every day    aspirin 81 MG Chew Take by mouth. 1 Tablet, Chewable Oral Every day    atorvastatin (LIPITOR) 40 mg, Oral, Daily    calcium citrate-vitamin D3 " 315-200 mg (CITRACAL+D) 315-200 mg-unit per tablet 1 tablet, Oral, 2 times daily    calcium phosphate/vitamin D2 (CALCIUM-VITAMIN D2 ORAL) No dose, route, or frequency recorded.    GLUCOSAM HCL/MSM/CHONDRO MONTE A (GLUCOSAMINE HCL-MSM-CHONDROITN) 375-250-300 mg Tab Take by mouth. 1 Tablet Oral Every day    hydroCHLOROthiazide (HYDRODIURIL) 12.5 MG Tab TAKE 1 TABLET(12.5 MG) BY MOUTH EVERY DAY    multivitamin (THERAGRAN) per tablet Take by mouth. 1 Tablet Oral Every day     Objective:      Vitals:    06/15/23 1023   BP: 134/78   Pulse: 72   SpO2: 99%   Weight: 72.7 kg (160 lb 2.6 oz)   PainSc: 0-No pain     Body mass index is 26.65 kg/m².    Physical Exam  Vitals reviewed.   Constitutional:       General: She is not in acute distress.     Appearance: Normal appearance. She is not ill-appearing or diaphoretic.   HENT:      Head: Normocephalic and atraumatic.      Right Ear: Tympanic membrane, ear canal and external ear normal. There is no impacted cerumen.      Left Ear: Tympanic membrane, ear canal and external ear normal. There is no impacted cerumen.      Nose: Nose normal. No rhinorrhea.      Mouth/Throat:      Mouth: Mucous membranes are moist.      Pharynx: Oropharynx is clear. No oropharyngeal exudate or posterior oropharyngeal erythema.   Eyes:      General: No scleral icterus.        Right eye: No discharge.         Left eye: No discharge.      Conjunctiva/sclera: Conjunctivae normal.   Neck:      Thyroid: No thyromegaly or thyroid tenderness.      Trachea: Trachea normal.   Cardiovascular:      Rate and Rhythm: Normal rate and regular rhythm.      Heart sounds: Murmur heard.      Systolic murmur is present with a grade of 4/6.      No friction rub.   Pulmonary:      Effort: Pulmonary effort is normal. No respiratory distress.      Breath sounds: Normal breath sounds. No stridor. No wheezing, rhonchi or rales.   Abdominal:      General: There is no distension.      Palpations: Abdomen is soft.      Tenderness:  There is no abdominal tenderness. There is no guarding or rebound.   Musculoskeletal:         General: No swelling or deformity.      Cervical back: Neck supple.   Lymphadenopathy:      Head:      Right side of head: No submandibular or posterior auricular adenopathy.      Left side of head: No submandibular or posterior auricular adenopathy.      Cervical: No cervical adenopathy.      Right cervical: No superficial, deep or posterior cervical adenopathy.     Left cervical: No superficial, deep or posterior cervical adenopathy.      Upper Body:      Right upper body: No supraclavicular adenopathy.      Left upper body: No supraclavicular adenopathy.   Skin:     General: Skin is warm and dry.   Neurological:      General: No focal deficit present.      Mental Status: She is alert. Mental status is at baseline.      Gait: Gait normal.   Psychiatric:         Mood and Affect: Mood normal.         Behavior: Behavior normal.         Assessment:       1. Essential hypertension    2. Mixed hyperlipidemia    3. Osteopenia, unspecified location    4. Vitamin D deficiency    5. Bunion, left foot    6. Benign essential tremor    7. Newly recognized heart murmur    8. Health maintenance examination    9. Screening for colorectal cancer    10. Asymptomatic menopausal state        Plan:       Essential hypertension  Continue current medications.  RTC in 6 months for follow up.    Mixed hyperlipidemia  Continue current medications.  RTC in 6 months for follow up.    Osteopenia, unspecified location  -     DXA Bone Density Axial Skeleton 1 or more sites; Future    Vitamin D deficiency  Continue supplementation.  RTC in 6 months for follow up.    Bunion, left foot  -     Ambulatory referral/consult to Podiatry; Future    Benign essential tremor  Continue current medications.  RTC in 6 months for follow up.    Newly recognized heart murmur  -     Echo Saline Bubble? No; Future    Health maintenance examination  Reviewed and discussed  age appropriate screenings and immunizations.    Screening for colorectal cancer  -     Cologuard Screening (Multitarget Stool DNA); Future  -     Cologuard Screening (Multitarget Stool DNA)    Asymptomatic menopausal state  -     DXA Bone Density Axial Skeleton 1 or more sites; Future        Leslie Carbajal MD  6/15/2023

## 2023-06-16 ENCOUNTER — TELEPHONE (OUTPATIENT)
Dept: ORTHOPEDICS | Facility: CLINIC | Age: 68
End: 2023-06-16
Payer: MEDICARE

## 2023-06-19 ENCOUNTER — TELEPHONE (OUTPATIENT)
Dept: ORTHOPEDICS | Facility: CLINIC | Age: 68
End: 2023-06-19
Payer: MEDICARE

## 2023-06-27 ENCOUNTER — OFFICE VISIT (OUTPATIENT)
Dept: PODIATRY | Facility: CLINIC | Age: 68
End: 2023-06-27
Payer: MEDICARE

## 2023-06-27 VITALS
HEART RATE: 85 BPM | DIASTOLIC BLOOD PRESSURE: 68 MMHG | BODY MASS INDEX: 26.66 KG/M2 | WEIGHT: 160 LBS | SYSTOLIC BLOOD PRESSURE: 148 MMHG | HEIGHT: 65 IN

## 2023-06-27 DIAGNOSIS — M21.612 BUNION, LEFT FOOT: ICD-10-CM

## 2023-06-27 DIAGNOSIS — M79.672 FOOT PAIN, LEFT: ICD-10-CM

## 2023-06-27 DIAGNOSIS — M20.12 HALLUX VALGUS OF LEFT FOOT: Primary | ICD-10-CM

## 2023-06-27 PROCEDURE — 99204 OFFICE O/P NEW MOD 45 MIN: CPT | Mod: S$PBB,,, | Performed by: PODIATRIST

## 2023-06-27 PROCEDURE — 99214 OFFICE O/P EST MOD 30 MIN: CPT | Mod: PBBFAC,PN | Performed by: PODIATRIST

## 2023-06-27 PROCEDURE — 99204 PR OFFICE/OUTPT VISIT, NEW, LEVL IV, 45-59 MIN: ICD-10-PCS | Mod: S$PBB,,, | Performed by: PODIATRIST

## 2023-06-27 PROCEDURE — 99999 PR PBB SHADOW E&M-EST. PATIENT-LVL IV: ICD-10-PCS | Mod: PBBFAC,,, | Performed by: PODIATRIST

## 2023-06-27 PROCEDURE — 99999 PR PBB SHADOW E&M-EST. PATIENT-LVL IV: CPT | Mod: PBBFAC,,, | Performed by: PODIATRIST

## 2023-06-27 RX ORDER — MELOXICAM 15 MG/1
15 TABLET ORAL DAILY
Qty: 30 TABLET | Refills: 0 | Status: SHIPPED | OUTPATIENT
Start: 2023-06-27 | End: 2023-09-27

## 2023-06-27 NOTE — PROGRESS NOTES
Subjective:      Patient ID: Kristy Wallisch is a 67 y.o. female.    Chief Complaint: Bunion, left foot and Nail Problem (R 2nd digit)    Pain, crooked toe and bump left big toe area.  Gradual onset, worsening over the past several years.  Aggravated by increased weight-bearing prolonged standing in some shoes particularly on hikes.  No prior medical treatment.  Self-treatment only with over-the-counter Tylenol has been minimally effective but she is tried minimally.    Review of Systems   Constitutional: Negative for chills, diaphoresis, fever, malaise/fatigue and night sweats.   Cardiovascular:  Negative for claudication, cyanosis, leg swelling and syncope.   Skin:  Negative for color change, dry skin, nail changes, rash, suspicious lesions and unusual hair distribution.   Musculoskeletal:  Positive for joint pain. Negative for falls, joint swelling, muscle cramps, muscle weakness and stiffness.   Gastrointestinal:  Negative for constipation, diarrhea, nausea and vomiting.   Neurological:  Negative for brief paralysis, disturbances in coordination, focal weakness, numbness, paresthesias, sensory change and tremors.         Objective:      Physical Exam  Constitutional:       General: She is not in acute distress.     Appearance: She is well-developed. She is not diaphoretic.   Cardiovascular:      Pulses:           Popliteal pulses are 2+ on the right side and 2+ on the left side.        Dorsalis pedis pulses are 2+ on the right side and 2+ on the left side.        Posterior tibial pulses are 2+ on the right side and 2+ on the left side.      Comments: Capillary refill 3 seconds all toes/distal feet, all toes/both feet warm to touch.      Negative lymphadenopathy bilateral popliteal fossa and tarsal tunnel.      Negavie lower extremity edema bilateral.    Musculoskeletal:      Right ankle: No swelling, deformity, ecchymosis or lacerations. Normal range of motion. Normal pulse.      Right Achilles Tendon: Normal.  No defects. Brown's test negative.      Comments: Visible and palpable bunion with pain at dorsomedial 1st metatarsal head left.  Hallux abducted left partially reducible, tracks laterally without being track bound.  No ecchymosis, erythema, edema, or cardinal signs infection or signs of trauma same foot.    Otherwise, Normal angle, base, station of gait. All ten toes without clubbing, cyanosis, or signs of ischemia.  No pain to palpation bilateral lower extremities.  Range of motion, stability, muscle strength, and muscle tone normal bilateral feet and legs.    Lymphadenopathy:      Lower Body: No right inguinal adenopathy. No left inguinal adenopathy.      Comments: Negative lymphadenopathy bilateral popliteal fossa and tarsal tunnel.    Negative lymphangitic streaking bilateral feet/ankles/legs.   Skin:     General: Skin is warm and dry.      Capillary Refill: Capillary refill takes 2 to 3 seconds.      Coloration: Skin is not pale.      Findings: No abrasion, bruising, burn, ecchymosis, erythema, laceration, lesion or rash.      Nails: There is no clubbing.      Comments:   Skin is normal age and health appropriate color, turgor, texture, and temperature bilateral lower extremities without ulceration, hyperpigmentation, discoloration, masses nodules or cords palpated.  No ecchymosis, erythema, edema, or cardinal signs of infection bilateral lower extremities.     Neurological:      Mental Status: She is alert and oriented to person, place, and time.      Sensory: No sensory deficit.      Motor: No tremor, atrophy or abnormal muscle tone.      Gait: Gait normal.      Comments: Negative tinel sign to percussion sural, superficial peroneal, deep peroneal, saphenous, and posterior tibial nerves right and left ankles and feet.     Psychiatric:         Behavior: Behavior is cooperative.           Assessment:       Encounter Diagnoses   Name Primary?    Bunion, left foot     Hallux valgus of left foot Yes    Foot  pain, left          Plan:       Shanae was seen today for bunion, left foot and nail problem.    Diagnoses and all orders for this visit:    Hallux valgus of left foot  -     X-Ray Foot Complete Left; Future    Bunion, left foot  -     Ambulatory referral/consult to Podiatry  -     X-Ray Foot Complete Left; Future    Foot pain, left  -     X-Ray Foot Complete Left; Future    Other orders  -     meloxicam (MOBIC) 15 MG tablet; Take 1 tablet (15 mg total) by mouth once daily.      I counseled the patient on her conditions, their implications and medical management.        We discussed the conservative management hallux valgus.      I recommend the patient have her shoes spot stretched and ensure that there appropriately-sized chosen.      Recommend over-the-counter arch supports.      Patient will stretch the tendo achilles complex three times daily as demonstrated in the office.  Literature was dispensed illustrating proper stretching technique.    The patient was advised that NSAID-type medications have two very important potential side effects: gastrointestinal irritation including hemorrhage and renal injuries. She was asked to take the medication with food and to stop if she experiences any GI upset. I asked her to call for vomiting, abdominal pain or black/bloody stools. The patient expresses understanding of these issues and questions were answered.    Discussed conservative treatment with shoes of adequate dimensions, material, and style to alleviate symptoms and delay or prevent surgical intervention.    Xrays, meloxicam    Padding taping splinting as effective to relieve symptoms.          Follow up if symptoms worsen or fail to improve.

## 2023-06-29 ENCOUNTER — HOSPITAL ENCOUNTER (OUTPATIENT)
Dept: RADIOLOGY | Facility: HOSPITAL | Age: 68
Discharge: HOME | End: 2023-06-29
Attending: PODIATRIST
Payer: MEDICARE

## 2023-06-29 DIAGNOSIS — M79.672 FOOT PAIN, LEFT: ICD-10-CM

## 2023-06-29 DIAGNOSIS — M21.612 BUNION, LEFT FOOT: ICD-10-CM

## 2023-06-29 DIAGNOSIS — M20.12 HALLUX VALGUS OF LEFT FOOT: ICD-10-CM

## 2023-06-29 PROCEDURE — 73630 X-RAY EXAM OF FOOT: CPT | Mod: TC,PN,LT

## 2023-06-29 PROCEDURE — 73630 XR FOOT COMPLETE 3 VIEW LEFT: ICD-10-PCS | Mod: 26,LT,, | Performed by: RADIOLOGY

## 2023-06-29 PROCEDURE — 73630 X-RAY EXAM OF FOOT: CPT | Mod: 26,LT,, | Performed by: RADIOLOGY

## 2023-07-02 ENCOUNTER — PATIENT MESSAGE (OUTPATIENT)
Dept: ADMINISTRATIVE | Facility: HOSPITAL | Age: 68
End: 2023-07-02
Payer: MEDICARE

## 2023-07-05 DIAGNOSIS — Z12.12 SCREENING FOR COLORECTAL CANCER: ICD-10-CM

## 2023-07-05 DIAGNOSIS — Z12.11 SCREENING FOR COLORECTAL CANCER: ICD-10-CM

## 2023-07-05 DIAGNOSIS — R19.5 POSITIVE COLORECTAL CANCER SCREENING USING COLOGUARD TEST: Primary | ICD-10-CM

## 2023-07-05 LAB — NONINV COLON CA DNA+OCC BLD SCRN STL QL: POSITIVE

## 2023-07-06 ENCOUNTER — TELEPHONE (OUTPATIENT)
Dept: ENDOSCOPY | Facility: HOSPITAL | Age: 68
End: 2023-07-06
Payer: MEDICARE

## 2023-07-06 VITALS — HEIGHT: 65 IN | BODY MASS INDEX: 26.66 KG/M2 | WEIGHT: 160 LBS

## 2023-07-06 DIAGNOSIS — Z12.11 SCREENING FOR COLORECTAL CANCER: ICD-10-CM

## 2023-07-06 DIAGNOSIS — Z12.12 SCREENING FOR COLORECTAL CANCER: ICD-10-CM

## 2023-07-06 DIAGNOSIS — Z12.11 SPECIAL SCREENING FOR MALIGNANT NEOPLASMS, COLON: Primary | ICD-10-CM

## 2023-07-06 DIAGNOSIS — R19.5 POSITIVE COLORECTAL CANCER SCREENING USING COLOGUARD TEST: Primary | ICD-10-CM

## 2023-07-06 NOTE — TELEPHONE ENCOUNTER
Spoke to patient to schedule procedure(s) Colonoscopy       Physician to perform procedure(s) Dr. HAIR Chavez  Date of Procedure (s) 09/11/2023  Arrival Time 9:40 am   Time of Procedure(s) 10:40 am    Location of Procedure(s) [place] 4th Floor  Type of Rx Prep sent to patient: PEG  Instructions provided to patient via [facility]    Patient was informed on the following information and verbalized understanding. Screening questionnaire reviewed with patient and complete. If procedure requires anesthesia, a responsible adult needs to be present to accompany the patient home, patient cannot drive after receiving anesthesia. Appointment details are tentative, especially check-in time. Patient will receive a prep-op call 4 days prior to confirm check-in time for procedure. If applicable the patient should contact their pharmacy to verify Rx for procedure prep is ready for pick-up. Patient was advised to call the scheduling department at [phone] if pharmacy states no Rx is available. Patient was advised to call the endoscopy scheduling department if any questions or concerns arise.      [facility] Scheduling Department

## 2023-08-04 ENCOUNTER — HOSPITAL ENCOUNTER (OUTPATIENT)
Dept: RADIOLOGY | Facility: OTHER | Age: 68
Discharge: HOME | End: 2023-08-04
Attending: STUDENT IN AN ORGANIZED HEALTH CARE EDUCATION/TRAINING PROGRAM
Payer: MEDICARE

## 2023-08-04 ENCOUNTER — HOSPITAL ENCOUNTER (OUTPATIENT)
Dept: CARDIOLOGY | Facility: OTHER | Age: 68
Discharge: HOME | End: 2023-08-04
Attending: STUDENT IN AN ORGANIZED HEALTH CARE EDUCATION/TRAINING PROGRAM
Payer: MEDICARE

## 2023-08-04 VITALS
WEIGHT: 160 LBS | HEIGHT: 65 IN | BODY MASS INDEX: 26.66 KG/M2 | DIASTOLIC BLOOD PRESSURE: 68 MMHG | SYSTOLIC BLOOD PRESSURE: 148 MMHG | HEART RATE: 85 BPM

## 2023-08-04 DIAGNOSIS — R01.1 NEWLY RECOGNIZED HEART MURMUR: ICD-10-CM

## 2023-08-04 DIAGNOSIS — Z78.0 ASYMPTOMATIC MENOPAUSAL STATE: ICD-10-CM

## 2023-08-04 DIAGNOSIS — M85.80 OSTEOPENIA, UNSPECIFIED LOCATION: ICD-10-CM

## 2023-08-04 LAB
ASCENDING AORTA: 2.68 CM
AV INDEX (PROSTH): 0.43
AV MEAN GRADIENT: 15 MMHG
AV PEAK GRADIENT: 26 MMHG
AV REGURGITATION PRESSURE HALF TIME: 643 MS
AV VALVE AREA BY VELOCITY RATIO: 1.31 CM²
AV VALVE AREA: 1.21 CM²
AV VELOCITY RATIO: 0.47
BSA FOR ECHO PROCEDURE: 1.82 M2
CV ECHO LV RWT: 0.41 CM
DOP CALC AO PEAK VEL: 2.53 M/S
DOP CALC AO VTI: 58.8 CM
DOP CALC LVOT AREA: 2.8 CM2
DOP CALC LVOT DIAMETER: 1.89 CM
DOP CALC LVOT PEAK VEL: 1.18 M/S
DOP CALC LVOT STROKE VOLUME: 70.94 CM3
DOP CALC RVOT PEAK VEL: 1.09 M/S
DOP CALC RVOT VTI: 21.8 CM
DOP CALCLVOT PEAK VEL VTI: 25.3 CM
E WAVE DECELERATION TIME: 230.18 MSEC
E/A RATIO: 0.64
E/E' RATIO: 10.73 M/S
ECHO LV POSTERIOR WALL: 0.95 CM (ref 0.6–1.1)
FRACTIONAL SHORTENING: 31 % (ref 28–44)
INTERVENTRICULAR SEPTUM: 0.95 CM (ref 0.6–1.1)
IVC DIAMETER: 1.06 CM
IVRT: 87.54 MSEC
LA MAJOR: 4.93 CM
LA MINOR: 4.95 CM
LA WIDTH: 4.1 CM
LEFT ATRIUM SIZE: 3.85 CM
LEFT ATRIUM VOLUME INDEX MOD: 31.5 ML/M2
LEFT ATRIUM VOLUME INDEX: 36.8 ML/M2
LEFT ATRIUM VOLUME MOD: 56.61 CM3
LEFT ATRIUM VOLUME: 66.28 CM3
LEFT INTERNAL DIMENSION IN SYSTOLE: 3.22 CM (ref 2.1–4)
LEFT VENTRICLE DIASTOLIC VOLUME INDEX: 55.37 ML/M2
LEFT VENTRICLE DIASTOLIC VOLUME: 99.66 ML
LEFT VENTRICLE MASS INDEX: 84 G/M2
LEFT VENTRICLE SYSTOLIC VOLUME INDEX: 23.1 ML/M2
LEFT VENTRICLE SYSTOLIC VOLUME: 41.52 ML
LEFT VENTRICULAR INTERNAL DIMENSION IN DIASTOLE: 4.65 CM (ref 3.5–6)
LEFT VENTRICULAR MASS: 150.75 G
LV LATERAL E/E' RATIO: 11.8 M/S
LV SEPTAL E/E' RATIO: 9.83 M/S
LVOT MG: 3.07 MMHG
LVOT MV: 0.84 CM/S
MV PEAK A VEL: 0.92 M/S
MV PEAK E VEL: 0.59 M/S
MV STENOSIS PRESSURE HALF TIME: 66.75 MS
MV VALVE AREA P 1/2 METHOD: 3.3 CM2
OHS CV RV/LV RATIO: 0.75 CM
PISA AR MAX VEL: 3.26 M/S
PISA MRMAX VEL: 4.51 M/S
PISA TR MAX VEL: 2.33 M/S
PULM VEIN S/D RATIO: 1.65
PV MEAN GRADIENT: 3 MMHG
PV PEAK D VEL: 0.37 M/S
PV PEAK GRADIENT: 7 MMHG
PV PEAK S VEL: 0.61 M/S
PV PEAK VELOCITY: 1.28 M/S
RA MAJOR: 4.3 CM
RA WIDTH: 3.5 CM
RIGHT VENTRICLE ID DIMENSION: 3.5 CM
RV TISSUE DOPPLER FREE WALL SYSTOLIC VELOCITY 1 (APICAL 4 CHAMBER VIEW): 11.62 CM/S
SINUS: 2.6 CM
STJ: 2.82 CM
TDI LATERAL: 0.05 M/S
TDI SEPTAL: 0.06 M/S
TDI: 0.06 M/S
TR MAX PG: 22 MMHG
TRICUSPID ANNULAR PLANE SYSTOLIC EXCURSION: 2.1 CM
Z-SCORE OF LEFT VENTRICULAR DIMENSION IN END DIASTOLE: -0.68
Z-SCORE OF LEFT VENTRICULAR DIMENSION IN END SYSTOLE: 0.36

## 2023-08-04 PROCEDURE — 93306 TTE W/DOPPLER COMPLETE: CPT | Mod: 26,,, | Performed by: INTERNAL MEDICINE

## 2023-08-04 PROCEDURE — 93306 ECHO (CUPID ONLY): ICD-10-PCS | Mod: 26,,, | Performed by: INTERNAL MEDICINE

## 2023-08-04 PROCEDURE — 77080 DXA BONE DENSITY AXIAL: CPT | Mod: 26,,, | Performed by: RADIOLOGY

## 2023-08-04 PROCEDURE — 93306 TTE W/DOPPLER COMPLETE: CPT

## 2023-08-04 PROCEDURE — 77080 DXA BONE DENSITY AXIAL SKELETON 1 OR MORE SITES: ICD-10-PCS | Mod: 26,,, | Performed by: RADIOLOGY

## 2023-08-04 PROCEDURE — 77080 DXA BONE DENSITY AXIAL: CPT | Mod: TC

## 2023-08-14 PROBLEM — E55.9 VITAMIN D DEFICIENCY: Status: ACTIVE | Noted: 2023-08-14

## 2023-08-14 PROBLEM — M21.612 BUNION, LEFT FOOT: Status: ACTIVE | Noted: 2023-08-14

## 2023-08-15 ENCOUNTER — PATIENT MESSAGE (OUTPATIENT)
Dept: ADMINISTRATIVE | Facility: OTHER | Age: 68
End: 2023-08-15
Payer: MEDICARE

## 2023-08-17 ENCOUNTER — TELEPHONE (OUTPATIENT)
Dept: INTERNAL MEDICINE | Facility: CLINIC | Age: 68
End: 2023-08-17
Payer: MEDICARE

## 2023-08-17 NOTE — TELEPHONE ENCOUNTER
----- Message from Unruly Ribeiro sent at 8/17/2023 11:55 AM CDT -----  Name of Who is Calling: WALLISCH, KRISTY [[id number]]           What is the request in detail: Patient is requesting a call back.  Patient states she would like to take medication for osteoporosis.  Please assist.        Pharmacy Location : [id number] [place] [address]           Can the clinic reply by [facility]: No           What Number to Call Back if not [facility]: [phone]

## 2023-08-25 ENCOUNTER — TELEPHONE (OUTPATIENT)
Dept: INTERNAL MEDICINE | Facility: CLINIC | Age: 68
End: 2023-08-25
Payer: MEDICARE

## 2023-08-25 DIAGNOSIS — I35.8 AORTIC VALVE SCLEROSIS: Primary | ICD-10-CM

## 2023-08-25 NOTE — TELEPHONE ENCOUNTER
----- Message from Leslie Carbajal MD sent at 8/25/2023  2:53 PM CDT -----  Please assist patient with scheduling cardiology appt, thank you!

## 2023-08-30 ENCOUNTER — OFFICE VISIT (OUTPATIENT)
Dept: CARDIOLOGY | Facility: CLINIC | Age: 68
End: 2023-08-30
Payer: MEDICARE

## 2023-08-30 VITALS — DIASTOLIC BLOOD PRESSURE: 77 MMHG | SYSTOLIC BLOOD PRESSURE: 145 MMHG | HEART RATE: 81 BPM

## 2023-08-30 DIAGNOSIS — I35.0 NONRHEUMATIC AORTIC VALVE STENOSIS: Primary | ICD-10-CM

## 2023-08-30 DIAGNOSIS — E78.2 MIXED HYPERLIPIDEMIA: ICD-10-CM

## 2023-08-30 DIAGNOSIS — I10 ESSENTIAL HYPERTENSION: ICD-10-CM

## 2023-08-30 PROCEDURE — 99204 OFFICE O/P NEW MOD 45 MIN: CPT | Mod: S$PBB,,, | Performed by: INTERNAL MEDICINE

## 2023-08-30 PROCEDURE — 99999 PR PBB SHADOW E&M-EST. PATIENT-LVL III: ICD-10-PCS | Mod: PBBFAC,,, | Performed by: INTERNAL MEDICINE

## 2023-08-30 PROCEDURE — 99204 PR OFFICE/OUTPT VISIT, NEW, LEVL IV, 45-59 MIN: ICD-10-PCS | Mod: S$PBB,,, | Performed by: INTERNAL MEDICINE

## 2023-08-30 PROCEDURE — 99213 OFFICE O/P EST LOW 20 MIN: CPT | Mod: PBBFAC | Performed by: INTERNAL MEDICINE

## 2023-08-30 PROCEDURE — 99999 PR PBB SHADOW E&M-EST. PATIENT-LVL III: CPT | Mod: PBBFAC,,, | Performed by: INTERNAL MEDICINE

## 2023-08-30 RX ORDER — ATORVASTATIN CALCIUM 80 MG/1
80 TABLET, FILM COATED ORAL DAILY
Qty: 90 TABLET | Refills: 3 | Status: SHIPPED | OUTPATIENT
Start: 2023-08-30

## 2023-08-30 NOTE — PROGRESS NOTES
HISTORY:    68 yo F h/o hypertension and hyperlipidemia presenting for initial evaluation by me.    The patient denies any symptoms of chest pain, shortness of breath, or dyspnea on exertion.    Activity levels are moderate. Gardens, walks, and completes ADLs without issue.    The patient denies any previous history of myocardial infarction, coronary artery disease, peripheral arterial disease, stroke, congestive heart failure, or cardiomyopathy.    Tolerates aspirin 81 x 1, amlodipine 10 x 1, hydrochlorothiazide 12.5 x 1, and atorvastatin 40x1.    PHYSICAL EXAM:    Vitals:    08/30/23 1500   BP: (!) 145/77   Pulse: 81       NAD, A+Ox3.  No jvd, no bruit.  RRR nml s1,s2. 2/6 AS murmur.  CTA B no wheezes or crackles.  No edema.    LABS/STUDIES (imaging reviewed during clinic visit):    November 2022 CBC and CMP normal.  LDL 77 and HDL 69.  Triglycerides 68.  EKG today in 2021 demonstrates sinus rhythm with no Q-waves or ST changes.  TTE August 2023 normal LV size and function with an EF of 55 60%.  Mild aortic stenosis with a peak velocity of 2.5 m/sec.  Mild AI.  Mild MR.    CT chest 2022 normal heart size.  Mild aortic atherosclerotic.    ASSESSMENT & PLAN:    1. Nonrheumatic aortic valve stenosis    2. Essential hypertension    3. Mixed hyperlipidemia    4. Mixed hyperlipidemia        Orders Placed This Encounter    atorvastatin (LIPITOR) 80 MG tablet        Mild, asymptomatic AS. Repeat TTE at follow-up in 1 year.    Bps controlled on amlodipine/hctz.    LDL 77. Will increase atorvastatin to 80x1.     Follow up in about 1 year (around 8/30/2024).      Suzanne Noe MD

## 2023-08-31 ENCOUNTER — PATIENT MESSAGE (OUTPATIENT)
Dept: INTERNAL MEDICINE | Facility: CLINIC | Age: 68
End: 2023-08-31
Payer: MEDICARE

## 2023-09-11 ENCOUNTER — ANESTHESIA EVENT (OUTPATIENT)
Dept: ENDOSCOPY | Facility: HOSPITAL | Age: 68
End: 2023-09-11
Payer: MEDICARE

## 2023-09-11 ENCOUNTER — HOSPITAL ENCOUNTER (OUTPATIENT)
Facility: HOSPITAL | Age: 68
Discharge: HOME | End: 2023-09-11
Attending: COLON & RECTAL SURGERY | Admitting: COLON & RECTAL SURGERY
Payer: MEDICARE

## 2023-09-11 ENCOUNTER — ANESTHESIA (OUTPATIENT)
Dept: ENDOSCOPY | Facility: HOSPITAL | Age: 68
End: 2023-09-11
Payer: MEDICARE

## 2023-09-11 VITALS
HEART RATE: 77 BPM | SYSTOLIC BLOOD PRESSURE: 117 MMHG | DIASTOLIC BLOOD PRESSURE: 59 MMHG | RESPIRATION RATE: 16 BRPM | HEIGHT: 64 IN | BODY MASS INDEX: 26.46 KG/M2 | TEMPERATURE: 98 F | OXYGEN SATURATION: 99 % | WEIGHT: 155 LBS

## 2023-09-11 DIAGNOSIS — R19.5 POSITIVE COLORECTAL CANCER SCREENING USING COLOGUARD TEST: Primary | ICD-10-CM

## 2023-09-11 PROCEDURE — E9220 PRA ENDO ANESTHESIA: HCPCS | Mod: PT,,, | Performed by: NURSE ANESTHETIST, CERTIFIED REGISTERED

## 2023-09-11 PROCEDURE — 27201012 HC FORCEPS, HOT/COLD, DISP: Performed by: COLON & RECTAL SURGERY

## 2023-09-11 PROCEDURE — 25000003 PHARM REV CODE 250: Performed by: COLON & RECTAL SURGERY

## 2023-09-11 PROCEDURE — 88305 TISSUE EXAM BY PATHOLOGIST: CPT | Performed by: STUDENT IN AN ORGANIZED HEALTH CARE EDUCATION/TRAINING PROGRAM

## 2023-09-11 PROCEDURE — E9220 PRA ENDO ANESTHESIA: ICD-10-PCS | Mod: PT,,, | Performed by: NURSE ANESTHETIST, CERTIFIED REGISTERED

## 2023-09-11 PROCEDURE — 45380 COLONOSCOPY AND BIOPSY: CPT | Mod: PT,,, | Performed by: COLON & RECTAL SURGERY

## 2023-09-11 PROCEDURE — 88305 TISSUE EXAM BY PATHOLOGIST: ICD-10-PCS | Mod: 26,,, | Performed by: STUDENT IN AN ORGANIZED HEALTH CARE EDUCATION/TRAINING PROGRAM

## 2023-09-11 PROCEDURE — 88305 TISSUE EXAM BY PATHOLOGIST: CPT | Mod: 26,,, | Performed by: STUDENT IN AN ORGANIZED HEALTH CARE EDUCATION/TRAINING PROGRAM

## 2023-09-11 PROCEDURE — 25000003 PHARM REV CODE 250: Performed by: NURSE ANESTHETIST, CERTIFIED REGISTERED

## 2023-09-11 PROCEDURE — 45380 PR COLONOSCOPY,BIOPSY: ICD-10-PCS | Mod: PT,,, | Performed by: COLON & RECTAL SURGERY

## 2023-09-11 PROCEDURE — 63600175 PHARM REV CODE 636 W HCPCS: Performed by: NURSE ANESTHETIST, CERTIFIED REGISTERED

## 2023-09-11 PROCEDURE — 45380 COLONOSCOPY AND BIOPSY: CPT | Mod: PT | Performed by: COLON & RECTAL SURGERY

## 2023-09-11 PROCEDURE — 37000009 HC ANESTHESIA EA ADD 15 MINS: Performed by: COLON & RECTAL SURGERY

## 2023-09-11 PROCEDURE — 37000008 HC ANESTHESIA 1ST 15 MINUTES: Performed by: COLON & RECTAL SURGERY

## 2023-09-11 RX ORDER — SODIUM CHLORIDE 9 MG/ML
INJECTION, SOLUTION INTRAVENOUS CONTINUOUS
Status: DISCONTINUED | OUTPATIENT
Start: 2023-09-11 | End: 2023-09-11 | Stop reason: HOSPADM

## 2023-09-11 RX ORDER — PROPOFOL 10 MG/ML
VIAL (ML) INTRAVENOUS
Status: DISCONTINUED | OUTPATIENT
Start: 2023-09-11 | End: 2023-09-11

## 2023-09-11 RX ORDER — PROPOFOL 10 MG/ML
VIAL (ML) INTRAVENOUS CONTINUOUS PRN
Status: DISCONTINUED | OUTPATIENT
Start: 2023-09-11 | End: 2023-09-11

## 2023-09-11 RX ORDER — LIDOCAINE HYDROCHLORIDE 20 MG/ML
INJECTION INTRAVENOUS
Status: DISCONTINUED | OUTPATIENT
Start: 2023-09-11 | End: 2023-09-11

## 2023-09-11 RX ADMIN — PROPOFOL 150 MCG/KG/MIN: 10 INJECTION, EMULSION INTRAVENOUS at 11:09

## 2023-09-11 RX ADMIN — LIDOCAINE HYDROCHLORIDE 50 MG: 20 INJECTION INTRAVENOUS at 11:09

## 2023-09-11 RX ADMIN — SODIUM CHLORIDE: 0.9 INJECTION, SOLUTION INTRAVENOUS at 10:09

## 2023-09-11 RX ADMIN — PROPOFOL 70 MG: 10 INJECTION, EMULSION INTRAVENOUS at 11:09

## 2023-09-11 NOTE — ANESTHESIA PREPROCEDURE EVALUATION
09/11/2023  Kristy Wallisch is a 67 y.o., female.    Procedure Summary    Case: [id number] Date/Time: 09/11/23 1040   Procedure: COLONOSCOPY (Abdomen) - 7/6- referred by Leslie Carvajal MD/ Prep instrutions to [facility]. AS   9/5 precall completed   Anesthesia type: Choice   Diagnosis:        Positive colorectal cancer screening using Cologuard test [R19.5]       Screening for colorectal cancer [Z12.11, Z12.12]     Patient Active Problem List   Diagnosis    Posterior vitreous detachment, right    NS (nuclear sclerosis), bilateral    Left sided sciatica    Benign essential tremor    Psoriasis    Essential hypertension    Light cigarette smoker (1-9 cigs/day)    Family history of breast cancer    Mixed hyperlipidemia    Osteopenia    Solar lentigo    Vitamin D deficiency    Bunion, left foot     Past Surgical History:   Procedure Laterality Date    TONSILLECTOMY  1960     Current Discharge Medication List      CONTINUE these medications which have NOT CHANGED    Details   amLODIPine (NORVASC) 10 MG tablet TAKE 1 TABLET(10 MG) BY MOUTH EVERY DAY  Qty: 90 tablet, Refills: 1    Associated Diagnoses: Essential hypertension      ascorbic acid, vitamin C, (VITAMIN C) 500 MG tablet Take by mouth. 1 Tablet Oral Every day      aspirin 81 MG Chew Take by mouth. 1 Tablet, Chewable Oral Every day      atorvastatin (LIPITOR) 80 MG tablet Take 1 tablet (80 mg total) by mouth once daily.  Qty: 90 tablet, Refills: 3    Comments: Please inactivate all prior scripts with same name and strength including on holds.  Associated Diagnoses: Mixed hyperlipidemia      calcium citrate-vitamin D3 315-200 mg (CITRACAL+D) 315-200 mg-unit per tablet Take 1 tablet by mouth 2 (two) times daily.      calcium phosphate/vitamin D2 (CALCIUM-VITAMIN D2 ORAL)       GLUCOSAM HCL/MSM/CHONDRO MONTE A (GLUCOSAMINE HCL-MSM-CHONDROITN) 375-250-300 mg  Tab Take by mouth. 1 Tablet Oral Every day      hydroCHLOROthiazide (HYDRODIURIL) 12.5 MG Tab TAKE 1 TABLET(12.5 MG) BY MOUTH EVERY DAY  Qty: 90 tablet, Refills: 1    Associated Diagnoses: Essential hypertension; Annual physical exam      meloxicam (MOBIC) 15 MG tablet Take 1 tablet (15 mg total) by mouth once daily.  Qty: 30 tablet, Refills: 0      multivitamin (THERAGRAN) per tablet Take by mouth. 1 Tablet Oral Every day             Pre-op Assessment    I have reviewed the Patient Summary Reports.     I have reviewed the Nursing Notes. I have reviewed the NPO Status.   I have reviewed the Medications.     Review of Systems  Anesthesia Hx:  Denies Family Hx of Anesthesia complications.   Denies Personal Hx of Anesthesia complications.   Social:  Smoker, Social Alcohol Use    Cardiovascular:   Hypertension    Pulmonary:  Pulmonary Normal        Physical Exam    Airway:  Mallampati: II / II  Mouth Opening: Normal  TM Distance: Normal  Tongue: Normal  Neck ROM: Normal ROM    Dental:  Intact        Anesthesia Plan  Type of Anesthesia, risks & benefits discussed:    Anesthesia Type: Gen Natural Airway  Intra-op Monitoring Plan: Standard ASA Monitors  Induction:  IV  Informed Consent: Informed consent signed with the Patient and all parties understand the risks and agree with anesthesia plan.  All questions answered.   ASA Score: 2    Ready For Surgery From Anesthesia Perspective.     .

## 2023-09-11 NOTE — H&P
COLONOSCOPY HISTORY AND PHYSICAL EXAM    Procedure : Colonoscopy      INDICATIONS: + Cologuard    Family Hx of CRC: None    Last Colonoscopy:  None  Findings: n/a       Past Medical History:   Diagnosis Date    Benign essential tremor     Cataract     HTN (hypertension)     Leg laceration, right, subsequent encounter 02/20/2019    Posterior vitreous detachment of right eye     Psoriasis      Sedation Problems: NO  Family History   Problem Relation Age of Onset    Essential Tremor Mother     Hypertension Mother     Arthritis Mother     Diabetes Mother     Stroke Mother     Hypertension Father     Heart disease Father     Hyperlipidemia Father     Alcohol abuse Father     Diabetes Father     Stroke Father     Essential Tremor Brother     Alcohol abuse Brother     Arthritis Brother     Diabetes Brother     Heart disease Brother     Breast cancer Paternal Grandmother     Breast cancer Paternal Aunt     Melanoma Neg Hx     Psoriasis Neg Hx     Lupus Neg Hx     Eczema Neg Hx     Colon cancer Neg Hx     Ovarian cancer Neg Hx     Osteoporosis Neg Hx      Fam Hx of Sedation Problems: NO  Social History     Socioeconomic History    Marital status:    Occupational History    Occupation: [employer]     Employer: [employer]   Tobacco Use    Smoking status: Every Day     Current packs/day: 0.50     Average packs/day: 0.5 packs/day for 49.7 years (24.8 ttl pk-yrs)     Types: Cigarettes     Start date: 1/1/1974    Smokeless tobacco: Never   Substance and Sexual Activity    Alcohol use: Yes     Alcohol/week: 7.0 standard drinks of alcohol     Types: 5 Glasses of wine, 2 Drinks containing 0.5 oz of alcohol per week     Comment: weekends    Drug use: No    Sexual activity: Yes     Partners: Male     Birth control/protection: Post-menopausal   Other Topics Concern    Are you pregnant or think you may be? No    Breast-feeding No     Social Determinants of Health     Financial Resource Strain: Low Risk  (6/8/2023)     "Overall Financial Resource Strain (CARDIA)     Difficulty of Paying Living Expenses: Not hard at all   Food Insecurity: No Food Insecurity (6/8/2023)    Hunger Vital Sign     Worried About Running Out of Food in the Last Year: Never true     Ran Out of Food in the Last Year: Never true   Transportation Needs: No Transportation Needs (6/8/2023)    PRAPARE - Transportation     Lack of Transportation (Medical): No     Lack of Transportation (Non-Medical): No   Physical Activity: Sufficiently Active (6/8/2023)    Exercise Vital Sign     Days of Exercise per Week: 5 days     Minutes of Exercise per Session: 30 min   Stress: No Stress Concern Present (6/8/2023)    [employer] - Occupational Stress Questionnaire     Feeling of Stress : Only a little   Social Connections: Unknown (6/8/2023)    Social Connection and Isolation Panel [NHANES]     Frequency of Communication with Friends and Family: More than three times a week     Frequency of Social Gatherings with Friends and Family: Three times a week     Active Member of Clubs or Organizations: Yes     Attends Club or Organization Meetings: More than 4 times per year     Marital Status:    Housing Stability: Low Risk  (6/8/2023)    Housing Stability Vital Sign     Unable to Pay for Housing in the Last Year: No     Number of Places Lived in the Last Year: 1     Unstable Housing in the Last Year: No       Review of Systems - Negative except   Respiratory ROS: no dyspnea  Cardiovascular ROS: no exertional chest pain  Gastrointestinal ROS: NO abdominal discomfort,  NO rectal bleeding  Musculoskeletal ROS: no muscular pain  Neurological ROS: no recent stroke    Physical Exam:  /62 (BP Location: Left arm, Patient Position: Lying)   Pulse 89   Temp 98.1 °F (36.7 °C) (Oral)   Resp 16   Ht 5' 4" (1.626 m)   Wt 70.3 kg (155 lb)   SpO2 96%   Breastfeeding No   BMI 26.61 kg/m²   General: no distress  Head: normocephalic  Mallampati " Score   Neck: supple, symmetrical, trachea midline  Lungs:  clear to auscultation bilaterally and normal respiratory effort  Heart: regular rate and rhythm and no murmur  Abdomen: soft, non-tender non-distented; bowel sounds normal; no masses,  no organomegaly  Extremities: no cyanosis or edema, or clubbing    ASA:  II    PLAN  COLONOSCOPY.    SedationPlan :MAC    The details of the procedure, the possible need for biopsy or polypectomy and the potential risks including bleeding, perforation, missed polyps were discussed in detail.

## 2023-09-11 NOTE — ANESTHESIA POSTPROCEDURE EVALUATION
Anesthesia Post Evaluation    Patient: Kristy Wallisch    Procedure(s) Performed: Procedure(s) (LRB):  COLONOSCOPY (N/A)    Final Anesthesia Type: general      Patient location during evaluation: PACU  Patient participation: Yes- Able to Participate  Level of consciousness: awake and alert and oriented  Post-procedure vital signs: reviewed and stable  Pain management: adequate  Airway patency: patent    PONV status at discharge: No PONV  Anesthetic complications: no      Cardiovascular status: hemodynamically stable  Respiratory status: nasal cannula  Hydration status: euvolemic  Follow-up not needed.          Vitals Value Taken Time   /59 09/11/23 1227   Temp 36.5 °C (97.7 °F) 09/11/23 1157   Pulse 77 09/11/23 1227   Resp 16 09/11/23 1227   SpO2 99 % 09/11/23 1227         Event Time   Out of Recovery 12:29:23         Pain/Daija Score: Daija Score: 9 (9/11/2023 11:58 AM)

## 2023-09-11 NOTE — PROVATION PATIENT INSTRUCTIONS
Discharge Summary/Instructions after an Endoscopic Procedure  Patient Name: Kristy Wallisch  Patient MRN: [id number]  Patient YOB: 1955 Monday, September 11, 2023  Stacey Chavez MD  Dear patient,  As a result of recent federal legislation (The Federal Cures Act), you may   receive lab or pathology results from your procedure in your [facility]   account before your physician is able to contact you. Your physician or   their representative will relay the results to you with their   recommendations at their soonest availability.  Thank you,  RESTRICTIONS:  During your procedure today, you received medications for sedation.  These   medications may affect your judgment, balance and coordination.  Therefore,   for 24 hours, you have the following restrictions:   - DO NOT drive a car, operate machinery, make legal/financial decisions,   sign important papers or drink alcohol.    ACTIVITY:  Today: no heavy lifting, straining or running due to procedural   sedation/anesthesia.  The following day: return to full activity including work.  DIET:  Eat and drink normally unless instructed otherwise.     TREATMENT FOR COMMON SIDE EFFECTS:  - Mild abdominal pain, nausea, belching, bloating or excessive gas:  rest,   eat lightly and use a heating pad.  - Sore Throat: treat with throat lozenges and/or gargle with warm salt   water.  - Because air was used during the procedure, expelling large amounts of air   from your rectum or belching is normal.  - If a bowel prep was taken, you may not have a bowel movement for 1-3 days.    This is normal.  SYMPTOMS TO WATCH FOR AND REPORT TO YOUR PHYSICIAN:  1. Abdominal pain or bloating, other than gas cramps.  2. Chest pain.  3. Back pain.  4. Signs of infection such as: chills or fever occurring within 24 hours   after the procedure.  5. Rectal bleeding, which would show as bright red, maroon, or black stools.   (A tablespoon of blood from the rectum is not serious,  especially if   hemorrhoids are present.)  6. Vomiting.  7. Weakness or dizziness.  GO DIRECTLY TO THE NEAREST EMERGENCY ROOM IF YOU HAVE ANY OF THE FOLLOWING:      Difficulty breathing              Chills and/or fever over 101 F   Persistent vomiting and/or vomiting blood   Severe abdominal pain   Severe chest pain   Black, tarry stools   Bleeding- more than one tablespoon   Any other symptom or condition that you feel may need urgent attention  Your doctor recommends these additional instructions:  If any biopsies were taken, your doctors clinic will contact you in 1 to 2   weeks with any results.  - Discharge patient to home.   - Resume previous diet.   - Continue present medications.   - Await pathology results.   - Repeat colonoscopy date to be determined after pending pathology results   are reviewed for surveillance.   - Written discharge instructions were provided to the patient.   - The signs and symptoms of potential delayed complications were discussed   with the patient.   - Patient has a contact number available for emergencies.   - Return to normal activities tomorrow.  For questions, problems or results please call your physician - Stacey Chavez MD at Work:  [phone].  [facility], EMERGENCY ROOM PHONE NUMBER: [phone]  IF A COMPLICATION OR EMERGENCY SITUATION ARISES AND YOU ARE UNABLE TO REACH   YOUR PHYSICIAN - GO DIRECTLY TO THE EMERGENCY ROOM.  Stacey Chavez MD  9/11/2023 11:54:57 AM  This report has been verified and signed electronically.  Dear patient,  As a result of recent federal legislation (The Federal Cures Act), you may   receive lab or pathology results from your procedure in your [facility]   account before your physician is able to contact you. Your physician or   their representative will relay the results to you with their   recommendations at their soonest availability.  Thank you,  PROVATION

## 2023-09-11 NOTE — TRANSFER OF CARE
"Anesthesia Transfer of Care Note    Patient: Kristy Wallisch    Procedure(s) Performed: Procedure(s) (LRB):  COLONOSCOPY (N/A)    Patient location: GI    Anesthesia Type: general    Transport from OR: Transported from OR on room air with adequate spontaneous ventilation    Post pain: adequate analgesia    Post assessment: no apparent anesthetic complications and tolerated procedure well    Post vital signs: stable    Level of consciousness: awake, alert and oriented    Nausea/Vomiting: no nausea/vomiting    Complications: none    Transfer of care protocol was followed      Last vitals:   Visit Vitals  BP (!) 112/49   Pulse 84   Temp 36.7 °C (98.1 °F) (Oral)   Resp 16   Ht 5' 4" (1.626 m)   Wt 70.3 kg (155 lb)   SpO2 98%   Breastfeeding No   BMI 26.61 kg/m²     "

## 2023-09-14 LAB
FINAL PATHOLOGIC DIAGNOSIS: NORMAL
GROSS: NORMAL
Lab: NORMAL

## 2023-09-15 ENCOUNTER — PATIENT MESSAGE (OUTPATIENT)
Dept: INTERNAL MEDICINE | Facility: CLINIC | Age: 68
End: 2023-09-15
Payer: MEDICARE

## 2023-09-26 ENCOUNTER — TELEPHONE (OUTPATIENT)
Dept: INTERNAL MEDICINE | Facility: CLINIC | Age: 68
End: 2023-09-26
Payer: MEDICARE

## 2023-09-26 ENCOUNTER — PATIENT OUTREACH (OUTPATIENT)
Dept: ADMINISTRATIVE | Facility: HOSPITAL | Age: 68
End: 2023-09-26
Payer: MEDICARE

## 2023-09-26 ENCOUNTER — OFFICE VISIT (OUTPATIENT)
Dept: INTERNAL MEDICINE | Facility: CLINIC | Age: 68
End: 2023-09-26
Payer: MEDICARE

## 2023-09-26 ENCOUNTER — PATIENT MESSAGE (OUTPATIENT)
Dept: ADMINISTRATIVE | Facility: HOSPITAL | Age: 68
End: 2023-09-26
Payer: MEDICARE

## 2023-09-26 VITALS — SYSTOLIC BLOOD PRESSURE: 135 MMHG | DIASTOLIC BLOOD PRESSURE: 65 MMHG

## 2023-09-26 DIAGNOSIS — M85.80 OSTEOPENIA, UNSPECIFIED LOCATION: Primary | ICD-10-CM

## 2023-09-26 PROCEDURE — 99214 OFFICE O/P EST MOD 30 MIN: CPT | Mod: 95,,, | Performed by: PHYSICIAN ASSISTANT

## 2023-09-26 PROCEDURE — 99214 PR OFFICE/OUTPT VISIT, EST, LEVL IV, 30-39 MIN: ICD-10-PCS | Mod: 95,,, | Performed by: PHYSICIAN ASSISTANT

## 2023-09-26 RX ORDER — ALENDRONATE SODIUM 70 MG/1
70 TABLET ORAL
Qty: 4 TABLET | Refills: 11 | Status: SHIPPED | OUTPATIENT
Start: 2023-09-26 | End: 2024-09-25

## 2023-09-26 NOTE — PROGRESS NOTES
INTERNAL MEDICINE PROGRESS NOTE    CHIEF COMPLAINT     Chief Complaint   Patient presents with    Medication Problem       HPI     Kristy M Wallisch is a 67 y.o. female who presents for a follow-up visit today.    PCP is Leslie Carbajal MD, patient is new to me.     Following up bone density test on 8/4/2023 shows osteopenia with increased risk scores. She denies any falls or bone/joint pain. No history of GERD. Amenable to starting bisphosphonate.   She is at home during this VV  Face Time is 10 mins.       Past Medical History:  Past Medical History:   Diagnosis Date    Benign essential tremor     Cataract     HTN (hypertension)     Leg laceration, right, subsequent encounter 02/20/2019    Posterior vitreous detachment of right eye     Psoriasis        Home Medications:  Prior to Admission medications    Medication Sig Start Date End Date Taking? Authorizing Provider   alendronate (FOSAMAX) 70 MG tablet Take 1 tablet (70 mg total) by mouth every 7 days. 9/26/23 9/25/24  Drea Sena PA-C   amLODIPine (NORVASC) 10 MG tablet TAKE 1 TABLET(10 MG) BY MOUTH EVERY DAY 6/11/23   Monet Nazario MD   ascorbic acid, vitamin C, (VITAMIN C) 500 MG tablet Take by mouth. 1 Tablet Oral Every day    Provider, Historical   aspirin 81 MG Chew Take by mouth. 1 Tablet, Chewable Oral Every day    Provider, Historical   atorvastatin (LIPITOR) 80 MG tablet Take 1 tablet (80 mg total) by mouth once daily. 8/30/23   Suzanne Noe MD   calcium citrate-vitamin D3 315-200 mg (CITRACAL+D) 315-200 mg-unit per tablet Take 1 tablet by mouth 2 (two) times daily.    Provider, Historical   calcium phosphate/vitamin D2 (CALCIUM-VITAMIN D2 ORAL)     Provider, Historical   GLUCOSAM HCL/MSM/CHONDRO MONTE A (GLUCOSAMINE HCL-MSM-CHONDROITN) 375-250-300 mg Tab Take by mouth. 1 Tablet Oral Every day    Provider, Historical   hydroCHLOROthiazide (HYDRODIURIL) 12.5 MG Tab TAKE 1 TABLET(12.5 MG) BY MOUTH EVERY DAY 6/11/23   Monet Nazario MD    meloxicam (MOBIC) 15 MG tablet Take 1 tablet (15 mg total) by mouth once daily. 6/27/23   Lloyd Wilder, DPM   multivitamin (THERAGRAN) per tablet Take by mouth. 1 Tablet Oral Every day    Provider, Historical       Review of Systems:  Review of Systems   Constitutional:  Negative for activity change and unexpected weight change.   HENT:  Negative for hearing loss, rhinorrhea and trouble swallowing.    Eyes:  Negative for discharge and visual disturbance.   Respiratory:  Negative for chest tightness and wheezing.    Cardiovascular:  Negative for chest pain and palpitations.   Gastrointestinal:  Negative for blood in stool, constipation, diarrhea and vomiting.   Endocrine: Negative for polydipsia and polyuria.   Genitourinary:  Negative for difficulty urinating, dysuria, hematuria and menstrual problem.   Musculoskeletal:  Negative for arthralgias, joint swelling and neck pain.   Neurological:  Negative for weakness and headaches.   Psychiatric/Behavioral:  Negative for confusion and dysphoric mood.        Health Maintainence:   Immunizations:  Health Maintenance         Date Due Completion Date    COVID-19 Vaccine (6 - Pfizer series) 02/06/2023 10/6/2022    Influenza Vaccine (1) 09/01/2023 10/1/2022    Mammogram 12/12/2023 12/12/2022    Hemoglobin A1c (Prediabetes) 11/18/2023 11/18/2022    LDCT Lung Screen 12/29/2023 12/29/2022    Pneumococcal Vaccines (Age 65+) (3 - PPSV23 or PCV20) 03/28/2024 8/3/2020    TETANUS VACCINE 01/01/2026 1/1/2016 (Done)    Override on 1/1/2016: Done    DEXA Scan 08/04/2026 8/4/2023    Lipid Panel 11/18/2027 11/18/2022    Colorectal Cancer Screening 09/11/2030 9/11/2023             PHYSICAL EXAM     There were no vitals taken for this visit. VV    Physical Exam  Constitutional:       Comments: Healthy appearing female in NAD or apparent pain. She makes good eye contact, speaks in clear full sentences and pt does not ambulate during this exam.              LABS     Lab Results    Component Value Date    HGBA1C 5.4 11/18/2022     CMP  Sodium   Date Value Ref Range Status   11/18/2022 141 136 - 145 mmol/L Final     Potassium   Date Value Ref Range Status   11/18/2022 3.6 3.5 - 5.1 mmol/L Final     Chloride   Date Value Ref Range Status   11/18/2022 107 95 - 110 mmol/L Final     CO2   Date Value Ref Range Status   11/18/2022 26 23 - 29 mmol/L Final     Glucose   Date Value Ref Range Status   11/18/2022 102 70 - 110 mg/dL Final     BUN   Date Value Ref Range Status   11/18/2022 10 8 - 23 mg/dL Final     Creatinine   Date Value Ref Range Status   11/18/2022 0.7 0.5 - 1.4 mg/dL Final     Calcium   Date Value Ref Range Status   11/18/2022 9.0 8.7 - 10.5 mg/dL Final     Total Protein   Date Value Ref Range Status   11/18/2022 6.7 6.0 - 8.4 g/dL Final     Albumin   Date Value Ref Range Status   11/18/2022 3.9 3.5 - 5.2 g/dL Final     Total Bilirubin   Date Value Ref Range Status   11/18/2022 0.7 0.1 - 1.0 mg/dL Final     Comment:     For infants and newborns, interpretation of results should be based  on gestational age, weight and in agreement with clinical  observations.    Premature Infant recommended reference ranges:  Up to 24 hours.............<8.0 mg/dL  Up to 48 hours............<12.0 mg/dL  3-5 days..................<15.0 mg/dL  6-29 days.................<15.0 mg/dL       Alkaline Phosphatase   Date Value Ref Range Status   11/18/2022 88 55 - 135 U/L Final     AST   Date Value Ref Range Status   11/18/2022 23 10 - 40 U/L Final     ALT   Date Value Ref Range Status   11/18/2022 26 10 - 44 U/L Final     Anion Gap   Date Value Ref Range Status   11/18/2022 8 8 - 16 mmol/L Final     eGFR if    Date Value Ref Range Status   11/01/2021 >60 >60 mL/min/1.73 m^2 Final     eGFR if non    Date Value Ref Range Status   11/01/2021 >60 >60 mL/min/1.73 m^2 Final     Comment:     Calculation used to obtain the estimated glomerular filtration  rate (eGFR) is the CKD-EPI  equation.        Lab Results   Component Value Date    WBC 7.43 11/18/2022    HGB 15.1 11/18/2022    HCT 44.4 11/18/2022    MCV 95 11/18/2022     11/18/2022     Lab Results   Component Value Date    CHOL 160 11/18/2022    CHOL 139 11/01/2021    CHOL 143 07/21/2020     Lab Results   Component Value Date    HDL 69 11/18/2022    HDL 62 11/01/2021    HDL 56 07/21/2020     Lab Results   Component Value Date    LDLCALC 77.4 11/18/2022    LDLCALC 65.8 11/01/2021    LDLCALC 77.2 07/21/2020     Lab Results   Component Value Date    TRIG 68 11/18/2022    TRIG 56 11/01/2021    TRIG 49 07/21/2020     Lab Results   Component Value Date    CHOLHDL 43.1 11/18/2022    CHOLHDL 44.6 11/01/2021    CHOLHDL 39.2 07/21/2020     Lab Results   Component Value Date    TSH 1.98 09/09/2010       ASSESSMENT/PLAN     Kristy M Wallisch is a 67 y.o. female     Shanae was seen today for medication problem.    Diagnoses and all orders for this visit:    Osteopenia, unspecified location   -start Fosamax   -continue Ca and vit D supplements as previously prescribed by PCP    Other orders  -     alendronate (FOSAMAX) 70 MG tablet; Take 1 tablet (70 mg total) by mouth every 7 days.         Drea Sena PA-C   Department of Internal Medicine - [facility]   3:33 PM

## 2023-10-09 ENCOUNTER — PATIENT MESSAGE (OUTPATIENT)
Dept: ADMINISTRATIVE | Facility: HOSPITAL | Age: 68
End: 2023-10-09
Payer: MEDICARE

## 2023-10-11 ENCOUNTER — OFFICE VISIT (OUTPATIENT)
Dept: OPTOMETRY | Facility: CLINIC | Age: 68
End: 2023-10-11
Payer: COMMERCIAL

## 2023-10-11 DIAGNOSIS — H52.03 HYPEROPIA WITH ASTIGMATISM AND PRESBYOPIA, BILATERAL: Primary | ICD-10-CM

## 2023-10-11 DIAGNOSIS — H52.203 HYPEROPIA WITH ASTIGMATISM AND PRESBYOPIA, BILATERAL: Primary | ICD-10-CM

## 2023-10-11 DIAGNOSIS — H52.4 HYPEROPIA WITH ASTIGMATISM AND PRESBYOPIA, BILATERAL: Primary | ICD-10-CM

## 2023-10-11 DIAGNOSIS — H10.13 ALLERGIC CONJUNCTIVITIS OF BOTH EYES: ICD-10-CM

## 2023-10-11 DIAGNOSIS — G43.109 OCULAR MIGRAINE: ICD-10-CM

## 2023-10-11 DIAGNOSIS — H04.123 DRY EYE SYNDROME OF BOTH EYES: ICD-10-CM

## 2023-10-11 PROCEDURE — 92015 DETERMINE REFRACTIVE STATE: CPT | Mod: S$GLB,,, | Performed by: OPTOMETRIST

## 2023-10-11 PROCEDURE — 92014 PR EYE EXAM, EST PATIENT,COMPREHESV: ICD-10-PCS | Mod: S$GLB,,, | Performed by: OPTOMETRIST

## 2023-10-11 PROCEDURE — 92015 PR REFRACTION: ICD-10-PCS | Mod: S$GLB,,, | Performed by: OPTOMETRIST

## 2023-10-11 PROCEDURE — 99999 PR PBB SHADOW E&M-EST. PATIENT-LVL III: CPT | Mod: PBBFAC,,, | Performed by: OPTOMETRIST

## 2023-10-11 PROCEDURE — 92014 COMPRE OPH EXAM EST PT 1/>: CPT | Mod: S$GLB,,, | Performed by: OPTOMETRIST

## 2023-10-11 PROCEDURE — 99999 PR PBB SHADOW E&M-EST. PATIENT-LVL III: ICD-10-PCS | Mod: PBBFAC,,, | Performed by: OPTOMETRIST

## 2023-10-11 NOTE — PROGRESS NOTES
JC    SHERLEY: 10/22  Chief complaint (CC): Patient is here for annual eye exam.  Patient has   noticed watering when she's reading or outside.  Patient has also noticed   flashes I her periphery OU (lightening) for the past 2-3 years.  Distance   is not as clear as it used to be, near vision seems fine.  Glasses? + 2 yrs. old  Contacts? -  H/o eye surgery, injections or laser: -  H/o eye injury: -  Known eye conditions? See above  Family h/o eye conditions? Mother with AMD  Eye gtts? -      (+) Flashes-lightening (-)  Floaters (-) Mucous   (+)  Tearing (+) Itching (-) Burning   (-) Headaches (-) Eye Pain/discomfort (-) Irritation   (-)  Redness (-) Double vision (-) Blurry vision    Diabetic? -  A1c? -      Last edited by Phillip Dove, OD on 10/11/2023  3:59 PM.            Assessment /Plan     For exam results, see Encounter Report.      Hyperopia with astigmatism and presbyopia, bilateral  SRx released to patient. Patient educated on lens options. Normal ocular health. RTC 1 year for routine exam.     Ocular migraine  Educated pt. Monitor.      Allergic conjunctivitis of both eyes  Recommend Zaditor or Alaway bid OU and cool compresses to help soothe itching. Patient advised to RTC if condition gets worse.     Dry eye syndrome of both eyes  Recommend Systane Ultra or Refresh Relieva TId-QID OU to aid with symptoms of dry eyes.

## 2023-10-20 ENCOUNTER — PATIENT MESSAGE (OUTPATIENT)
Dept: INTERNAL MEDICINE | Facility: CLINIC | Age: 68
End: 2023-10-20
Payer: MEDICARE

## 2023-10-20 DIAGNOSIS — Z12.31 ENCOUNTER FOR SCREENING MAMMOGRAM FOR MALIGNANT NEOPLASM OF BREAST: ICD-10-CM

## 2023-10-20 DIAGNOSIS — Z12.31 SCREENING MAMMOGRAM FOR BREAST CANCER: Primary | ICD-10-CM

## 2023-10-20 DIAGNOSIS — Z80.3 FAMILY HISTORY OF BREAST CANCER: ICD-10-CM

## 2023-10-20 NOTE — TELEPHONE ENCOUNTER
Called and attempted to schedule patient for mammogram. Patient will try to self-schedule via [facility]. Informed patient if she is unsuccessful to give office a call to assist with scheduling. Patient verbalized understanding.

## 2023-12-08 DIAGNOSIS — I10 ESSENTIAL HYPERTENSION: ICD-10-CM

## 2023-12-08 DIAGNOSIS — Z00.00 ANNUAL PHYSICAL EXAM: ICD-10-CM

## 2023-12-11 ENCOUNTER — OFFICE VISIT (OUTPATIENT)
Dept: INTERNAL MEDICINE | Facility: CLINIC | Age: 68
End: 2023-12-11
Payer: MEDICARE

## 2023-12-11 ENCOUNTER — PATIENT MESSAGE (OUTPATIENT)
Dept: INTERNAL MEDICINE | Facility: CLINIC | Age: 68
End: 2023-12-11
Payer: MEDICARE

## 2023-12-11 VITALS
WEIGHT: 162.69 LBS | DIASTOLIC BLOOD PRESSURE: 67 MMHG | HEART RATE: 77 BPM | SYSTOLIC BLOOD PRESSURE: 147 MMHG | HEIGHT: 64 IN | BODY MASS INDEX: 27.77 KG/M2

## 2023-12-11 DIAGNOSIS — I10 ESSENTIAL HYPERTENSION: ICD-10-CM

## 2023-12-11 DIAGNOSIS — Z00.00 ANNUAL PHYSICAL EXAM: ICD-10-CM

## 2023-12-11 DIAGNOSIS — F17.210 NICOTINE DEPENDENCE, CIGARETTES, UNCOMPLICATED: ICD-10-CM

## 2023-12-11 DIAGNOSIS — E78.2 MIXED HYPERLIPIDEMIA: ICD-10-CM

## 2023-12-11 DIAGNOSIS — Z00.00 HEALTH MAINTENANCE EXAMINATION: Primary | ICD-10-CM

## 2023-12-11 DIAGNOSIS — E55.9 VITAMIN D DEFICIENCY: ICD-10-CM

## 2023-12-11 DIAGNOSIS — R73.09 OTHER ABNORMAL GLUCOSE: ICD-10-CM

## 2023-12-11 DIAGNOSIS — I35.0 AORTIC VALVE STENOSIS, ETIOLOGY OF CARDIAC VALVE DISEASE UNSPECIFIED: ICD-10-CM

## 2023-12-11 DIAGNOSIS — M81.0 AGE-RELATED OSTEOPOROSIS WITHOUT CURRENT PATHOLOGICAL FRACTURE: ICD-10-CM

## 2023-12-11 PROCEDURE — 99214 OFFICE O/P EST MOD 30 MIN: CPT | Mod: PBBFAC | Performed by: STUDENT IN AN ORGANIZED HEALTH CARE EDUCATION/TRAINING PROGRAM

## 2023-12-11 PROCEDURE — 99214 PR OFFICE/OUTPT VISIT, EST, LEVL IV, 30-39 MIN: ICD-10-PCS | Mod: S$PBB,,, | Performed by: STUDENT IN AN ORGANIZED HEALTH CARE EDUCATION/TRAINING PROGRAM

## 2023-12-11 PROCEDURE — 99999 PR PBB SHADOW E&M-EST. PATIENT-LVL IV: CPT | Mod: PBBFAC,,, | Performed by: STUDENT IN AN ORGANIZED HEALTH CARE EDUCATION/TRAINING PROGRAM

## 2023-12-11 PROCEDURE — 99214 OFFICE O/P EST MOD 30 MIN: CPT | Mod: S$PBB,,, | Performed by: STUDENT IN AN ORGANIZED HEALTH CARE EDUCATION/TRAINING PROGRAM

## 2023-12-11 PROCEDURE — 99999 PR PBB SHADOW E&M-EST. PATIENT-LVL IV: ICD-10-PCS | Mod: PBBFAC,,, | Performed by: STUDENT IN AN ORGANIZED HEALTH CARE EDUCATION/TRAINING PROGRAM

## 2023-12-11 RX ORDER — AMLODIPINE BESYLATE 10 MG/1
10 TABLET ORAL DAILY
Qty: 90 TABLET | Refills: 3 | Status: SHIPPED | OUTPATIENT
Start: 2023-12-11

## 2023-12-11 RX ORDER — AMLODIPINE BESYLATE 10 MG/1
TABLET ORAL
Qty: 90 TABLET | Refills: 1 | OUTPATIENT
Start: 2023-12-11

## 2023-12-11 RX ORDER — HYDROCHLOROTHIAZIDE 12.5 MG/1
TABLET ORAL
Qty: 90 TABLET | Refills: 1 | OUTPATIENT
Start: 2023-12-11

## 2023-12-11 RX ORDER — HYDROCHLOROTHIAZIDE 12.5 MG/1
12.5 TABLET ORAL DAILY
Qty: 90 TABLET | Refills: 3 | Status: SHIPPED | OUTPATIENT
Start: 2023-12-11

## 2023-12-11 NOTE — PROGRESS NOTES
Subjective:       Patient ID: Kristy M Wallisch is a 68 y.o. female.    Chief Complaint: Health maintenance examination [Z00.00]    Patient is established with me, here today for the following:    HTN, HLD, osteoporosis, vitamin D deficiency, essential tremor, mild aortic stenosis    Health maintenance -   Colonoscopy performed SEP2023. Told to repeat in 7 years.  Denies family history of colorectal cancer.  Mammogram BI-RADS 1 in DEC2022. Repeat scheduled at the end of this month.  Denies history of abnormal mammogram.  Family history of breast cancer.  Denies family history of ovarian cancer.  Completed pap screening.  Denies history of abnormal pap smear.  Family history of cardiac disease.  UTD on Tdap, COVID, PPSV23, PCV13, shingles, influenza vaccinations.  Due for RSV vaccinations.  Started smoking at age 24, at most 1 PPD. Currently smoking <0.5 PPD.  Drinks alcohol 5-7 times weekly, 1 drinks per sitting.  Denies current drug use.  Low dose lung CT scan was LUNG-RADS 2 in DEC2022. Due for repeat.  Completed HIV and hepatitis C screening.  Due for diabetes screening.  Lab Results       Component                Value               Date                       HGBA1C                   5.4                 11/18/2022            Endorses overall healthy diet.   Eating plenty of fresh vegetables.  Eats mostly at home.  Endorses active lifestyle.     HTN -   Currently prescribed amlodipine, HCTZ.  Patient endorses taking medication as directed.  Denies side effects or concerns while taking medication.  Due for micro albumin creatinine ratio.   BP Readings from Last 5 Encounters:  09/26/23 : 135/65  09/11/23 : (!) 117/59  08/30/23 : (!) 145/77  08/04/23 : (!) 148/68  06/27/23 : (!) 148/68     HLD -   Endorses taking atorvastatin as directed  Denies side effects or concerns while taking medication  Lab Results       Component                Value               Date                       CHOL                     160                  11/18/2022            Lab Results       Component                Value               Date                       TRIG                     68                  11/18/2022            Lab Results       Component                Value               Date                       LDLCALC                  77.4                11/18/2022            Lab Results       Component                Value               Date                       HDL                      69                  11/18/2022            Due for lipid recheck.         Osteoporosis -   Vitamin D deficiency -   Completed DEXA in AUG2023.  Showed osteopenia.  Ten year fracture probability:  Major osteoporotic 13.1%, hip 4%  Denies family history of osteoporosis.  Currently taking vitamin D3 daily.  Currently taking calcium daily.  Currently taking alendronate. Started medication in SEP2023.  Lab Results       Component                Value               Date                       [id number]             38                  11/18/2022                 [id number]             48                  11/01/2021                 [id number]             43                  07/21/2020              Mild aortic stenosis -   Following with Dr. Noe for cardiology.  Plans for repeat echo and follow up appt AUG2024          Review of Systems   Constitutional:  Negative for activity change, fatigue and fever.   Respiratory:  Negative for cough and shortness of breath.    Cardiovascular:  Negative for chest pain and palpitations.   Gastrointestinal:  Negative for abdominal pain, constipation, diarrhea, nausea and vomiting.   Neurological:  Negative for syncope and headaches.         Current Outpatient Medications   Medication Instructions    alendronate (FOSAMAX) 70 mg, Oral, Every 7 days    amLODIPine (NORVASC) 10 MG tablet TAKE 1 TABLET(10 MG) BY MOUTH EVERY DAY    ascorbic acid, vitamin C, (VITAMIN C) 500 MG tablet Take by mouth. 1 Tablet Oral Every day    aspirin 81 MG  "Chew Take by mouth. 1 Tablet, Chewable Oral Every day    atorvastatin (LIPITOR) 80 mg, Oral, Daily    calcium citrate-vitamin D3 315-200 mg (CITRACAL+D) 315-200 mg-unit per tablet 1 tablet, Oral, 2 times daily    calcium phosphate/vitamin D2 (CALCIUM-VITAMIN D2 ORAL) No dose, route, or frequency recorded.    GLUCOSAM HCL/MSM/CHONDRO MONTE A (GLUCOSAMINE HCL-MSM-CHONDROITN) 375-250-300 mg Tab Take by mouth. 1 Tablet Oral Every day    hydroCHLOROthiazide (HYDRODIURIL) 12.5 MG Tab TAKE 1 TABLET(12.5 MG) BY MOUTH EVERY DAY    multivitamin (THERAGRAN) per tablet Take by mouth. 1 Tablet Oral Every day     Objective:      Vitals:    12/11/23 1423   BP: (!) 147/67   Pulse: 77   Weight: 73.8 kg (162 lb 11.2 oz)   Height: 5' 4" (1.626 m)   PainSc: 0-No pain     Body mass index is 27.93 kg/m².    Physical Exam  Vitals reviewed.   Constitutional:       General: She is not in acute distress.     Appearance: Normal appearance. She is not ill-appearing or diaphoretic.   HENT:      Head: Normocephalic and atraumatic.      Right Ear: Tympanic membrane, ear canal and external ear normal. There is no impacted cerumen.      Left Ear: Tympanic membrane, ear canal and external ear normal. There is no impacted cerumen.      Nose: Nose normal. No rhinorrhea.      Mouth/Throat:      Mouth: Mucous membranes are moist.      Pharynx: Oropharynx is clear. No oropharyngeal exudate or posterior oropharyngeal erythema.   Eyes:      General: No scleral icterus.        Right eye: No discharge.         Left eye: No discharge.      Conjunctiva/sclera: Conjunctivae normal.   Neck:      Thyroid: No thyromegaly or thyroid tenderness.      Trachea: Trachea normal.   Cardiovascular:      Rate and Rhythm: Normal rate and regular rhythm.      Heart sounds: Murmur heard.      Systolic murmur is present with a grade of 3/6.      No friction rub. No gallop.   Pulmonary:      Effort: Pulmonary effort is normal. No respiratory distress.      Breath sounds: Normal " breath sounds. No stridor. No wheezing, rhonchi or rales.   Abdominal:      General: There is no distension.      Palpations: Abdomen is soft.      Tenderness: There is no abdominal tenderness. There is no guarding or rebound.   Musculoskeletal:         General: No swelling or deformity.      Cervical back: Neck supple.   Lymphadenopathy:      Head:      Right side of head: No submandibular or posterior auricular adenopathy.      Left side of head: No submandibular or posterior auricular adenopathy.      Cervical: No cervical adenopathy.      Right cervical: No superficial, deep or posterior cervical adenopathy.     Left cervical: No superficial, deep or posterior cervical adenopathy.      Upper Body:      Right upper body: No supraclavicular adenopathy.      Left upper body: No supraclavicular adenopathy.   Skin:     General: Skin is warm and dry.   Neurological:      General: No focal deficit present.      Mental Status: She is alert. Mental status is at baseline.      Gait: Gait normal.   Psychiatric:         Mood and Affect: Mood normal.         Behavior: Behavior normal.         Assessment:       1. Health maintenance examination    2. Essential hypertension    3. Mixed hyperlipidemia    4. Age-related osteoporosis without current pathological fracture    5. Vitamin D deficiency    6. Aortic valve stenosis, etiology of cardiac valve disease unspecified    7. Other abnormal glucose    8. Nicotine dependence, cigarettes, uncomplicated        Plan:       Essential hypertension  Continue current medications.  RTC in 6 months for follow up.  -     Comprehensive Metabolic Panel; Future  -     TSH; Future  -     CBC Auto Differential; Future  -     Microalbumin/Creatinine Ratio, Urine; Future  -     amLODIPine (NORVASC) 10 MG tablet; Take 1 tablet (10 mg total) by mouth once daily.  -     hydroCHLOROthiazide (HYDRODIURIL) 12.5 MG Tab; Take 1 tablet (12.5 mg total) by mouth once daily.    Mixed hyperlipidemia  Continue  current medications.  RTC in 6 months for follow up.  -     Lipid Panel; Future    Age-related osteoporosis without current pathological fracture  Vitamin D deficiency  Continue current medications.  Continue supplementation.  RTC in 6 months for follow up.  -     Vitamin D; Future    Aortic valve stenosis, etiology of cardiac valve disease unspecified  Continue evaluation and management per cardiologist.    Health maintenance examination  Reviewed and discussed age appropriate screenings and immunizations.  -     Comprehensive Metabolic Panel; Future  -     TSH; Future  -     Lipid Panel; Future  -     Hemoglobin A1C; Future  -     CBC Auto Differential; Future  -     Vitamin D; Future  -     Microalbumin/Creatinine Ratio, Urine; Future    Other abnormal glucose  -     Hemoglobin A1C; Future    Nicotine dependence, cigarettes, uncomplicated  -     CT Chest Lung Screening Low Dose; Future        Leslie aCrbajal MD  12/11/2023

## 2023-12-12 RX ORDER — AMLODIPINE BESYLATE 10 MG/1
10 TABLET ORAL DAILY
Qty: 90 TABLET | Refills: 3 | OUTPATIENT
Start: 2023-12-12

## 2023-12-12 RX ORDER — HYDROCHLOROTHIAZIDE 12.5 MG/1
12.5 TABLET ORAL DAILY
Qty: 90 TABLET | Refills: 3 | OUTPATIENT
Start: 2023-12-12

## 2023-12-12 NOTE — TELEPHONE ENCOUNTER
Refill Decision Note   Kristy Wallisch  is requesting a refill authorization.    Brief Assessment and Rationale for Refill:   Quick Discontinue       Medication Therapy Plan:   FLOS; E-Prescribing Status: Receipt confirmed by pharmacy (12/11/2023  4:17 PM CST)      Comments:     Note composed:10:43 AM 12/12/2023

## 2023-12-12 NOTE — TELEPHONE ENCOUNTER
Care Due:                  Date            Visit Type   Department     Provider  --------------------------------------------------------------------------------                                MYCHART                              ANNUAL                              CHECKUP/PHY  [facility] INTERNAL  Last Visit: 12-      [facility]       Leslie Carbajal  Next Visit: None Scheduled  None         None Found                                                            Last  Test          Frequency    Reason                     Performed    Due Date  --------------------------------------------------------------------------------    CMP.........  12 months..  hydroCHLOROthiazide......  11- 11-    [facility] Embedded Care Due Messages. Reference number: [id number].   12/12/2023 10:37:32 AM CST

## 2023-12-27 ENCOUNTER — HOSPITAL ENCOUNTER (OUTPATIENT)
Dept: RADIOLOGY | Facility: OTHER | Age: 68
Discharge: HOME | End: 2023-12-27
Attending: INTERNAL MEDICINE
Payer: MEDICARE

## 2023-12-27 ENCOUNTER — HOSPITAL ENCOUNTER (OUTPATIENT)
Dept: RADIOLOGY | Facility: OTHER | Age: 68
Discharge: HOME | End: 2023-12-27
Attending: STUDENT IN AN ORGANIZED HEALTH CARE EDUCATION/TRAINING PROGRAM
Payer: MEDICARE

## 2023-12-27 ENCOUNTER — PATIENT MESSAGE (OUTPATIENT)
Dept: INTERNAL MEDICINE | Facility: CLINIC | Age: 68
End: 2023-12-27
Payer: MEDICARE

## 2023-12-27 DIAGNOSIS — Z12.31 ENCOUNTER FOR SCREENING MAMMOGRAM FOR MALIGNANT NEOPLASM OF BREAST: ICD-10-CM

## 2023-12-27 DIAGNOSIS — Z80.3 FAMILY HISTORY OF BREAST CANCER: ICD-10-CM

## 2023-12-27 DIAGNOSIS — F17.210 NICOTINE DEPENDENCE, CIGARETTES, UNCOMPLICATED: ICD-10-CM

## 2023-12-27 PROCEDURE — 77063 MAMMO DIGITAL SCREENING BILAT WITH TOMO: ICD-10-PCS | Mod: 26,GA,, | Performed by: RADIOLOGY

## 2023-12-27 PROCEDURE — 77063 BREAST TOMOSYNTHESIS BI: CPT | Mod: 26,GA,, | Performed by: RADIOLOGY

## 2023-12-27 PROCEDURE — 71271 CT CHEST LUNG SCREENING LOW DOSE: ICD-10-PCS | Mod: 26,,, | Performed by: RADIOLOGY

## 2023-12-27 PROCEDURE — 77067 SCR MAMMO BI INCL CAD: CPT | Mod: GA,TC

## 2023-12-27 PROCEDURE — 77067 SCR MAMMO BI INCL CAD: CPT | Mod: 26,GA,, | Performed by: RADIOLOGY

## 2023-12-27 PROCEDURE — 77067 MAMMO DIGITAL SCREENING BILAT WITH TOMO: ICD-10-PCS | Mod: 26,GA,, | Performed by: RADIOLOGY

## 2023-12-27 PROCEDURE — 71271 CT THORAX LUNG CANCER SCR C-: CPT | Mod: TC

## 2023-12-27 PROCEDURE — 71271 CT THORAX LUNG CANCER SCR C-: CPT | Mod: 26,,, | Performed by: RADIOLOGY

## 2023-12-28 PROBLEM — M81.0 AGE-RELATED OSTEOPOROSIS WITHOUT CURRENT PATHOLOGICAL FRACTURE: Status: ACTIVE | Noted: 2023-12-28

## 2024-01-29 ENCOUNTER — OFFICE VISIT (OUTPATIENT)
Dept: OPTOMETRY | Facility: CLINIC | Age: 69
End: 2024-01-29
Payer: MEDICARE

## 2024-01-29 DIAGNOSIS — H52.203 HYPEROPIA WITH ASTIGMATISM AND PRESBYOPIA, BILATERAL: Primary | ICD-10-CM

## 2024-01-29 DIAGNOSIS — H52.4 HYPEROPIA WITH ASTIGMATISM AND PRESBYOPIA, BILATERAL: Primary | ICD-10-CM

## 2024-01-29 DIAGNOSIS — H52.03 HYPEROPIA WITH ASTIGMATISM AND PRESBYOPIA, BILATERAL: Primary | ICD-10-CM

## 2024-01-29 PROCEDURE — 99999 PR PBB SHADOW E&M-EST. PATIENT-LVL III: CPT | Mod: PBBFAC,,, | Performed by: OPTOMETRIST

## 2024-01-29 PROCEDURE — 99499 UNLISTED E&M SERVICE: CPT | Mod: S$PBB,,, | Performed by: OPTOMETRIST

## 2024-01-29 PROCEDURE — 99213 OFFICE O/P EST LOW 20 MIN: CPT | Mod: PBBFAC,PO | Performed by: OPTOMETRIST

## 2024-01-29 NOTE — PROGRESS NOTES
JC LEPE: 10/23  Chief complaint (CC): Patient is here for an RX check today. Patient had   new glasses made and has had trouble seeing driving distance and captions   on TV. Near vision seems fine.  Patient went back to her older glasses   because she sees better with them.  Glasses? + 3 months Old RX ( glasses where made earlier this month)  Contacts? -  H/o eye surgery, injections or laser: -  H/o eye injury: -  Known eye conditions? See above  Family h/o eye conditions? Mother with AMD  Eye gtts? -      (-) Flashes (-)  Floaters (-) Mucous   (-)  Tearing (-) Itching (-) Burning   (-) Headaches (-) Eye Pain/discomfort (-) Irritation   (-)  Redness (-) Double vision (-) Blurry vision    Diabetic? -  A1c? -      Last edited by Marisela Suarez on 1/29/2024  2:57 PM.            Assessment /Plan     For exam results, see Encounter Report.    Hyperopia with astigmatism and presbyopia, bilateral      1/29/2024 Pt presented today w/Rx from 10/11/2023 with complaints of blurry vision at distance and near. Pt reports clearer vision w/today's MRx when compared in phoropter to 2 year old Rx and Rx from 10/11/2023. Trial framed MRx today and pt confirms clear vision.    Dr. Dionne caban

## 2024-02-27 DIAGNOSIS — Z00.00 ENCOUNTER FOR MEDICARE ANNUAL WELLNESS EXAM: ICD-10-CM

## 2024-03-04 ENCOUNTER — PATIENT MESSAGE (OUTPATIENT)
Dept: ADMINISTRATIVE | Facility: HOSPITAL | Age: 69
End: 2024-03-04
Payer: MEDICARE

## 2024-03-05 ENCOUNTER — TELEPHONE (OUTPATIENT)
Dept: ADMINISTRATIVE | Facility: HOSPITAL | Age: 69
End: 2024-03-05
Payer: MEDICARE

## 2024-03-05 VITALS — SYSTOLIC BLOOD PRESSURE: 127 MMHG | DIASTOLIC BLOOD PRESSURE: 79 MMHG

## 2024-04-03 ENCOUNTER — OFFICE VISIT (OUTPATIENT)
Dept: INTERNAL MEDICINE | Facility: CLINIC | Age: 69
End: 2024-04-03
Payer: MEDICARE

## 2024-04-03 VITALS
HEART RATE: 69 BPM | OXYGEN SATURATION: 96 % | WEIGHT: 161.63 LBS | BODY MASS INDEX: 27.59 KG/M2 | HEIGHT: 64 IN | SYSTOLIC BLOOD PRESSURE: 124 MMHG | DIASTOLIC BLOOD PRESSURE: 82 MMHG

## 2024-04-03 DIAGNOSIS — R94.120 ABNORMAL HEARING SCREEN: ICD-10-CM

## 2024-04-03 DIAGNOSIS — E78.2 MIXED HYPERLIPIDEMIA: ICD-10-CM

## 2024-04-03 DIAGNOSIS — E55.9 VITAMIN D DEFICIENCY: ICD-10-CM

## 2024-04-03 DIAGNOSIS — G25.0 BENIGN ESSENTIAL TREMOR: ICD-10-CM

## 2024-04-03 DIAGNOSIS — H91.90 HEARING LOSS, UNSPECIFIED HEARING LOSS TYPE, UNSPECIFIED LATERALITY: ICD-10-CM

## 2024-04-03 DIAGNOSIS — Z00.00 ENCOUNTER FOR PREVENTIVE HEALTH EXAMINATION: Primary | ICD-10-CM

## 2024-04-03 DIAGNOSIS — F17.200 TOBACCO DEPENDENCY: ICD-10-CM

## 2024-04-03 DIAGNOSIS — I70.0 AORTIC ATHEROSCLEROSIS: ICD-10-CM

## 2024-04-03 DIAGNOSIS — M81.0 AGE-RELATED OSTEOPOROSIS WITHOUT CURRENT PATHOLOGICAL FRACTURE: ICD-10-CM

## 2024-04-03 DIAGNOSIS — I10 ESSENTIAL HYPERTENSION: ICD-10-CM

## 2024-04-03 DIAGNOSIS — L40.9 PSORIASIS: ICD-10-CM

## 2024-04-03 DIAGNOSIS — Z00.00 ENCOUNTER FOR MEDICARE ANNUAL WELLNESS EXAM: ICD-10-CM

## 2024-04-03 PROCEDURE — G0439 PPPS, SUBSEQ VISIT: HCPCS | Mod: ,,, | Performed by: NURSE PRACTITIONER

## 2024-04-03 PROCEDURE — 99215 OFFICE O/P EST HI 40 MIN: CPT | Mod: PBBFAC | Performed by: NURSE PRACTITIONER

## 2024-04-03 PROCEDURE — 99999 PR PBB SHADOW E&M-EST. PATIENT-LVL V: CPT | Mod: PBBFAC,,, | Performed by: NURSE PRACTITIONER

## 2024-04-03 NOTE — PATIENT INSTRUCTIONS
Counseling and Referral of Other Preventative  (Italic type indicates deductible and co-insurance are waived)    Patient Name: Kristy Wallisch  Today's Date: 4/3/2024    Health Maintenance       Date Due Completion Date    RSV Vaccine (Age 60+ and Pregnant patients) (1 - 1-dose 60+ series) Never done ---    Pneumococcal Vaccines (Age 65+) (3 of 3 - PPSV23 or PCV20) 03/28/2024 8/3/2020    LDCT Lung Screen 12/27/2024 12/27/2023    Hemoglobin A1c (Prediabetes) 12/27/2024 12/27/2023    Mammogram 12/27/2024 12/27/2023    TETANUS VACCINE 01/01/2026 1/1/2016 (Done)    Override on 1/1/2016: Done    DEXA Scan 08/04/2026 8/4/2023    Lipid Panel 12/27/2028 12/27/2023    Colorectal Cancer Screening 09/11/2030 9/11/2023        Orders Placed This Encounter   Procedures    Ambulatory referral/consult to ENT    Ambulatory referral/consult to Audiology     The following information is provided to all patients.  This information is to help you find resources for any of the problems found today that may be affecting your health:                  Living healthy guide: www.[facility].[place].gov      Understanding Diabetes: www.diabetes.org      Eating healthy: www.cdc.gov/healthyweight      CDC home safety checklist: www.cdc.gov/steadi/patient.html      Agency on Aging: www.goea.[place].[facility]      Alcoholics anonymous (AA): www.aa.org      Physical Activity: www.anselmo.nih.gov/zs0lavg      Tobacco use: www.quitwithusla.org

## 2024-04-03 NOTE — PROGRESS NOTES
" Kristy Wallisch presented for an initial Medicare AWV today. The following components were reviewed and updated:    Medical history  Family History  Social history  Allergies and Current Medications  Health Risk Assessment  Health Maintenance  Care Team    **See Completed Assessments for Annual Wellness visit with in the encounter summary    The following assessments were completed:  Depression Screening  Cognitive function Screening    Timed Get Up Test  Whisper Test      Opioid documentation:      Patient does not have a current opioid prescription.          Vitals:    04/03/24 0925   BP: 124/82   BP Location: Right arm   Patient Position: Sitting   BP Method: Small (Manual)   Pulse: 69   SpO2: 96%   Weight: 73.3 kg (161 lb 9.6 oz)   Height: 5' 4" (1.626 m)     Body mass index is 27.74 kg/m².       Physical Exam  Vitals and nursing note reviewed.   Constitutional:       Appearance: She is well-developed.   HENT:      Head: Normocephalic.   Cardiovascular:      Rate and Rhythm: Normal rate and regular rhythm.      Heart sounds: Normal heart sounds. No murmur heard.  Pulmonary:      Effort: Pulmonary effort is normal.      Breath sounds: Normal breath sounds.   Abdominal:      General: Bowel sounds are normal.      Palpations: Abdomen is soft.   Musculoskeletal:         General: Normal range of motion.   Neurological:      Mental Status: She is alert and oriented to person, place, and time.      Motor: No abnormal muscle tone.            Diagnoses and health risks identified today and associated recommendations/orders:    1. Encounter for preventive health examination  Here for Health Risk Assessment/Annual Wellness Visit.  Health maintenance reviewed and updated. Follow up in one year.     2. Essential hypertension  Chronic, at goal on current medications. Followed by PCP.     3. Aortic atherosclerosis  Chronic, stable on current medications. Noted CT Chest/Lung 12/27/23. Followed by PCP, Cardiology.     4. Mixed " hyperlipidemia  Chronic, at goal on current medications. Followed by PCP, Cardiology.     5. Benign essential tremor  Chronic, stable. Followed by PCP.     6. Psoriasis  Chronic, stable. Followed by PCP.     7. Age-related osteoporosis without current pathological fracture  Chronic, stable on current medications. Followed by PCP.     8. Vitamin D deficiency  Chronic, stable on current medication. Followed by PCP.     9. Encounter for Medicare annual wellness exam  - Ambulatory Referral/Consult to Enhanced Annual Wellness Visit (eAWV)    10. Abnormal hearing screen  - Ambulatory referral/consult to ENT; Future    11. Hearing loss, unspecified hearing loss type, unspecified laterality  - Ambulatory referral/consult to Audiology; Future    12. Tobacco dependency  Chronic, continues to smoke 1/2 PPD. Declined referral to Smoking Cessation. Counseled to stop smoking.      Provided Shanae with a 5-10 year written screening schedule and personal prevention plan. Recommendations were developed using the USPSTF age appropriate recommendations. Education, counseling, and referrals were provided as needed.  After Visit Summary printed and given to patient which includes a list of additional screenings\tests needed.    Follow up in about 36 weeks (around 12/11/2024).with PCP      Brooklyn Rush NP

## 2024-04-04 ENCOUNTER — TELEPHONE (OUTPATIENT)
Dept: AUDIOLOGY | Facility: CLINIC | Age: 69
End: 2024-04-04
Payer: MEDICARE

## 2024-04-04 NOTE — TELEPHONE ENCOUNTER
----- Message from Angelita Houser sent at 4/4/2024  9:46 AM CDT -----  Regarding: referral  Contact: @ [phone]  Pt is calling to make a appointment from a referral for H91.90 (ICD-10-CM) - Hearing loss, unspecified hearing loss type, unspecified laterality  .....Please call and adv @ [phone] would like the same location

## 2024-04-04 NOTE — TELEPHONE ENCOUNTER
Spoke with patient regarding scheduling and audiogram.  She stated that she has a history of cerumen buildup and will likely need to have her ears cleaned.  She will proceed with ENT appointment as scheduled and obtain hearing testing once ears are confirmed clear.

## 2024-06-07 ENCOUNTER — OFFICE VISIT (OUTPATIENT)
Dept: OTOLARYNGOLOGY | Facility: CLINIC | Age: 69
End: 2024-06-07
Payer: MEDICARE

## 2024-06-07 VITALS
BODY MASS INDEX: 28.25 KG/M2 | HEART RATE: 74 BPM | DIASTOLIC BLOOD PRESSURE: 72 MMHG | SYSTOLIC BLOOD PRESSURE: 139 MMHG | WEIGHT: 164.56 LBS

## 2024-06-07 DIAGNOSIS — H91.90 HEARING LOSS, UNSPECIFIED HEARING LOSS TYPE, UNSPECIFIED LATERALITY: Primary | ICD-10-CM

## 2024-06-07 DIAGNOSIS — H61.21 HEARING LOSS DUE TO CERUMEN IMPACTION, RIGHT: ICD-10-CM

## 2024-06-07 DIAGNOSIS — R94.120 ABNORMAL HEARING SCREEN: ICD-10-CM

## 2024-06-07 PROCEDURE — 99999 PR PBB SHADOW E&M-EST. PATIENT-LVL III: CPT | Mod: PBBFAC,,, | Performed by: OTOLARYNGOLOGY

## 2024-06-07 PROCEDURE — 99213 OFFICE O/P EST LOW 20 MIN: CPT | Mod: 25,S$PBB,, | Performed by: OTOLARYNGOLOGY

## 2024-06-07 PROCEDURE — 99213 OFFICE O/P EST LOW 20 MIN: CPT | Mod: PBBFAC,PN,25 | Performed by: OTOLARYNGOLOGY

## 2024-06-07 PROCEDURE — 69210 REMOVE IMPACTED EAR WAX UNI: CPT | Mod: PBBFAC,PN | Performed by: OTOLARYNGOLOGY

## 2024-06-07 PROCEDURE — 69210 REMOVE IMPACTED EAR WAX UNI: CPT | Mod: S$PBB,,, | Performed by: OTOLARYNGOLOGY

## 2024-06-07 NOTE — PROCEDURES
EAR DEBRIDEMENT / CERUMEN DISIMPACTION, 82651     Indication: Excessive cerumen with hearing loss    Side: Right    Findings: cerumen and 100% ear obstruction, not onto TM    Procedure: Ear canal(s) cleared completely using suction with microscopy.  Wax was not hydrolyzed with peroxide.  Topical medication was not applied.    Complication: none

## 2024-06-07 NOTE — PROGRESS NOTES
[facility]    Subjective:      Patient: Kristy M Wallisch Patient PCP: Leslie Carbajal MD         :  1955     Sex:  female      MRN:  [id number]          Date of Visit: 2024      Chief Complaint: Hearing Loss (Right ear more affected)      Patient ID: Kristy M Wallisch is a 68 y.o. female     Patient is a  current smoker with a past medical history of bone density issues on Fosamax, HTN on Norvasc and hydrochlorothiazide as well as HDL on Lipitor as well as daily aspirin 81 mg seen today referred to me by Brooklyn Rush in consultation for right-greater-than-left subjective hearing loss.  Seen by ENT nurse practitioner in  with no evaluation performed only cerumen disimpaction.  No prior audiogram.  Denies any sudden change in hearing, vertigo, pain or drainage.        Labs:  WBC   Date Value Ref Range Status   2023 10.01 3.90 - 12.70 K/uL Final     Hemoglobin   Date Value Ref Range Status   2023 15.8 12.0 - 16.0 g/dL Final     Platelets   Date Value Ref Range Status   2023 216 150 - 450 K/uL Final     Creatinine   Date Value Ref Range Status   2023 0.8 0.5 - 1.4 mg/dL Final     TSH   Date Value Ref Range Status   2023 2.430 0.400 - 4.000 uIU/mL Final     Hemoglobin A1C   Date Value Ref Range Status   2023 5.4 4.0 - 5.6 % Final     Comment:     ADA Screening Guidelines:  5.7-6.4%  Consistent with prediabetes  >or=6.5%  Consistent with diabetes    High levels of fetal hemoglobin interfere with the HbA1C  assay. Heterozygous hemoglobin variants (HbS, HgC, etc)do  not significantly interfere with this assay.   However, presence of multiple variants may affect accuracy.         Past Medical History  She has a past medical history of Benign essential tremor, Cataract, HTN (hypertension), Leg laceration, right, subsequent encounter, Posterior vitreous detachment of right eye, and Psoriasis.    Family / Surgical / Social History  Her family history includes  Alcohol abuse in her brother and father; Arthritis in her brother and mother; Breast cancer in her paternal aunt and paternal grandmother; Diabetes in her brother, father, and mother; Essential Tremor in her brother and mother; Heart disease in her brother and father; Hyperlipidemia in her father; Hypertension in her father and mother; Stroke in her father and mother.    Past Surgical History:   Procedure Laterality Date    COLONOSCOPY N/A 9/11/2023    Procedure: COLONOSCOPY;  Surgeon: Stacey Chavez MD;  Location: [facility] ([address] [place] [address]);  Service: Endoscopy;  Laterality: N/A;  7/6- referred by Leslie Carvajal MD/ Prep instrutions to [place]. [facility]  [facility]    TONS[facility]  1960       Social History     Tobacco Use    Smoking status: Every Day     Current packs/day: 0.50     Average packs/day: 0.5 packs/day for 50.4 years (25.2 ttl pk-yrs)     Types: Cigarettes     Start date: 1/1/1974    Smokeless tobacco: Never   Substance and Sexual Activity    Alcohol use: Yes     Alcohol/week: 9.0 standard drinks of alcohol     Types: 5 Glasses of wine, 2 Cans of beer, 2 Drinks containing 0.5 oz of alcohol per week     Comment: 1 glass of wine or beer daily    Drug use: No    Sexual activity: Yes     Partners: Male     Birth control/protection: Post-menopausal       Medications  She has a current medication list which includes the following prescription(s): alendronate, amlodipine, ascorbic acid (vitamin c), aspirin, atorvastatin, calcium citrate-vitamin d3 315-200 mg, glucosamine hcl-msm-chondroitn, hydrochlorothiazide, and multivitamin.      Allergies  Review of patient's allergies indicates:  No Known Allergies    All medications, allergies, and past history have been reviewed.    Objective:      Vitals:      3/5/2024     7:21 AM 4/3/2024     9:25 AM 6/7/2024    10:53 AM   Vitals - 1 value per visit   SYSTOLIC 127 124 139   DIASTOLIC 79 82 72   Pulse  69 74   SPO2  96 %    Weight (lb)  161.6 164.57   Weight (kg)  " 73.3 74.65   Height  5' 4" (1.626 m)    BMI (Calculated)  27.7    Pain Score  Zero Zero       Body surface area is 1.84 meters squared.    Physical Exam:    GENERAL  APPEARANCE -  alert, appears stated age, and cooperative  BARRIER(S) TO COMMUNICATION -  none VOICE - appropriate for age and gender    INTEGUMENTARY  no suspicious head and neck lesions    HEENT  HEAD: Normocephalic, without obvious abnormality, atraumatic  FACE: INSPECTION - Symmetric, no signs of trauma, no suspicious lesion(s)      STRENGTH - facial symmetry intact     EYES  Normal occular alignment and mobility with no visible nystagmus at rest    EARS/NOSE/MOUTH/THROAT  EARS  PINNAE AND EXTERNAL EARS - no suspicious lesion OTOSCOPIC EXAM (surgical microscopy was used for visualization/instrumentation): EAR EXAM - Wax preventing exam / occluding the ear canal or causing symptoms noted was cleared with suction and curette w/micro with a tiny area of right anterior mid canal abrasion w/o bleeding to reveal a normal EAC, TM and ME exam bilaterally. HEARING - grossly intact to voice/finger rub    NOSE AND SINUSES  EXTERNAL NOSE - Grossly normal for age/sex   MUCOSA - no drainage    CHEST AND LUNG   INSPECTION & AUSCULTATION - normal effort, no stridor    NEUROLOGIC  MENTAL STATUS - alert, interactive CRANIAL NERVES - normal        Procedure(s):  Cerumen removal performed.  See procedure note.          Assessment:      Problem List Items Addressed This Visit    None  Visit Diagnoses       Hearing loss, unspecified hearing loss type, unspecified laterality    -  Primary    Abnormal hearing screen        Hearing loss due to cerumen impaction, right                     Plan:      Routine ear care as outlined.      Audiogram.    Follow up as needed          Voice recognition software was used in the creation of this note/communication and any sound-alike errors which may have occurred from its use should be taken in context when interpreting.  If such " errors prevent a clear understanding of the note/communication, please contact the office for clarification.

## 2024-06-24 ENCOUNTER — CLINICAL SUPPORT (OUTPATIENT)
Dept: AUDIOLOGY | Facility: CLINIC | Age: 69
End: 2024-06-24
Payer: MEDICARE

## 2024-06-24 DIAGNOSIS — R94.120 ABNORMAL HEARING SCREEN: ICD-10-CM

## 2024-06-24 DIAGNOSIS — H93.11 TINNITUS, RIGHT: ICD-10-CM

## 2024-06-24 DIAGNOSIS — H90.3 SENSORINEURAL HEARING LOSS (SNHL), BILATERAL: Primary | ICD-10-CM

## 2024-06-24 DIAGNOSIS — H61.21 HEARING LOSS DUE TO CERUMEN IMPACTION, RIGHT: ICD-10-CM

## 2024-06-24 PROCEDURE — 92567 TYMPANOMETRY: CPT | Mod: PBBFAC

## 2024-06-24 PROCEDURE — 92557 COMPREHENSIVE HEARING TEST: CPT | Mod: PBBFAC

## 2024-06-24 NOTE — Clinical Note
Good afternoon,   Here are the results from Ms. Wallisch's hearing evaluation. Please let me know if you need any further information or if you have any questions. Thanks!  Crystal Mccormack, CCC-A

## 2024-06-24 NOTE — PROGRESS NOTES
Kristy M Wallisch, a 68 y.o. female, was seen today in the clinic for an audiologic evaluation.  The patient reported she was referred for a hearing evaluation after a wellness visit.  Ms. Wallisch reported intermittent tinnitus in the right ear.  She denied aural fullness, otalgia, and dizziness.  She reported she experiences hearing difficulty at places such as restaurants.     Tympanometry revealed Type A in the right ear and Type A in the left ear.  Audiogram results revealed normal hearing sensitivity from 250-2000 Hz sloping to a moderate sensorineural hearing loss by 8000 Hz, bilaterally.  Speech reception thresholds were noted at 25 dB in the right ear and 15 dB in the left ear.  Speech discrimination scores were 100% in the right ear and 100% in the left ear.    Recommendations:  Otologic evaluation as needed  Hearing protection when in noise  Hearing aid consultation if patient feels that her quality of life/ability to communicate is negatively impacted by her hearing loss  Annual audiogram or sooner if change in hearing is perceived

## 2024-08-01 ENCOUNTER — OFFICE VISIT (OUTPATIENT)
Dept: INTERNAL MEDICINE | Facility: CLINIC | Age: 69
End: 2024-08-01
Payer: MEDICARE

## 2024-08-01 VITALS
OXYGEN SATURATION: 98 % | DIASTOLIC BLOOD PRESSURE: 70 MMHG | WEIGHT: 165.56 LBS | BODY MASS INDEX: 28.42 KG/M2 | SYSTOLIC BLOOD PRESSURE: 128 MMHG | HEART RATE: 90 BPM

## 2024-08-01 DIAGNOSIS — I35.0 MILD AORTIC STENOSIS: ICD-10-CM

## 2024-08-01 DIAGNOSIS — M81.0 AGE-RELATED OSTEOPOROSIS WITHOUT CURRENT PATHOLOGICAL FRACTURE: ICD-10-CM

## 2024-08-01 DIAGNOSIS — Z91.09 ENVIRONMENTAL ALLERGIES: ICD-10-CM

## 2024-08-01 DIAGNOSIS — F17.210 NICOTINE DEPENDENCE, CIGARETTES, UNCOMPLICATED: ICD-10-CM

## 2024-08-01 DIAGNOSIS — Z00.00 HEALTH MAINTENANCE EXAMINATION: Primary | ICD-10-CM

## 2024-08-01 DIAGNOSIS — I10 ESSENTIAL HYPERTENSION: ICD-10-CM

## 2024-08-01 DIAGNOSIS — E55.9 VITAMIN D DEFICIENCY: ICD-10-CM

## 2024-08-01 DIAGNOSIS — Z12.31 SCREENING MAMMOGRAM FOR BREAST CANCER: ICD-10-CM

## 2024-08-01 DIAGNOSIS — R73.09 OTHER ABNORMAL GLUCOSE: ICD-10-CM

## 2024-08-01 DIAGNOSIS — E78.2 MIXED HYPERLIPIDEMIA: ICD-10-CM

## 2024-08-01 PROCEDURE — 99999 PR PBB SHADOW E&M-EST. PATIENT-LVL IV: CPT | Mod: PBBFAC,,, | Performed by: STUDENT IN AN ORGANIZED HEALTH CARE EDUCATION/TRAINING PROGRAM

## 2024-08-01 PROCEDURE — 99214 OFFICE O/P EST MOD 30 MIN: CPT | Mod: S$PBB,,, | Performed by: STUDENT IN AN ORGANIZED HEALTH CARE EDUCATION/TRAINING PROGRAM

## 2024-08-01 PROCEDURE — 99214 OFFICE O/P EST MOD 30 MIN: CPT | Mod: PBBFAC | Performed by: STUDENT IN AN ORGANIZED HEALTH CARE EDUCATION/TRAINING PROGRAM

## 2024-08-01 PROCEDURE — G2211 COMPLEX E/M VISIT ADD ON: HCPCS | Mod: S$PBB,,, | Performed by: STUDENT IN AN ORGANIZED HEALTH CARE EDUCATION/TRAINING PROGRAM

## 2024-08-01 RX ORDER — AZELASTINE 1 MG/ML
2 SPRAY, METERED NASAL 2 TIMES DAILY
Qty: 30 ML | Refills: 2 | Status: SHIPPED | OUTPATIENT
Start: 2024-08-01 | End: 2025-08-01

## 2024-08-01 NOTE — PROGRESS NOTES
Subjective:       Patient ID: Kristy M Wallisch is a 68 y.o. female.    Chief Complaint: Health maintenance examination [Z00.00]    Patient is established with me, here today for the following:     HTN, HLD, osteoporosis, vitamin D deficiency, essential tremor, mild aortic stenosis     Health maintenance -   Colonoscopy performed SEP2023. Told to repeat in 7 years.  Denies family history of colorectal cancer.  Mammogram BI-RADS 1 in DEC2023. Due for repeat in DEC2024.  Denies history of abnormal mammogram.  Family history of breast cancer.  Denies family history of ovarian cancer.  Completed pap screening.  Denies history of abnormal pap smear.  Family history of cardiac disease.  UTD on Tdap, COVID primary/booster, PPSV23, PCV13, shingles vaccinations.  Due for RSV, COVID vaccinations.  Started smoking at age 24, at most 1 PPD. Currently smoking <0.5 PPD.  Drinks alcohol 5-7 times weekly, 1 drinks per sitting.  Denies current drug use.  Low dose lung CT scan was LUNG-RADS 2 in DEC2023. Due for repeat in DEC2024.  Completed HIV and hepatitis C screening.  Due for diabetes screening.  Lab Results       Component                Value               Date                       HGBA1C                   5.4                 12/27/2023            Endorses overall healthy diet.   Eats mostly at home.  Endorses active lifestyle.     HTN -   Currently prescribed amlodipine, HCTZ.  Patient endorses taking medication as directed.  Denies side effects or concerns while taking medication.  Endorses average home blood pressure 126/68  Due for micro albumin creatinine ratio.   Lab Results       Component                Value               Date                       MICALBCREAT              7.9                 12/27/2023            BP Readings from Last 5 Encounters:  06/07/24 : 139/72  04/03/24 : 124/82  03/05/24 : 127/79  12/11/23 : (!) 147/67  09/26/23 : 135/65     HLD -   Endorses taking atorvastatin as directed  Denies side  "effects or concerns while taking medication  Lab Results       Component                Value               Date                       CHOL                     144                 12/27/2023            Lab Results       Component                Value               Date                       TRIG                     58                  12/27/2023            Lab Results       Component                Value               Date                       LDLCALC                  76.4                12/27/2023            Lab Results       Component                Value               Date                       HDL                      56                  12/27/2023            Due for lipid recheck.     Osteoporosis -   Vitamin D deficiency -   Completed DEXA in AUG2023.  Showed osteopenia.  "Ten year fracture probability:  Major osteoporotic 13.1%, hip 4%"  Denies family history of osteoporosis.  Currently taking alendronate. Started medication in SEP2023.  Currently taking vitamin D3 daily.  Currently taking calcium daily.  Lab Results       Component                Value               Date                       [id number]             46                  12/27/2023                 [id number]             38                  11/18/2022                 [id number]             48                  11/01/2021              Mild aortic stenosis -   Following with Dr. Noe for cardiology.  Plans for repeat echo and follow up appt AUG2024              Review of Systems   Constitutional:  Negative for activity change, fatigue and fever.   Respiratory:  Negative for cough and shortness of breath.    Cardiovascular:  Negative for chest pain and palpitations.   Gastrointestinal:  Negative for abdominal pain, constipation, diarrhea, nausea and vomiting.   Neurological:  Negative for syncope and headaches.         Current Outpatient Medications   Medication Instructions    alendronate (FOSAMAX) 70 mg, Oral, Every 7 days    amLODIPine " (NORVASC) 10 mg, Oral, Daily    ascorbic acid, vitamin C, (VITAMIN C) 500 MG tablet Take by mouth. 1 Tablet Oral Every day    aspirin 81 MG Chew Take by mouth. 1 Tablet, Chewable Oral Every day    atorvastatin (LIPITOR) 80 mg, Oral, Daily    calcium citrate-vitamin D3 315-200 mg (CITRACAL+D) 315-200 mg-unit per tablet 1 tablet, Oral, 2 times daily    GLUCOSAM HCL/MSM/CHONDRO MONTE A (GLUCOSAMINE HCL-MSM-CHONDROITN) 375-250-300 mg Tab Take by mouth. 1 Tablet Oral Every day    hydroCHLOROthiazide (HYDRODIURIL) 12.5 mg, Oral, Daily    multivitamin (THERAGRAN) per tablet Take by mouth. 1 Tablet Oral Every day     Objective:      Vitals:    08/01/24 1358   BP: 128/70   Pulse: 90   SpO2: 98%   Weight: 75.1 kg (165 lb 9.1 oz)   PainSc: 0-No pain     Body mass index is 28.42 kg/m².    Physical Exam  Vitals reviewed.   Constitutional:       General: She is not in acute distress.     Appearance: Normal appearance. She is not ill-appearing or diaphoretic.   HENT:      Head: Normocephalic and atraumatic.      Right Ear: Tympanic membrane, ear canal and external ear normal. There is no impacted cerumen.      Left Ear: Tympanic membrane, ear canal and external ear normal. There is no impacted cerumen.      Nose: Nose normal. No rhinorrhea.      Mouth/Throat:      Mouth: Mucous membranes are moist.      Pharynx: Oropharynx is clear. No oropharyngeal exudate or posterior oropharyngeal erythema.   Eyes:      General: No scleral icterus.        Right eye: No discharge.         Left eye: No discharge.      Conjunctiva/sclera: Conjunctivae normal.   Neck:      Thyroid: No thyromegaly or thyroid tenderness.      Trachea: Trachea normal.   Cardiovascular:      Rate and Rhythm: Normal rate and regular rhythm.      Heart sounds: Normal heart sounds. No murmur heard.     No friction rub. No gallop.   Pulmonary:      Effort: Pulmonary effort is normal. No respiratory distress.      Breath sounds: Normal breath sounds. No stridor. No wheezing,  rhonchi or rales.   Abdominal:      General: There is no distension.      Palpations: Abdomen is soft.      Tenderness: There is no abdominal tenderness. There is no guarding or rebound.   Musculoskeletal:         General: No swelling or deformity.      Cervical back: Neck supple.   Lymphadenopathy:      Head:      Right side of head: No submandibular or posterior auricular adenopathy.      Left side of head: No submandibular or posterior auricular adenopathy.      Cervical: No cervical adenopathy.      Right cervical: No superficial, deep or posterior cervical adenopathy.     Left cervical: No superficial, deep or posterior cervical adenopathy.      Upper Body:      Right upper body: No supraclavicular adenopathy.      Left upper body: No supraclavicular adenopathy.   Skin:     General: Skin is warm and dry.   Neurological:      General: No focal deficit present.      Mental Status: She is alert. Mental status is at baseline.      Gait: Gait normal.   Psychiatric:         Mood and Affect: Mood normal.         Behavior: Behavior normal.         Assessment:       1. Health maintenance examination    2. Essential hypertension    3. Mixed hyperlipidemia    4. Age-related osteoporosis without current pathological fracture    5. Vitamin D deficiency    6. Mild aortic stenosis    7. Environmental allergies    8. Other abnormal glucose    9. Screening mammogram for breast cancer    10. Nicotine dependence, cigarettes, uncomplicated        Plan:       Essential hypertension  Continue current medications.  RTC in 6 months for follow up.  -     Comprehensive Metabolic Panel; Future  -     TSH; Future  -     CBC Auto Differential; Future  -     Microalbumin/Creatinine Ratio, Urine; Future    Mixed hyperlipidemia  Continue current medications.  RTC in 6 months for follow up.  -     Lipid Panel; Future    Age-related osteoporosis without current pathological fracture  Vitamin D deficiency  Continue current medications.  Continue  supplementation.  RTC in 6 months for follow up.  -     Vitamin D; Future    Mild aortic stenosis  Continue evaluation and management per cardiologist.  -     Ambulatory referral/consult to Cardiology; Future  -     Echo Saline Bubble? No; Ultrasound enhancing contrast? No; Future  -     CV Ultrasound Bilateral Doppler Carotid; Future    Environmental allergies  -     azelastine (ASTELIN) 137 mcg (0.1 %) nasal spray; 2 sprays (274 mcg total) by Nasal route 2 (two) times daily.    Health maintenance examination  Reviewed and discussed age appropriate screenings and immunizations.  -     Comprehensive Metabolic Panel; Future  -     TSH; Future  -     Lipid Panel; Future  -     Hemoglobin A1C; Future  -     CBC Auto Differential; Future  -     Vitamin D; Future  -     Microalbumin/Creatinine Ratio, Urine; Future    Other abnormal glucose  -     Hemoglobin A1C; Future    Screening mammogram for breast cancer  -     Mammo Digital Screening Bilat w/ Vijay; Future    Nicotine dependence, cigarettes, uncomplicated  -     CT Chest Lung Screening Low Dose; Future      Leslie Carbajal MD  8/1/2024

## 2024-08-02 ENCOUNTER — TELEPHONE (OUTPATIENT)
Dept: ADMINISTRATIVE | Facility: OTHER | Age: 69
End: 2024-08-02
Payer: MEDICARE

## 2024-08-02 NOTE — TELEPHONE ENCOUNTER
LETI with scheduled Cardiology appointment of 8-, and contact number provided for any questions. My Chart message to be sent.

## 2024-08-13 ENCOUNTER — HOSPITAL ENCOUNTER (OUTPATIENT)
Dept: CARDIOLOGY | Facility: HOSPITAL | Age: 69
Discharge: HOME | End: 2024-08-13
Attending: STUDENT IN AN ORGANIZED HEALTH CARE EDUCATION/TRAINING PROGRAM
Payer: MEDICARE

## 2024-08-13 ENCOUNTER — HOSPITAL ENCOUNTER (OUTPATIENT)
Dept: RADIOLOGY | Facility: HOSPITAL | Age: 69
Discharge: HOME | End: 2024-08-13
Attending: STUDENT IN AN ORGANIZED HEALTH CARE EDUCATION/TRAINING PROGRAM
Payer: MEDICARE

## 2024-08-13 VITALS
DIASTOLIC BLOOD PRESSURE: 71 MMHG | HEIGHT: 64 IN | BODY MASS INDEX: 28.17 KG/M2 | SYSTOLIC BLOOD PRESSURE: 131 MMHG | HEART RATE: 80 BPM | WEIGHT: 165 LBS

## 2024-08-13 DIAGNOSIS — I35.0 MILD AORTIC STENOSIS: ICD-10-CM

## 2024-08-13 DIAGNOSIS — F17.210 NICOTINE DEPENDENCE, CIGARETTES, UNCOMPLICATED: ICD-10-CM

## 2024-08-13 LAB
ASCENDING AORTA: 2.96 CM
AV INDEX (PROSTH): 0.42
AV MEAN GRADIENT: 21 MMHG
AV PEAK GRADIENT: 29 MMHG
AV VALVE AREA BY VELOCITY RATIO: 1.1 CM²
AV VALVE AREA: 1.27 CM²
AV VELOCITY RATIO: 0.37
BSA FOR ECHO PROCEDURE: 1.84 M2
CV ECHO LV RWT: 0.33 CM
DOP CALC AO PEAK VEL: 2.68 M/S
DOP CALC AO VTI: 60.53 CM
DOP CALC LVOT AREA: 3 CM2
DOP CALC LVOT DIAMETER: 1.95 CM
DOP CALC LVOT PEAK VEL: 0.99 M/S
DOP CALC LVOT STROKE VOLUME: 76.62 CM3
DOP CALCLVOT PEAK VEL VTI: 25.67 CM
E WAVE DECELERATION TIME: 202.03 MSEC
E/A RATIO: 0.75
E/E' RATIO: 17.83 M/S
ECHO LV POSTERIOR WALL: 0.82 CM (ref 0.6–1.1)
FRACTIONAL SHORTENING: 28 % (ref 28–44)
INTERVENTRICULAR SEPTUM: 1.03 CM (ref 0.6–1.1)
LA MAJOR: 4.81 CM
LA MINOR: 4.69 CM
LA WIDTH: 4.02 CM
LEFT ARM DIASTOLIC BLOOD PRESSURE: 67 MMHG
LEFT ARM SYSTOLIC BLOOD PRESSURE: 130 MMHG
LEFT ATRIUM SIZE: 3.85 CM
LEFT ATRIUM VOLUME INDEX MOD: 28.8 ML/M2
LEFT ATRIUM VOLUME INDEX: 34.7 ML/M2
LEFT ATRIUM VOLUME MOD: 51.79 CM3
LEFT ATRIUM VOLUME: 62.48 CM3
LEFT CBA DIAS: 21 CM/S
LEFT CBA SYS: 79 CM/S
LEFT CCA DIST DIAS: 19 CM/S
LEFT CCA DIST SYS: 84 CM/S
LEFT CCA MID DIAS: 19 CM/S
LEFT CCA MID SYS: 93 CM/S
LEFT CCA PROX DIAS: 17 CM/S
LEFT CCA PROX SYS: 86 CM/S
LEFT ECA DIAS: 14 CM/S
LEFT ECA SYS: 92 CM/S
LEFT ICA DIST DIAS: 24 CM/S
LEFT ICA DIST SYS: 84 CM/S
LEFT ICA MID DIAS: 22 CM/S
LEFT ICA MID SYS: 80 CM/S
LEFT ICA PROX DIAS: 19 CM/S
LEFT ICA PROX SYS: 71 CM/S
LEFT INTERNAL DIMENSION IN SYSTOLE: 3.59 CM (ref 2.1–4)
LEFT VENTRICLE DIASTOLIC VOLUME INDEX: 64.44 ML/M2
LEFT VENTRICLE DIASTOLIC VOLUME: 115.99 ML
LEFT VENTRICLE MASS INDEX: 90 G/M2
LEFT VENTRICLE SYSTOLIC VOLUME INDEX: 30 ML/M2
LEFT VENTRICLE SYSTOLIC VOLUME: 53.96 ML
LEFT VENTRICULAR INTERNAL DIMENSION IN DIASTOLE: 4.96 CM (ref 3.5–6)
LEFT VENTRICULAR MASS: 161.84 G
LEFT VERTEBRAL DIAS: 11 CM/S
LEFT VERTEBRAL SYS: 47 CM/S
LV LATERAL E/E' RATIO: 17.83 M/S
LV SEPTAL E/E' RATIO: 17.83 M/S
MV A" WAVE DURATION": 8.28 MSEC
MV PEAK A VEL: 1.42 M/S
MV PEAK E VEL: 1.07 M/S
MV STENOSIS PRESSURE HALF TIME: 58.59 MS
MV VALVE AREA P 1/2 METHOD: 3.75 CM2
OHS CV CAROTID RIGHT ICA EDV HIGHEST: 33
OHS CV CAROTID ULTRASOUND LEFT ICA/CCA RATIO: 1
OHS CV CAROTID ULTRASOUND RIGHT ICA/CCA RATIO: 1.34
OHS CV PV CAROTID LEFT HIGHEST CCA: 93
OHS CV PV CAROTID LEFT HIGHEST ICA: 84
OHS CV PV CAROTID RIGHT HIGHEST CCA: 89
OHS CV PV CAROTID RIGHT HIGHEST ICA: 106
OHS CV US CAROTID LEFT HIGHEST EDV: 24
PISA TR MAX VEL: 2.57 M/S
PULM VEIN S/D RATIO: 1.24
PV PEAK D VEL: 0.41 M/S
PV PEAK S VEL: 0.51 M/S
RA MAJOR: 4.58 CM
RA PRESSURE ESTIMATED: 3 MMHG
RA WIDTH: 3.09 CM
RIGHT ARM DIASTOLIC BLOOD PRESSURE: 71 MMHG
RIGHT ARM SYSTOLIC BLOOD PRESSURE: 131 MMHG
RIGHT CBA DIAS: 18 CM/S
RIGHT CBA SYS: 64 CM/S
RIGHT CCA DIST DIAS: 19 CM/S
RIGHT CCA DIST SYS: 79 CM/S
RIGHT CCA MID DIAS: 19 CM/S
RIGHT CCA MID SYS: 72 CM/S
RIGHT CCA PROX DIAS: 15 CM/S
RIGHT CCA PROX SYS: 89 CM/S
RIGHT ECA DIAS: 14 CM/S
RIGHT ECA SYS: 112 CM/S
RIGHT ICA DIST DIAS: 33 CM/S
RIGHT ICA DIST SYS: 106 CM/S
RIGHT ICA MID DIAS: 22 CM/S
RIGHT ICA MID SYS: 77 CM/S
RIGHT ICA PROX DIAS: 15 CM/S
RIGHT ICA PROX SYS: 63 CM/S
RIGHT VENTRICLE DIASTOLIC BASEL DIMENSION: 3.1 CM
RIGHT VERTEBRAL DIAS: 12 CM/S
RIGHT VERTEBRAL SYS: 91 CM/S
RV TB RVSP: 6 MMHG
SINUS: 2.63 CM
STJ: 2.13 CM
TDI LATERAL: 0.06 M/S
TDI SEPTAL: 0.06 M/S
TDI: 0.06 M/S
TR MAX PG: 26 MMHG
TRICUSPID ANNULAR PLANE SYSTOLIC EXCURSION: 1.99 CM
TV REST PULMONARY ARTERY PRESSURE: 29 MMHG
Z-SCORE OF LEFT VENTRICULAR DIMENSION IN END DIASTOLE: -0.03
Z-SCORE OF LEFT VENTRICULAR DIMENSION IN END SYSTOLE: 1.22

## 2024-08-13 PROCEDURE — 93306 TTE W/DOPPLER COMPLETE: CPT | Mod: 26,,, | Performed by: STUDENT IN AN ORGANIZED HEALTH CARE EDUCATION/TRAINING PROGRAM

## 2024-08-13 PROCEDURE — 71271 CT THORAX LUNG CANCER SCR C-: CPT | Mod: TC

## 2024-08-13 PROCEDURE — 93306 TTE W/DOPPLER COMPLETE: CPT

## 2024-08-13 PROCEDURE — 71271 CT THORAX LUNG CANCER SCR C-: CPT | Mod: 26,,, | Performed by: RADIOLOGY

## 2024-08-13 PROCEDURE — 93880 EXTRACRANIAL BILAT STUDY: CPT

## 2024-08-13 PROCEDURE — 93880 EXTRACRANIAL BILAT STUDY: CPT | Mod: 26,,, | Performed by: INTERNAL MEDICINE

## 2024-08-18 DIAGNOSIS — M81.0 AGE-RELATED OSTEOPOROSIS WITHOUT CURRENT PATHOLOGICAL FRACTURE: Primary | ICD-10-CM

## 2024-08-19 RX ORDER — ALENDRONATE SODIUM 70 MG/1
70 TABLET ORAL
Qty: 12 TABLET | Refills: 3 | Status: SHIPPED | OUTPATIENT
Start: 2024-08-19

## 2024-08-24 DIAGNOSIS — E78.2 MIXED HYPERLIPIDEMIA: ICD-10-CM

## 2024-08-26 RX ORDER — ATORVASTATIN CALCIUM 80 MG/1
80 TABLET, FILM COATED ORAL
Qty: 90 TABLET | Refills: 3 | Status: SHIPPED | OUTPATIENT
Start: 2024-08-26 | End: 2024-08-30 | Stop reason: SDUPTHER

## 2024-08-30 ENCOUNTER — OFFICE VISIT (OUTPATIENT)
Dept: CARDIOLOGY | Facility: CLINIC | Age: 69
End: 2024-08-30
Payer: MEDICARE

## 2024-08-30 VITALS
HEIGHT: 64 IN | BODY MASS INDEX: 28.15 KG/M2 | DIASTOLIC BLOOD PRESSURE: 74 MMHG | WEIGHT: 164.88 LBS | HEART RATE: 76 BPM | SYSTOLIC BLOOD PRESSURE: 133 MMHG

## 2024-08-30 DIAGNOSIS — I10 ESSENTIAL HYPERTENSION: Primary | ICD-10-CM

## 2024-08-30 DIAGNOSIS — I25.10 CORONARY ARTERY DISEASE INVOLVING NATIVE CORONARY ARTERY OF NATIVE HEART WITHOUT ANGINA PECTORIS: ICD-10-CM

## 2024-08-30 DIAGNOSIS — E78.2 MIXED HYPERLIPIDEMIA: ICD-10-CM

## 2024-08-30 DIAGNOSIS — I70.0 AORTIC ATHEROSCLEROSIS: ICD-10-CM

## 2024-08-30 DIAGNOSIS — I35.0 MILD AORTIC STENOSIS: ICD-10-CM

## 2024-08-30 PROCEDURE — 99999 PR PBB SHADOW E&M-EST. PATIENT-LVL III: CPT | Mod: PBBFAC,,, | Performed by: INTERNAL MEDICINE

## 2024-08-30 PROCEDURE — 99213 OFFICE O/P EST LOW 20 MIN: CPT | Mod: PBBFAC | Performed by: INTERNAL MEDICINE

## 2024-08-30 RX ORDER — ATORVASTATIN CALCIUM 80 MG/1
80 TABLET, FILM COATED ORAL DAILY
Qty: 90 TABLET | Refills: 3 | Status: SHIPPED | OUTPATIENT
Start: 2024-08-30

## 2024-08-30 RX ORDER — EZETIMIBE 10 MG/1
10 TABLET ORAL DAILY
Qty: 90 TABLET | Refills: 3 | Status: SHIPPED | OUTPATIENT
Start: 2024-08-30

## 2024-08-30 NOTE — PROGRESS NOTES
HISTORY:    67 yo F h/o mild AS, CAD on CT, hypertension, hyperlipidemia, current tob presenting for follow-up.    Seen in mid '23 for new dx of AS.     The patient denies any symptoms of chest pain, shortness of breath, or dyspnea on exertion.    Activity levels are moderate. Gardens, walks, and completes ADLs without issue.    The patient denies any previous history of myocardial infarction, stroke, congestive heart failure, or cardiomyopathy.    Tolerates aspirin 81 x 1, amlodipine 10 x 1, hydrochlorothiazide 12.5 x 1, and atorvastatin 80x1.    PHYSICAL EXAM:    Vitals:    08/30/24 1408   BP: 133/74   Pulse: 76       NAD, A+Ox3.  No jvd, no bruit.  RRR nml s1,s2. 2/6 AS murmur.  CTA B no wheezes or crackles.  No edema.    LABS/STUDIES (imaging reviewed during clinic visit):    December 2023 CBC and CMP normal.  /HDL 56/LDL 76/TG 58.  A1c and TSH normal.    EKG 2023 demonstrates sinus rhythm with no Q-waves or ST changes.  TTE August 2024 normal LV size with EF of 50-55%.  Mild aortic stenosis with a peak velocity 2.7 m/sec.  Mild MR.  CVP 3.  August 2023 normal LV size and function with an EF of 55 60%.  Mild aortic stenosis with a peak velocity of 2.5 m/sec.  Mild AI.  Mild MR.    CT chest August 2024 normal size heart.  Aortic and coronary atherosclerosis.  Carotid ultrasound August 2024 atherosclerosis with no evidence of significant ICA disease bilaterally.      ASSESSMENT & PLAN:    1. Essential hypertension    2. Mild aortic stenosis    3. Mixed hyperlipidemia    4. Aortic atherosclerosis    5. Coronary artery disease involving native coronary artery of native heart without angina pectoris    6. Mixed hyperlipidemia        Orders Placed This Encounter    ezetimibe (ZETIA) 10 mg tablet    atorvastatin (LIPITOR) 80 MG tablet          Mild, asymptomatic AS stable.    Bps controlled on amlodipine/hctz.    LDL 75 on atorvastatin 80x1. Add ezetimibe 10x1.    Asymptomatic CAD. Focusing on RF modification  as described above.    Discussed benefits of tob cessation.     Follow up in about 1 year (around 8/30/2025).      Suzanne Noe MD

## 2024-09-09 ENCOUNTER — PATIENT MESSAGE (OUTPATIENT)
Dept: CARDIOLOGY | Facility: CLINIC | Age: 69
End: 2024-09-09
Payer: MEDICARE

## 2024-10-07 ENCOUNTER — OFFICE VISIT (OUTPATIENT)
Dept: OBSTETRICS AND GYNECOLOGY | Facility: CLINIC | Age: 69
End: 2024-10-07
Payer: MEDICARE

## 2024-10-07 VITALS
DIASTOLIC BLOOD PRESSURE: 78 MMHG | SYSTOLIC BLOOD PRESSURE: 130 MMHG | WEIGHT: 162.25 LBS | BODY MASS INDEX: 27.85 KG/M2

## 2024-10-07 DIAGNOSIS — Z12.4 ENCOUNTER FOR SCREENING FOR CERVICAL CANCER: ICD-10-CM

## 2024-10-07 DIAGNOSIS — Z01.419 ENCOUNTER FOR WELL WOMAN EXAM WITH ROUTINE GYNECOLOGICAL EXAM: Primary | ICD-10-CM

## 2024-10-07 DIAGNOSIS — Z11.51 SCREENING FOR HUMAN PAPILLOMAVIRUS (HPV): ICD-10-CM

## 2024-10-07 PROCEDURE — 87624 HPV HI-RISK TYP POOLED RSLT: CPT

## 2024-10-07 PROCEDURE — 99213 OFFICE O/P EST LOW 20 MIN: CPT | Mod: PBBFAC

## 2024-10-07 PROCEDURE — 99999 PR PBB SHADOW E&M-EST. PATIENT-LVL III: CPT | Mod: PBBFAC,,,

## 2024-10-07 NOTE — PROGRESS NOTES
Chief Complaint: Well Woman Exam     HPI:      Kristy M Wallisch is a 68 y.o.  who presents today for well woman exam.  She is a new patient.  Denies any significant gyn hx.  Unsure of last gyn appointment.  LMP: No LMP recorded. Patient is postmenopausal.  Denies any hx of PMB.  Hx of osteopenia on Fosamax since 2023 for increased risk scores. UTD w/ PCP - Dr. Carbajal. No issues, problems, or complaints. Specifically, patient denies abnormal vaginal bleeding, discharge, pelvic pain, urinary problems, or changes in appetite. Ms. Wallisch is currently sexually active with a single male partner.     Previous Pap: Negative per patient (???)  - denies hx of abnormal pap smears  HRT hx:  No  Previous Mammogram: BiRads: 1 T-C Score: 11.92% (2023) - denies hx of abnormal mammograms  Most Recent Dexa: Osteopenia (2023), Repeat in   Most Recent Colonoscopy: Benign polyps (2023), Repeat in     STD/STI Hx: Denies any history of STD's  Tobacco use:  Yes - current, every days for last 50 years (0.5 ppd, 25.4 pack-years)  Alcohol use:  Yes - 2-3 glasses of wine/week  Exercise regimen:  Stays active - walking, gardening    FH:  Breast cancer: paternal grandmother, paternal aunt  Colon cancer: none  Ovarian cancer: none  Endometrial cancer: none    Ms. Wallisch confirms that she wears her seatbelt when riding in the car and does not text while driving.     OB History          0    Para   0    Term   0       0    AB   0    Living   0         SAB   0    IAB   0    Ectopic   0    Multiple   0    Live Births   0                 ROS:     GENERAL: Denies unintentional weight gain or weight loss. Feeling well overall.   SKIN: Denies rash or lesions.   HEENT: Denies headaches, or vision changes.   CARDIOVASCULAR: Denies palpitations or chest pain.   RESPIRATORY: Denies shortness of breath or dyspnea on exertion.  BREASTS: Denies lumps or nipple discharge.   ABDOMEN: Denies constipation,  diarrhea, nausea, vomiting, change in appetite.  URINARY: Denies frequency, dysuria.  NEUROLOGIC: Denies syncope or weakness.   PSYCHIATRIC: Denies uncontrolled depression or anxiety.    Physical Exam:      PHYSICAL EXAM:  /78 (BP Location: Left arm, Patient Position: Sitting)   Wt 73.6 kg (162 lb 4.1 oz)   BMI 27.85 kg/m²   Body mass index is 27.85 kg/m².     APPEARANCE: Well nourished, well developed, in no acute distress.  PSYCH: Appropriate mood and affect.  SKIN: No acne or hirsutism  NECK: Neck symmetric without masses  NODES: No inguinal, axillary, or supraclavicular lymph node enlargement  ABDOMEN: Soft.  No tenderness or masses.    CARDIOVASCULAR: No edema of peripheral extremities  BREASTS: Symmetrical, no visible skin lesions. No palpable masses. No nipple discharge bilaterally.  PELVIC: Normal external genitalia without lesions.  Normal hair distribution.  Adequate perineal body, normal urethral meatus.  Vagina moist and smooth. Without lesions. Vagina without discharge.  Cervix atrophic, without lesions, discharge or tenderness.  No significant cystocele or rectocele.  Bimanual exam shows uterus to be normal size, regular, mobile and nontender.  Adnexa without masses or tenderness.    Gyn note:      A female chaperone was present for the pelvic exam.     Assessment/Plan:     Encounter for well woman exam with routine gynecological exam  -     Counseled patient regarding healthy diet and regular exercise, daily multivitamin, daily seat belt use.   -     BP normotensive  -     She denies abuse and feels safe at home.  -     Pap smear:  Discussed pap smear today and if normal and negative, a pap is no longer indicated.  Pt in agreement with plan.  Pap performed.  -     MMG:  UTD - orders per pcp, scheduled in December 2024  -     Colon Cancer Screening: UTD, repeat in 2030    -     DEXA screening:  UTD - per PCP    Encounter for screening for cervical cancer  -     Liquid-Based Pap Smear,  Screening    Screening for human papillomavirus (HPV)  -     HPV High Risk Genotypes, PCR    Follow up in about 1 year for annual exam or sooner PRN.    Counseling:     Patient was counseled today on current ASCCP pap guidelines, the recommendation for yearly physical exams, healthy diet and exercise routines, safe driving habits, and breast self awareness and annual mammograms. She is to see her PCP for other health maintenance.     Use of the [employer] Patient Portal discussed and encouraged during today's visit.   Counseling time: 15 minutes    Charmaine Porter (Maggie), CIPRIANO  Obstetrics and Gynecology  [facility]

## 2024-10-07 NOTE — PROGRESS NOTES
"Chief Complaint: Well Woman Exam     HPI:      Kristy M Wallisch is a 68 y.o.  who presents today for well woman exam.  LMP: No LMP recorded. Patient is postmenopausal.  No issues, problems, or complaints. Specifically, patient denies abnormal vaginal bleeding, discharge, pelvic pain, urinary problems, or changes in appetite. Ms. Wallisch {Blank single:21552::"has never been sexually active","is not currently sexually active","is currently sexually active with multiple partners","is currently sexually active with a single female partner","is currently sexually active with a single male partner"}. She is currently using {BCS Contraception List:67080} for contraception. She {Blank single:06411::"declines","would like"} STD screening today.    Previous Pap: {BCSLPapResults:14758::"NILM, HPV negative"} (No result found)  HRT hx:  Previous Mammogram: BiRads: {NUMBER 1-5:94728} T-C Score: *** (***) Results for orders placed during the hospital encounter of 23    Mammo Digital Screening Bilat w/ Vijay    Narrative  Result:  Mammo Digital Screening Bilat w/ Vijay    History:  Patient is 68 y.o. and is seen for a screening mammogram.      Films Compared:  Compared to: 2022 Mammo Digital Screening Bilat w/ Vijay, 2021 Mammo Digital Screening Bilat w/ Vijay, and 2020 Mammo Digital Screening Bilat w/ Vijay    Findings:  This procedure was performed using tomosynthesis.  Computer-aided detection was utilized in the interpretation of this examination.    The breasts are heterogeneously dense, which may obscure small masses. There is no evidence of suspicious masses, microcalcifications or architectural distortion.    Impression  No mammographic evidence of malignancy.    BI-RADS Category 1: Negative    Recommendation:  Routine screening mammogram in 1 year is recommended.    Your estimated lifetime risk of breast cancer (to age 85) based on Tyrer-Cuzick risk assessment model is 11.92 %.  According to the " "American Cancer Society, patients with a lifetime breast cancer risk of 20% or higher might benefit from supplemental screening tests, such as screening breast MRI.     Most Recent Dexa: {Blank single:61120::"WNL","Osteoporosis, pt on ***","Osteopenia"} (***/***), Repeat in 20***  Most Recent Colonoscopy: {Blank single:78925::"WNL","WNL per patient","Benign polyps"} (***/***), Repeat in 20***    STD/STI Hx: Denies any history of STD's  Tobacco use:    Alcohol use:   Exercise regimen:   Employment:     FH:  Breast cancer: none  Colon cancer: none  Ovarian cancer: none  Endometrial cancer: none    Gardasil:{Blank single:::"Incomplete ***/3","has never had","Pt Unsure","Completed"}     Ms. Wallisch confirms that she wears her seatbelt when riding in the car and {does/does not:41580} text while driving.     OB History          0    Para   0    Term   0       0    AB   0    Living   0         SAB   0    IAB   0    Ectopic   0    Multiple   0    Live Births   0                 ROS:     GENERAL: Denies unintentional weight gain or weight loss. Feeling well overall.   SKIN: Denies rash or lesions.   HEENT: Denies headaches, or vision changes.   CARDIOVASCULAR: Denies palpitations or chest pain.   RESPIRATORY: Denies shortness of breath or dyspnea on exertion.  BREASTS: Denies lumps or nipple discharge.   ABDOMEN: Denies constipation, diarrhea, nausea, vomiting, change in appetite.  URINARY: Denies frequency, dysuria.  NEUROLOGIC: Denies syncope or weakness.   PSYCHIATRIC: Denies uncontrolled depression or anxiety.    Physical Exam:      PHYSICAL EXAM:  /78 (BP Location: Left arm, Patient Position: Sitting)   Wt 73.6 kg (162 lb 4.1 oz)   BMI 27.85 kg/m²   Body mass index is 27.85 kg/m².     APPEARANCE: Well nourished, well developed, in no acute distress.  PSYCH: Appropriate mood and affect.  SKIN: No acne or hirsutism  NECK: Neck symmetric without masses  NODES: No inguinal, axillary, or " "supraclavicular lymph node enlargement  ABDOMEN: Soft.  No tenderness or masses.    CARDIOVASCULAR: No edema of peripheral extremities  BREASTS: Symmetrical, {Blank single:15459::"well healed surgical scars","no visible skin lesions"}. {Blank single:23026::"Implants symmetric without defects noted","Lumpy bumpy feel to bilateral breasts","No palpable masses"}. No nipple discharge bilaterally.  PELVIC: Normal external genitalia without lesions.  Normal hair distribution.  Adequate perineal body, normal urethral meatus.  Vagina {Blank single:86925::"moist and smooth","moist and well rugated"}. Without lesions. Vagina {WITH-WITHOUT:99707} discharge.  {Blank single:69479::"Normal appearing vaginal cuff","Cervix pink, without lesions, discharge or tenderness"}.  No significant cystocele or rectocele.  Bimanual exam shows {Blank single:95528::"no midline or adnexal masses","uterus to be normal size, regular, mobile and nontender.  Adnexa without masses or tenderness"}.    Gyn note:      A female chaperone was present for the breast and pelvic exam.     Assessment/Plan:     There are no diagnoses linked to this encounter.    No follow-ups on file.  -     Counseled patient regarding healthy diet and regular exercise, daily multivitamin, daily seat belt use.   -     BP normotensive  -     She denies abuse and feels safe at home.  -     Pap smear:  *** (next due ***)  -     Contraception:  ***  -     STD screening:  ***   -     MMG:  *** (next due ***)  -     Colon Cancer Screening: ***    -     DEXA screening:  no indication due to age (<66y/o) and no additional risk factors e.g. previous fragility facture, glucocorticoid use, parental history of hip fracture, low body weight (<127 lbs), current cigarette use, excessive alcohol consumption, rheumatoid arthritis, secondary osteoporosis (malabsorption, inflammatory bowel disease, chronic liver disease).    Follow up in about 1 year for annual exam or sooner PRN.    Counseling: " "    Patient was counseled today on current ASCCP pap guidelines, the recommendation for yearly physical exams, healthy diet and exercise routines, safe driving habits, and {Blank multiple:84491::"breast self awareness","annual mammograms"}. She is to see her PCP for other health maintenance.     Use of the [employer] Patient Portal discussed and encouraged during today's visit.   Counseling time: {15 30 45 55 MINUTES:54694}    Charmaine Porter (Maggie), CIPRIANO  Obstetrics and Gynecology  [facility]       "

## 2024-10-09 ENCOUNTER — PATIENT MESSAGE (OUTPATIENT)
Dept: ADMINISTRATIVE | Facility: OTHER | Age: 69
End: 2024-10-09
Payer: MEDICARE

## 2024-10-11 LAB
HPV HR 12 DNA SPEC QL NAA+PROBE: NEGATIVE
HPV16 AG SPEC QL: NEGATIVE
HPV18 DNA SPEC QL NAA+PROBE: NEGATIVE

## 2024-10-18 NOTE — TELEPHONE ENCOUNTER
AVS reviewed virtually with patient. Patient verbalized understanding of same, including medications, follow-up appointments, and reasons to seek medical assistance. All questions answered.   Pt reports bp reading taken on 09/17/23 at 135/65.    Lamine Reyes  Panel Care Coordinator  [facility]  P: [phone]  F: [phone]

## 2024-11-14 ENCOUNTER — OFFICE VISIT (OUTPATIENT)
Dept: DERMATOLOGY | Facility: CLINIC | Age: 69
End: 2024-11-14
Payer: MEDICARE

## 2024-11-14 DIAGNOSIS — B07.9 VIRAL WARTS, UNSPECIFIED TYPE: Primary | ICD-10-CM

## 2024-11-14 DIAGNOSIS — D18.01 CHERRY ANGIOMA: ICD-10-CM

## 2024-11-14 DIAGNOSIS — D22.9 MULTIPLE BENIGN NEVI: ICD-10-CM

## 2024-11-14 DIAGNOSIS — L81.4 LENTIGO: ICD-10-CM

## 2024-11-14 DIAGNOSIS — L82.1 SEBORRHEIC KERATOSES: ICD-10-CM

## 2024-11-14 DIAGNOSIS — Z12.83 SKIN CANCER SCREENING: ICD-10-CM

## 2024-11-14 DIAGNOSIS — D23.9 DERMATOFIBROMA: ICD-10-CM

## 2024-11-14 PROCEDURE — 99999 PR PBB SHADOW E&M-EST. PATIENT-LVL III: CPT | Mod: PBBFAC,,, | Performed by: STUDENT IN AN ORGANIZED HEALTH CARE EDUCATION/TRAINING PROGRAM

## 2024-11-14 PROCEDURE — 99213 OFFICE O/P EST LOW 20 MIN: CPT | Mod: PBBFAC,25 | Performed by: STUDENT IN AN ORGANIZED HEALTH CARE EDUCATION/TRAINING PROGRAM

## 2024-11-14 PROCEDURE — 17110 DESTRUCTION B9 LES UP TO 14: CPT | Mod: PBBFAC | Performed by: STUDENT IN AN ORGANIZED HEALTH CARE EDUCATION/TRAINING PROGRAM

## 2024-11-14 NOTE — PROGRESS NOTES
Subjective:      Patient ID:  Kristy M Wallisch is a 69 y.o. female who presents for   Chief Complaint   Patient presents with    Skin Check     Kristy M Wallisch is a 69 y.o. female who presents for: FBSE screening exam for skin cancer.    New patient    The patient has the following lesions of concern:  C/o dry area to the buttock.    Pertinent history:  History of blistering sunburns: Yes  History of tanning bed use: No  Family history of melanoma: No  Personal history of mole removal: Yes  Personal history of skin cancer: No         Review of Systems   Skin:  Positive for activity-related sunscreen use and wears hat. Negative for daily sunscreen use and recent sunburn.   Hematologic/Lymphatic: Bruises/bleeds easily (bruise).       Objective:   Physical Exam   Constitutional: She appears well-developed and well-nourished. No distress.   Neurological: She is alert and oriented to person, place, and time. She is not disoriented.   Psychiatric: She has a normal mood and affect.   Skin:   Areas Examined (abnormalities noted in diagram):   Scalp / Hair Palpated and Inspected  Head / Face Inspection Performed  Neck Inspection Performed  Chest / Axilla Inspection Performed  Abdomen Inspection Performed  Genitals / Buttocks / Groin Inspection Performed  Back Inspection Performed  RUE Inspected  LUE Inspection Performed  RLE Inspected  LLE Inspection Performed  Nails and Digits Inspection Performed                     Diagram Legend     Erythematous scaling macule/papule c/w actinic keratosis       Vascular papule c/w angioma      Pigmented verrucoid papule/plaque c/w seborrheic keratosis      Yellow umbilicated papule c/w sebaceous hyperplasia      Irregularly shaped tan macule c/w lentigo     1-2 mm smooth white papules consistent with Milia      Movable subcutaneous cyst with punctum c/w epidermal inclusion cyst      Subcutaneous movable cyst c/w pilar cyst      Firm pink to brown papule c/w dermatofibroma       Pedunculated fleshy papule(s) c/w skin tag(s)      Evenly pigmented macule c/w junctional nevus     Mildly variegated pigmented, slightly irregular-bordered macule c/w mildly atypical nevus      Flesh colored to evenly pigmented papule c/w intradermal nevus       Pink pearly papule/plaque c/w basal cell carcinoma      Erythematous hyperkeratotic cursted plaque c/w SCC      Surgical scar with no sign of skin cancer recurrence      Open and closed comedones      Inflammatory papules and pustules      Verrucoid papule consistent consistent with wart     Erythematous eczematous patches and plaques     Dystrophic onycholytic nail with subungual debris c/w onychomycosis     Umbilicated papule    Erythematous-base heme-crusted tan verrucoid plaque consistent with inflamed seborrheic keratosis     Erythematous Silvery Scaling Plaque c/w Psoriasis     See annotation      Assessment / Plan:        Viral warts, unspecified type  - verrucous papule--condyloma vs SK on buttocks. Treated with LN2 as below. RTC if not resolved    Cryosurgery procedure note:    Verbal consent from the patient is obtained including, but not limited to, risk of hypopigmentation/hyperpigmentation, scar, recurrence of lesion. Liquid nitrogen cryosurgery is applied to 1 lesions to produce a freeze injury. The patient is aware that blisters may form and is instructed on wound care with gentle cleansing and use of vaseline ointment to keep moist until healed. The patient is supplied a handout on cryosurgery and is instructed to call if lesions do not completely resolve.    Dermatofibroma  Multiple benign nevi  Cherry angioma  Seborrheic keratoses  Lentigo  Reassurance given to patient. No treatment is necessary.   Treatment of benign, asymptomatic lesions may be considered cosmetic.    Skin cancer screening  Total body skin examination performed today including at least 12 points as noted in physical examination. No lesions suspicious for malignancy  noted.    Recommend daily sun protection/avoidance, use of at least SPF 30, broad spectrum sunscreen (OTC drug), skin self examinations, and routine physician surveillance to optimize early detection             Follow up in about 1 year (around 11/14/2025) for TBSE.

## 2024-11-14 NOTE — PATIENT INSTRUCTIONS

## 2024-12-06 ENCOUNTER — PATIENT MESSAGE (OUTPATIENT)
Dept: INTERNAL MEDICINE | Facility: CLINIC | Age: 69
End: 2024-12-06
Payer: MEDICARE

## 2024-12-06 DIAGNOSIS — I10 ESSENTIAL HYPERTENSION: ICD-10-CM

## 2024-12-06 RX ORDER — HYDROCHLOROTHIAZIDE 12.5 MG/1
12.5 TABLET ORAL DAILY
Qty: 90 TABLET | Refills: 3 | Status: SHIPPED | OUTPATIENT
Start: 2024-12-06

## 2024-12-06 RX ORDER — AMLODIPINE BESYLATE 10 MG/1
10 TABLET ORAL DAILY
Qty: 90 TABLET | Refills: 3 | Status: SHIPPED | OUTPATIENT
Start: 2024-12-06

## 2024-12-06 NOTE — TELEPHONE ENCOUNTER
----- Message from Med Assistant Gutierrez sent at 12/6/2024  3:05 PM CST -----  Who called:  Pt   What is the request in detail:  Needs status on refills , needs a weeks worth due to travel   Can the clinic reply by [facility]? No  No   Would the patient rather a call back or a response via [facility]? Call back  callback  Best call back number:  Telephone Information:  Mobile          [phone]     Additional Information:    Thank you.

## 2024-12-06 NOTE — TELEPHONE ENCOUNTER
----- Message from Med Assistant Gutierrez sent at 12/6/2024  3:05 PM CST -----  Who called:  Pt   What is the request in detail:  Needs status on refills , needs a weeks worth due to travel   Can the clinic reply by [facility]? No  No   Would the patient rather a call back or a response via [facility]? Call back  callback  Best call back number:  Telephone Information:  Mobile          [phone]     Additional Information:    Thank you.

## 2024-12-06 NOTE — TELEPHONE ENCOUNTER
Refill Encounter    PCP Visits: Recent Visits  Date Type Provider Dept   08/01/24 Office Visit Leslie Carbajal MD [facility] Internal Medicine   Showing recent visits within past 360 days and meeting all other requirements  Future Appointments  No visits were found meeting these conditions.  Showing future appointments within next 720 days and meeting all other requirements     Last 3 Blood Pressure:   BP Readings from Last 3 Encounters:   10/07/24 130/78   08/30/24 133/74   08/13/24 131/71     Preferred Pharmacy:   [id number] - [facility] [address]  [address]  [facility] [address]  Phone: [phone] Fax: [phone]    Requested RX:  Requested Prescriptions     Pending Prescriptions Disp Refills    amLODIPine (NORVASC) 10 MG tablet 90 tablet 3     Sig: Take 1 tablet (10 mg total) by mouth once daily.    hydroCHLOROthiazide (HYDRODIURIL) 12.5 MG Tab 90 tablet 3     Sig: Take 1 tablet (12.5 mg total) by mouth once daily.      RX Route: Normal

## 2024-12-06 NOTE — TELEPHONE ENCOUNTER
Care Due:                  Date            Visit Type   Department     Provider  --------------------------------------------------------------------------------                                MYCHART                              ANNUAL                              CHECKUP/PHY  [facility] INTERNAL  Last Visit: 08-      [facility]       Leslie Carbajal  Next Visit: None Scheduled  None         None Found                                                            Last  Test          Frequency    Reason                     Performed    Due Date  --------------------------------------------------------------------------------    CMP.........  12 months..  alendronate,               12- 12-                             hydroCHLOROthiazide......    Mg Level....  12 months..  alendronate..............  Not Found    Overdue    Phosphate...  12 months..  alendronate..............  Not Found    Overdue    [employer] Embedded Care Due Messages. Reference number: [id number].   12/06/2024 1:14:13 PM CST

## 2025-01-06 ENCOUNTER — HOSPITAL ENCOUNTER (OUTPATIENT)
Dept: RADIOLOGY | Facility: OTHER | Age: 70
Discharge: HOME | End: 2025-01-06
Attending: STUDENT IN AN ORGANIZED HEALTH CARE EDUCATION/TRAINING PROGRAM
Payer: MEDICARE

## 2025-01-06 DIAGNOSIS — Z12.31 SCREENING MAMMOGRAM FOR BREAST CANCER: ICD-10-CM

## 2025-01-06 PROCEDURE — 77067 SCR MAMMO BI INCL CAD: CPT | Mod: 26,,, | Performed by: RADIOLOGY

## 2025-01-06 PROCEDURE — 77063 BREAST TOMOSYNTHESIS BI: CPT | Mod: TC

## 2025-01-06 PROCEDURE — 77063 BREAST TOMOSYNTHESIS BI: CPT | Mod: 26,,, | Performed by: RADIOLOGY

## 2025-03-20 ENCOUNTER — PATIENT MESSAGE (OUTPATIENT)
Dept: ADMINISTRATIVE | Facility: OTHER | Age: 70
End: 2025-03-20
Payer: MEDICARE

## 2025-03-24 DIAGNOSIS — Z00.00 ENCOUNTER FOR MEDICARE ANNUAL WELLNESS EXAM: ICD-10-CM

## 2025-04-17 ENCOUNTER — OFFICE VISIT (OUTPATIENT)
Dept: OPTOMETRY | Facility: CLINIC | Age: 70
End: 2025-04-17
Payer: MEDICARE

## 2025-04-17 DIAGNOSIS — H52.03 HYPEROPIA WITH ASTIGMATISM AND PRESBYOPIA, BILATERAL: ICD-10-CM

## 2025-04-17 DIAGNOSIS — H04.123 DRY EYE SYNDROME OF BOTH EYES: ICD-10-CM

## 2025-04-17 DIAGNOSIS — H10.13 ALLERGIC CONJUNCTIVITIS OF BOTH EYES: Primary | ICD-10-CM

## 2025-04-17 DIAGNOSIS — H52.203 HYPEROPIA WITH ASTIGMATISM AND PRESBYOPIA, BILATERAL: ICD-10-CM

## 2025-04-17 DIAGNOSIS — H52.4 HYPEROPIA WITH ASTIGMATISM AND PRESBYOPIA, BILATERAL: ICD-10-CM

## 2025-04-17 PROCEDURE — 99999 PR PBB SHADOW E&M-EST. PATIENT-LVL II: CPT | Mod: PBBFAC,,, | Performed by: OPTOMETRIST

## 2025-04-17 PROCEDURE — 99212 OFFICE O/P EST SF 10 MIN: CPT | Mod: PBBFAC,PO | Performed by: OPTOMETRIST

## 2025-04-17 NOTE — PROGRESS NOTES
JC    SHERLEY: 01/24  Chief complaint (CC): Patient is here for annual eye exam today.  Patient   hasn't noticed any vision changes since the last exam.  Glasses still seem   fine.  Glasses? +  Contacts? -  H/o eye surgery, injections or laser: -  H/o eye injury: -  Known eye conditions? See above  Family h/o eye conditions? Mother with AMD  Eye gtts? Refresh Relieva once a day      (-) Flashes (-)  Floaters (-) Mucous   (-)  Tearing (-) Itching (-) Burning   (-) Headaches (-) Eye Pain/discomfort (-) Irritation   (-)  Redness (-) Double vision (-) Blurry vision    Diabetic? -  A1c? -      Last edited by Phillip Dove, OD on 4/17/2025  2:17 PM.            Assessment /Plan     For exam results, see Encounter Report.      Allergic conjunctivitis of both eyes  Recommend Zaditor or Alaway bid OU and cool compresses to help soothe itching. Patient advised to RTC if condition gets worse.     Dry eye syndrome of both eyes  Recommend iVizia, Systane Ultra or Refresh Optive BID-TID OU to aid with symptoms of dry eyes.    Hyperopia with astigmatism and presbyopia, bilateral  SRx released to patient. Patient educated on lens options. Normal ocular health. RTC 1 year for routine exam.

## 2025-06-18 ENCOUNTER — TELEPHONE (OUTPATIENT)
Dept: INTERNAL MEDICINE | Facility: CLINIC | Age: 70
End: 2025-06-18
Payer: MEDICARE

## 2025-06-19 ENCOUNTER — OFFICE VISIT (OUTPATIENT)
Dept: INTERNAL MEDICINE | Facility: CLINIC | Age: 70
End: 2025-06-19
Payer: MEDICARE

## 2025-06-19 VITALS
HEIGHT: 65 IN | BODY MASS INDEX: 26.43 KG/M2 | WEIGHT: 158.63 LBS | SYSTOLIC BLOOD PRESSURE: 126 MMHG | RESPIRATION RATE: 18 BRPM | OXYGEN SATURATION: 96 % | HEART RATE: 80 BPM | TEMPERATURE: 99 F | DIASTOLIC BLOOD PRESSURE: 68 MMHG

## 2025-06-19 DIAGNOSIS — I70.0 AORTIC ATHEROSCLEROSIS: ICD-10-CM

## 2025-06-19 DIAGNOSIS — E55.9 VITAMIN D DEFICIENCY: ICD-10-CM

## 2025-06-19 DIAGNOSIS — E78.2 MIXED HYPERLIPIDEMIA: ICD-10-CM

## 2025-06-19 DIAGNOSIS — F17.210 LIGHT CIGARETTE SMOKER (1-9 CIGS/DAY): ICD-10-CM

## 2025-06-19 DIAGNOSIS — I10 ESSENTIAL HYPERTENSION: ICD-10-CM

## 2025-06-19 DIAGNOSIS — M81.0 AGE-RELATED OSTEOPOROSIS WITHOUT CURRENT PATHOLOGICAL FRACTURE: ICD-10-CM

## 2025-06-19 DIAGNOSIS — G25.0 BENIGN ESSENTIAL TREMOR: ICD-10-CM

## 2025-06-19 DIAGNOSIS — I25.10 CORONARY ARTERY DISEASE INVOLVING NATIVE CORONARY ARTERY OF NATIVE HEART WITHOUT ANGINA PECTORIS: ICD-10-CM

## 2025-06-19 DIAGNOSIS — Z80.3 FAMILY HISTORY OF BREAST CANCER: ICD-10-CM

## 2025-06-19 DIAGNOSIS — Z00.00 ENCOUNTER FOR MEDICARE ANNUAL WELLNESS EXAM: Primary | ICD-10-CM

## 2025-06-19 PROCEDURE — G0439 PPPS, SUBSEQ VISIT: HCPCS | Mod: ,,, | Performed by: PHYSICIAN ASSISTANT

## 2025-06-19 PROCEDURE — 99215 OFFICE O/P EST HI 40 MIN: CPT | Mod: PBBFAC | Performed by: PHYSICIAN ASSISTANT

## 2025-06-19 PROCEDURE — 99999 PR PBB SHADOW E&M-EST. PATIENT-LVL V: CPT | Mod: PBBFAC,,, | Performed by: PHYSICIAN ASSISTANT

## 2025-06-19 NOTE — PROGRESS NOTES
"  Kristy Wallisch presented for a  Medicare AWV and comprehensive Health Risk Assessment today. The following components were reviewed and updated:    Medical history  Family History  Social history  Allergies and Current Medications  Health Risk Assessment  Health Maintenance  Care Team         ** See Completed Assessments for Annual Wellness Visit within the encounter summary.**         The following assessments were completed:  Living Situation  CAGE  Depression Screening  Timed Get Up and Go  Whisper Test  Cognitive Function Screening  Nutrition Screening  ADL Screening  PAQ Screening      Opioid documentation:      Patient does not have a current opioid prescription.        Vitals:    06/19/25 1016   BP: 126/68   Pulse: 80   Resp: 18   Temp: 98.8 °F (37.1 °C)   TempSrc: Oral   SpO2: 96%   Weight: 72 kg (158 lb 9.9 oz)   Height: 5' 5" (1.651 m)     Body mass index is 26.4 kg/m².  Physical Exam  Vitals and nursing note reviewed.   Constitutional:       Appearance: Normal appearance. She is well-developed.   HENT:      Head: Normocephalic.      Right Ear: External ear normal.      Left Ear: External ear normal.   Eyes:      Pupils: Pupils are equal, round, and reactive to light.   Cardiovascular:      Rate and Rhythm: Normal rate and regular rhythm.      Heart sounds: Normal heart sounds. No murmur heard.     No friction rub. No gallop.   Pulmonary:      Effort: Pulmonary effort is normal. No respiratory distress.      Breath sounds: Normal breath sounds.   Abdominal:      Palpations: Abdomen is soft.      Tenderness: There is no abdominal tenderness.   Skin:     General: Skin is warm and dry.   Neurological:      Mental Status: She is alert.               Diagnoses and health risks identified today and associated recommendations/orders:    1. Encounter for Medicare annual wellness exam  Assessments completed.  Preventative health recommendations reviewed.     - Referral to Enhanced Annual Wellness Visit (eAWV) " M+1    2. Benign essential tremor  Stable, controlled on current medical therapy, followed by PCP.    3. Essential hypertension  Stable, controlled on current medical therapy, followed by PCP.  BP is controlled.  Tx with amlodipine, hctz.    4. Mixed hyperlipidemia  Stable, controlled on current medical therapy, followed by PCP.  Lab work reviewed.  Tx with zetia and lipitor.    5. Aortic atherosclerosis  Modifying risk factors.    6. Coronary artery disease involving native coronary artery of native heart without angina pectoris  Follows with cardiology.  Takign statin and aspirin.    7. Family history of breast cancer  Mammogram is up to date.    8. Vitamin D deficiency  Stable, controlled on current medical therapy, followed by PCP.  Continue supplement.    9. Age-related osteoporosis without current pathological fracture  DEXA is up to date.  Tx with fosamax.    10. Light cigarette smoker (1-9 cigs/day)  Cessation encouraged.      Provided Shanae with a 5-10 year written screening schedule and personal prevention plan. Recommendations were developed using the USPSTF age appropriate recommendations. Education, counseling, and referrals were provided as needed. After Visit Summary printed and given to patient which includes a list of additional screenings\tests needed.    Follow up if symptoms worsen or fail to improve.    Marlene Craig PA-C  I offered to discuss advanced care planning, including how to pick a person who would make decisions for you if you were unable to make them for yourself, called a health care power of , and what kind of decisions you might make such as use of life sustaining treatments such as ventilators and tube feeding when faced with a life limiting illness recorded on a living will that they will need to know. (How you want to be cared for as you near the end of your natural life)     X Patient is interested in learning more about how to make advanced directives.  I provided  them paperwork and offered to discuss this with them.

## 2025-06-19 NOTE — PATIENT INSTRUCTIONS
Counseling and Referral of Other Preventative  (Italic type indicates deductible and co-insurance are waived)    Patient Name: Kristy Wallisch  Today's Date: 6/19/2025    Health Maintenance       Date Due Completion Date    Pneumococcal Vaccines (Age 50+) (3 of 3 - PCV20 or PCV21) 08/03/2025 8/3/2020    LDCT Lung Screen 08/13/2025 8/13/2024    High Dose Statin 10/07/2025 10/7/2024    TETANUS VACCINE 01/01/2026 1/1/2016 (Done)    Override on 1/1/2016: Done    Mammogram 01/06/2026 1/6/2025    DEXA Scan 08/04/2026 8/4/2023    Hemoglobin A1c (Diabetic Prevention Screening) 12/27/2026 12/27/2023    Lipid Panel 12/27/2028 12/27/2023    Colorectal Cancer Screening 09/11/2030 9/11/2023        No orders of the defined types were placed in this encounter.    The following information is provided to all patients.  This information is to help you find resources for any of the problems found today that may be affecting your health:                  Living healthy guide: www.[facility].[place].gov      Understanding Diabetes: www.diabetes.org      Eating healthy: www.cdc.gov/healthyweight      CDC home safety checklist: www.cdc.gov/steadi/patient.html      Agency on Aging: www.goea.[place].[facility]      Alcoholics anonymous (AA): www.aa.org      Physical Activity: www.anselmo.nih.gov/ak6jbgg      Tobacco use: www.quitwithusla.org

## 2025-07-20 DIAGNOSIS — M81.0 AGE-RELATED OSTEOPOROSIS WITHOUT CURRENT PATHOLOGICAL FRACTURE: ICD-10-CM

## 2025-07-20 NOTE — TELEPHONE ENCOUNTER
Care Due:                  Date            Visit Type   Department     Provider  --------------------------------------------------------------------------------                                MYCHART                              ANNUAL                              CHECKUP/PHY  [facility] INTERNAL  Last Visit: 08-      S            ARSEN Carbajal                              EP -                              PRIMARY      [facility] INTERNAL  Next Visit: 08-      CARE ([facility])   MEDICINE       Leslie Carbajal                                                            Last  Test          Frequency    Reason                     Performed    Due Date  --------------------------------------------------------------------------------    CMP.........  12 months..  alendronate,               12- 12-                             hydroCHLOROthiazide......    Mg Level....  12 months..  alendronate..............  Not Found    Overdue    Phosphate...  12 months..  alendronate..............  Not Found    Overdue    [employer] Embedded Care Due Messages. Reference number: [id number].   7/20/2025 12:02:39 PM CDT

## 2025-07-21 RX ORDER — ALENDRONATE SODIUM 70 MG/1
70 TABLET ORAL
Qty: 12 TABLET | Refills: 3 | Status: SHIPPED | OUTPATIENT
Start: 2025-07-21

## 2025-08-07 ENCOUNTER — OFFICE VISIT (OUTPATIENT)
Dept: INTERNAL MEDICINE | Facility: CLINIC | Age: 70
End: 2025-08-07
Payer: MEDICARE

## 2025-08-07 ENCOUNTER — APPOINTMENT (OUTPATIENT)
Dept: LAB | Facility: OTHER | Age: 70
End: 2025-08-07
Attending: STUDENT IN AN ORGANIZED HEALTH CARE EDUCATION/TRAINING PROGRAM
Payer: MEDICARE

## 2025-08-07 VITALS
SYSTOLIC BLOOD PRESSURE: 128 MMHG | DIASTOLIC BLOOD PRESSURE: 70 MMHG | HEART RATE: 75 BPM | BODY MASS INDEX: 26.33 KG/M2 | WEIGHT: 158.06 LBS | HEIGHT: 65 IN | OXYGEN SATURATION: 98 %

## 2025-08-07 DIAGNOSIS — E55.9 VITAMIN D DEFICIENCY: ICD-10-CM

## 2025-08-07 DIAGNOSIS — I10 ESSENTIAL HYPERTENSION: ICD-10-CM

## 2025-08-07 DIAGNOSIS — Z00.00 HEALTH MAINTENANCE EXAMINATION: Primary | ICD-10-CM

## 2025-08-07 DIAGNOSIS — R73.09 OTHER ABNORMAL GLUCOSE: ICD-10-CM

## 2025-08-07 DIAGNOSIS — Z23 NEED FOR VACCINATION: ICD-10-CM

## 2025-08-07 DIAGNOSIS — Z12.31 SCREENING MAMMOGRAM FOR BREAST CANCER: ICD-10-CM

## 2025-08-07 DIAGNOSIS — Z12.83 SKIN CANCER SCREENING: ICD-10-CM

## 2025-08-07 DIAGNOSIS — E78.2 MIXED HYPERLIPIDEMIA: ICD-10-CM

## 2025-08-07 DIAGNOSIS — M81.0 AGE-RELATED OSTEOPOROSIS WITHOUT CURRENT PATHOLOGICAL FRACTURE: ICD-10-CM

## 2025-08-07 DIAGNOSIS — Z01.419 WELL WOMAN EXAM: ICD-10-CM

## 2025-08-07 DIAGNOSIS — F17.210 NICOTINE DEPENDENCE, CIGARETTES, UNCOMPLICATED: ICD-10-CM

## 2025-08-07 DIAGNOSIS — I25.10 CORONARY ARTERY DISEASE INVOLVING NATIVE CORONARY ARTERY OF NATIVE HEART WITHOUT ANGINA PECTORIS: ICD-10-CM

## 2025-08-07 PROCEDURE — 99999 PR PBB SHADOW E&M-EST. PATIENT-LVL V: CPT | Mod: PBBFAC,,, | Performed by: STUDENT IN AN ORGANIZED HEALTH CARE EDUCATION/TRAINING PROGRAM

## 2025-08-07 PROCEDURE — G0009 ADMIN PNEUMOCOCCAL VACCINE: HCPCS | Mod: PBBFAC

## 2025-08-07 PROCEDURE — 90677 PCV20 VACCINE IM: CPT | Mod: PBBFAC

## 2025-08-07 PROCEDURE — 99215 OFFICE O/P EST HI 40 MIN: CPT | Mod: PBBFAC | Performed by: STUDENT IN AN ORGANIZED HEALTH CARE EDUCATION/TRAINING PROGRAM

## 2025-08-07 PROCEDURE — 99999PBSHW PR PBB SHADOW TECHNICAL ONLY FILED TO HB: Mod: PBBFAC,,,

## 2025-08-07 RX ADMIN — PNEUMOCOCCAL 20-VALENT CONJUGATE VACCINE 0.5 ML
2.2; 2.2; 2.2; 2.2; 2.2; 2.2; 2.2; 2.2; 2.2; 2.2; 2.2; 2.2; 2.2; 2.2; 2.2; 2.2; 4.4; 2.2; 2.2; 2.2 INJECTION, SUSPENSION INTRAMUSCULAR at 01:08

## 2025-08-07 NOTE — PROGRESS NOTES
Subjective:       Patient ID: Kristy M Wallisch is a 69 y.o. female.    Chief Complaint: Health maintenance examination [Z00.00]    Patient is established with me, here today for the following:     HTN, HLD, osteoporosis, vitamin D deficiency, essential tremor, mild aortic stenosis     History of Present Illness      BLOOD PRESSURE:  She performs weekly home blood pressure monitoring with readings predominantly under 130 mmHg, with only one instance above 130 mmHg. She denies any problems with blood pressure medications and reports stable blood pressure management.    TREMOR:  She reports occasional head bobbing/hand tremor occurring primarily during periods of stress. She has a family history of tremor, with her mother and grandmother experiencing similar symptoms. The tremor is intermittent and manageable, typically able to resolve on its own. She denies vocal tremor or significant hand tremor at rest. She reports condition was previously referred to as benign essential tremor in her family.    GERD:  She reports occasional heartburn symptoms and has identified tamales as a potential trigger. She denies associated chest pain or trouble breathing. She has started tracking her diet to better understand the cause of her symptoms.    SOCIAL HISTORY:  She currently smokes half a pack of cigarettes daily. She acknowledges potential ability to quit smoking if motivated but is not actively pursuing cessation.    PHYSICAL ACTIVITY AND MSK:  She maintains good physical activity level without significant limitations. She reports occasional mild, self-limiting musculoskeletal discomfort with heavy lifting that typically resolves within a few days after overexertion. She denies any current significant musculoskeletal pain or limitations.    VISION:  She had eye exams in January and February showing no significant changes in vision. She denies any current vision concerns or alterations.      ROS:  Eyes: -vision  "changes  Cardiovascular: -chest pain, -palpitations  Gastrointestinal: +heartburn  Musculoskeletal: +pain with movement  Neurological: -headache, -dizziness, +tremors, +involuntary movements        Health maintenance -   Colonoscopy performed SEP2023. Told to repeat in 7 years.  Denies family history of colorectal cancer.  Mammogram BI-RADS 1 in JAN2025.  Denies history of abnormal mammogram.  Family history of breast cancer.  Denies family history of ovarian cancer.  Completed pap screening.  Denies history of abnormal pap smear.  Family history of cardiac disease.  UTD on Tdap, COVID, PPSV23, PCV13, shingles vaccinations.  Due for PCV20 vaccinations.  Started smoking at age 24, at most 1 PPD. Currently smoking <0.5 PPD.  Denies current drug use.  Low dose lung CT scan was LUNG-RADS 2 in AUG2024.  Completed HIV and hepatitis C screening.  Lab Results   Component Value Date    HGBA1C 5.4 12/27/2023      HTN -   Currently prescribed amlodipine, HCTZ.  Lab Results   Component Value Date    MICALBCREAT 7.9 12/27/2023     BP Readings from Last 5 Encounters:   06/19/25 126/68   10/07/24 130/78   08/30/24 133/74   08/13/24 131/71   08/01/24 128/70      HLD -   Endorses taking atorvastatin and Zetia as directed  Lab Results   Component Value Date    CHOL 144 12/27/2023     Lab Results   Component Value Date    TRIG 58 12/27/2023     Lab Results   Component Value Date    LDLCALC 76.4 12/27/2023     Lab Results   Component Value Date    HDL 56 12/27/2023      Osteoporosis -   Vitamin D deficiency -   Completed DEXA in AUG2023.  Showed osteopenia.  "Ten year fracture probability:  Major osteoporotic 13.1%, hip 4%"  Denies family history of osteoporosis.  Currently taking alendronate. Started medication in SEP2023.  Currently taking vitamin D3 daily.  Currently taking calcium daily.  Lab Results   Component Value Date    HYLYGQVP56SG 46 12/27/2023    [id number] 38 11/18/2022    [id number] 48 11/01/2021     Mild aortic " "stenosis -   Following with Dr. Noe for cardiology.  Echo AUG2024 EF 50-55%, Grade I DD, moderate aortic valve sclerosis, mild aortic stenosis   Carotid ultrasound AUG2024 "There is 20-39% right Internal Carotid Stenosis. There is 0-19% left Internal Carotid Stenosis."          Current Outpatient Medications   Medication Instructions    alendronate (FOSAMAX) 70 mg, Oral, Every 7 days    amLODIPine (NORVASC) 10 mg, Oral, Daily    ascorbic acid, vitamin C, (VITAMIN C) 500 MG tablet Take by mouth. 1 Tablet Oral Every day    aspirin 81 MG Chew Take by mouth. 1 Tablet, Chewable Oral Every day    atorvastatin (LIPITOR) 80 mg, Oral, Daily    calcium citrate-vitamin D3 315-200 mg (CITRACAL+D) 315-200 mg-unit per tablet 1 tablet, 2 times daily    ezetimibe (ZETIA) 10 mg, Oral, Daily    GLUCOSAM HCL/MSM/CHONDRO MONTE A (GLUCOSAMINE HCL-MSM-CHONDROITN) 375-250-300 mg Tab Take by mouth. 1 Tablet Oral Every day    hydroCHLOROthiazide 12.5 mg, Oral, Daily    multivitamin (THERAGRAN) per tablet Take by mouth. 1 Tablet Oral Every day     Objective:      Vitals:    08/07/25 1312   BP: 128/70   Pulse: 75   SpO2: 98%   Weight: 71.7 kg (158 lb 1.1 oz)   Height: 5' 5" (1.651 m)   PainSc: 0-No pain     Body mass index is 26.3 kg/m².    Physical Exam  Vitals reviewed.   Constitutional:       General: She is not in acute distress.     Appearance: Normal appearance. She is not ill-appearing or diaphoretic.   HENT:      Head: Normocephalic and atraumatic.      Right Ear: Tympanic membrane, ear canal and external ear normal. There is no impacted cerumen.      Left Ear: Tympanic membrane, ear canal and external ear normal. There is no impacted cerumen.      Nose: Nose normal. No rhinorrhea.      Mouth/Throat:      Mouth: Mucous membranes are moist.      Pharynx: Oropharynx is clear. No oropharyngeal exudate or posterior oropharyngeal erythema.   Eyes:      General: No scleral icterus.        Right eye: No discharge.         Left eye: No " discharge.      Conjunctiva/sclera: Conjunctivae normal.   Neck:      Thyroid: No thyromegaly or thyroid tenderness.      Vascular: No carotid bruit.      Trachea: Trachea normal.   Cardiovascular:      Rate and Rhythm: Normal rate. Rhythm irregular.      Heart sounds: Murmur heard.      No friction rub. No gallop.   Pulmonary:      Effort: Pulmonary effort is normal. No respiratory distress.      Breath sounds: Normal breath sounds. No stridor. No wheezing, rhonchi or rales.   Abdominal:      General: There is no distension.      Palpations: Abdomen is soft.      Tenderness: There is no abdominal tenderness. There is no guarding or rebound.   Musculoskeletal:         General: No swelling or deformity.      Cervical back: Neck supple.   Lymphadenopathy:      Head:      Right side of head: No submandibular or posterior auricular adenopathy.      Left side of head: No submandibular or posterior auricular adenopathy.      Cervical: No cervical adenopathy.      Right cervical: No superficial, deep or posterior cervical adenopathy.     Left cervical: No superficial, deep or posterior cervical adenopathy.      Upper Body:      Right upper body: No supraclavicular adenopathy.      Left upper body: No supraclavicular adenopathy.   Skin:     General: Skin is warm and dry.   Neurological:      General: No focal deficit present.      Mental Status: She is alert. Mental status is at baseline.      Motor: Tremor (head) present.      Coordination: Finger-Nose-Finger Test normal.      Gait: Gait normal.   Psychiatric:         Mood and Affect: Mood normal.         Behavior: Behavior normal.         Assessment:       1. Health maintenance examination    2. Vitamin D deficiency    3. Essential hypertension    4. Other abnormal glucose    5. Mixed hyperlipidemia    6. Nicotine dependence, cigarettes, uncomplicated    7. Screening mammogram for breast cancer    8. Age-related osteoporosis without current pathological fracture    9.  Need for vaccination    10. Coronary artery disease involving native coronary artery of native heart without angina pectoris    11. Well woman exam    12. Skin cancer screening        Plan:   Health maintenance examination  -     Vitamin D; Future; Expected date: 08/07/2025  -     CBC Auto Differential; Future; Expected date: 08/07/2025  -     Hemoglobin A1C; Future; Expected date: 08/07/2025  -     Lipid Panel; Future; Expected date: 08/07/2025  -     TSH; Future; Expected date: 08/07/2025  -     Comprehensive Metabolic Panel; Future; Expected date: 08/07/2025  -     Microalbumin/Creatinine Ratio, Urine; Future; Expected date: 08/07/2025    Vitamin D deficiency  -     Vitamin D; Future; Expected date: 08/07/2025    Essential hypertension  -     CBC Auto Differential; Future; Expected date: 08/07/2025  -     TSH; Future; Expected date: 08/07/2025  -     Comprehensive Metabolic Panel; Future; Expected date: 08/07/2025  -     Microalbumin/Creatinine Ratio, Urine; Future; Expected date: 08/07/2025    Other abnormal glucose  -     Hemoglobin A1C; Future; Expected date: 08/07/2025    Mixed hyperlipidemia  -     Lipid Panel; Future; Expected date: 08/07/2025    Nicotine dependence, cigarettes, uncomplicated  -     CT Chest Lung Screening Low Dose; Future; Expected date: 12/07/2025    Screening mammogram for breast cancer  -     Mammo Digital Screening Bilat w/ Vijay; Future; Expected date: 01/06/2026    Age-related osteoporosis without current pathological fracture  -     DXA Bone Density Axial Skeleton 1 or more sites; Future; Expected date: 08/07/2025    Need for vaccination  -     pneumoc 20-melissa conj-dip cr(PF) (PREVNAR-20 (PF)) injection Syrg 0.5 mL    Coronary artery disease involving native coronary artery of native heart without angina pectoris  -     Ambulatory referral/consult to Cardiology; Future; Expected date: 08/14/2025    Well woman exam  -     Ambulatory referral/consult to Obstetrics / Gynecology; Future;  Expected date: 10/07/2025    Skin cancer screening  -     Ambulatory referral/consult to Dermatology; Future; Expected date: 08/14/2025        Assessment & Plan      PLAN SUMMARY:  - Order EKG to evaluate heart rhythm  - Order DEXA scan to assess bone density  - Order lipid panel  - Order comprehensive lab work (A1C, blood counts, vitamin D, thyroid, metabolic panel)  - Order low-dose CT chest for lung cancer screening  - Administer pneumonia vaccine  - Continue Zetia and atorvastatin for cholesterol management  - Continue once-weekly alendronate with vitamin D and calcium supplements  - Refer to Dr. Noe for cardiology follow-up  - Refer to dermatology for skin check  - Refer to OB/GYN for follow-up in October  - Schedule mammogram in January  - Advised to avoid tamales to manage symptoms  - Discussed smoking cessation options (nicotine gum, patches, medications)  - Consider neurology consultation if tremor symptoms worsen  - Consider discontinuation of alendronate if improvement in bone density is seen  - Return for follow-up visit in 6 months (February)    ESSENTIAL (PRIMARY) HYPERTENSION:  - Blood pressure is well-controlled with current medications, with only one reading over 130 at home.  - Shanae to continue weekly home monitoring.    IRREGULAR HEART RHYTHM:  - Detected slight heart rhythm irregularity during exam, likely a PAC/PVC.  - Explained these are occasional irregular heartbeats that can be triggered by sleep deprivation, dehydration, smoking, or caffeine.  - Ordered EKG to evaluate heart rhythm and referred patient to Dr. Noe for cardiology follow-up.    CARDIAC MURMUR:  - Persistent heart murmur noted, consistent with previous echocardiogram findings from last year.  - Recommend periodic echocardiogram for ongoing monitoring.    HYPERLIPIDEMIA:  - Continue Zetia and atorvastatin for cholesterol management.  - Ordered lipid panel to reassess levels.    GASTROESOPHAGEAL REFLUX DISEASE:  - Shanae  experiences occasional heartburn.    AGE-RELATED OSTEOPOROSIS:  - Ordered DEXA scan to assess bone density and evaluate effectiveness of alendronate therapy.  - Will consider discontinuation of alendronate if improvement is seen.  - Continue once-weekly alendronate along with vitamin D and calcium supplements.    NICOTINE DEPENDENCE:  - Shanae smokes approximately 0.5 pack of cigarettes daily.  - Ordered low-dose CT chest for lung cancer screening and administered pneumonia vaccine in office.  - Discussed various smoking cessation options including nicotine gum, patches, and medications.    GENERAL HEALTH MAINTENANCE:  - Schedule mammogram in January.  - Ordered comprehensive lab work including A1C, blood counts, vitamin D, thyroid, metabolic panel, and urine test.  - Referred to dermatology for skin check and OB/GYN for follow-up in October.    FOLLOW-UP:  - Return for follow-up visit in 6 months (February).         Leslie Carbajal MD      8/7/2025

## 2025-08-07 NOTE — PROGRESS NOTES
After obtaining consent, and per orders of Dr. Carbajal, injection of PCV20 Lot [id number] 08/2026 given in the LD by Anuj. Patient tolerated well and band aid applied. Patient instructed to remain in clinic for 15 minutes afterwards, and to report any adverse reaction to me immediately.

## 2025-08-25 DIAGNOSIS — E78.2 MIXED HYPERLIPIDEMIA: ICD-10-CM

## 2025-08-25 RX ORDER — EZETIMIBE 10 MG/1
10 TABLET ORAL DAILY
Qty: 90 TABLET | Refills: 3 | Status: SHIPPED | OUTPATIENT
Start: 2025-08-25

## 2025-08-28 ENCOUNTER — HOSPITAL ENCOUNTER (OUTPATIENT)
Dept: RADIOLOGY | Facility: OTHER | Age: 70
Discharge: HOME | End: 2025-08-28
Attending: STUDENT IN AN ORGANIZED HEALTH CARE EDUCATION/TRAINING PROGRAM
Payer: MEDICARE

## 2025-08-28 DIAGNOSIS — M81.0 AGE-RELATED OSTEOPOROSIS WITHOUT CURRENT PATHOLOGICAL FRACTURE: ICD-10-CM

## 2025-08-28 PROCEDURE — 77080 DXA BONE DENSITY AXIAL: CPT | Mod: 26,,, | Performed by: RADIOLOGY

## 2025-08-28 PROCEDURE — 77080 DXA BONE DENSITY AXIAL: CPT | Mod: TC
